# Patient Record
Sex: MALE | Race: BLACK OR AFRICAN AMERICAN | NOT HISPANIC OR LATINO | Employment: UNEMPLOYED | URBAN - METROPOLITAN AREA
[De-identification: names, ages, dates, MRNs, and addresses within clinical notes are randomized per-mention and may not be internally consistent; named-entity substitution may affect disease eponyms.]

---

## 2016-12-29 LAB — CRC RECOMMENDATION EXT: NORMAL

## 2017-03-29 ENCOUNTER — HISTORICAL (OUTPATIENT)
Dept: ENDOSCOPY | Facility: HOSPITAL | Age: 52
End: 2017-03-29

## 2020-08-31 ENCOUNTER — HISTORICAL (OUTPATIENT)
Dept: ADMINISTRATIVE | Facility: HOSPITAL | Age: 55
End: 2020-08-31

## 2020-08-31 LAB
BUN SERPL-MCNC: 14 MG/DL (ref 7–18)
CALCIUM SERPL-MCNC: 9.5 MG/DL (ref 8.5–10.1)
CHLORIDE SERPL-SCNC: 108 MMOL/L (ref 98–107)
CO2 SERPL-SCNC: 26 MMOL/L (ref 21–32)
CREAT SERPL-MCNC: 1.2 MG/DL (ref 0.6–1.3)
CREAT/UREA NIT SERPL: 12 MG/DL (ref 12–14)
GLUCOSE SERPL-MCNC: 90 MG/DL (ref 74–106)
POTASSIUM SERPL-SCNC: 4.7 MMOL/L (ref 3.5–5.1)
PSA SERPL-MCNC: 117 NG/ML
SODIUM SERPL-SCNC: 140 MMOL/L (ref 136–145)

## 2020-09-10 ENCOUNTER — HISTORICAL (OUTPATIENT)
Dept: RADIOLOGY | Facility: HOSPITAL | Age: 55
End: 2020-09-10

## 2020-09-14 ENCOUNTER — HISTORICAL (OUTPATIENT)
Dept: RADIATION THERAPY | Facility: HOSPITAL | Age: 55
End: 2020-09-14

## 2020-09-29 ENCOUNTER — HISTORICAL (OUTPATIENT)
Dept: ENDOSCOPY | Facility: HOSPITAL | Age: 55
End: 2020-09-29

## 2020-09-30 ENCOUNTER — HISTORICAL (OUTPATIENT)
Dept: RADIATION THERAPY | Facility: HOSPITAL | Age: 55
End: 2020-09-30

## 2020-10-14 ENCOUNTER — HISTORICAL (OUTPATIENT)
Dept: RADIATION THERAPY | Facility: HOSPITAL | Age: 55
End: 2020-10-14

## 2020-10-15 ENCOUNTER — HISTORICAL (OUTPATIENT)
Dept: RADIATION THERAPY | Facility: HOSPITAL | Age: 55
End: 2020-10-15

## 2020-10-19 ENCOUNTER — HISTORICAL (OUTPATIENT)
Dept: RADIATION THERAPY | Facility: HOSPITAL | Age: 55
End: 2020-10-19

## 2020-10-20 ENCOUNTER — HISTORICAL (OUTPATIENT)
Dept: RADIATION THERAPY | Facility: HOSPITAL | Age: 55
End: 2020-10-20

## 2020-10-21 ENCOUNTER — HISTORICAL (OUTPATIENT)
Dept: RADIATION THERAPY | Facility: HOSPITAL | Age: 55
End: 2020-10-21

## 2020-10-22 ENCOUNTER — HISTORICAL (OUTPATIENT)
Dept: RADIATION THERAPY | Facility: HOSPITAL | Age: 55
End: 2020-10-22

## 2020-10-23 ENCOUNTER — HISTORICAL (OUTPATIENT)
Dept: RADIATION THERAPY | Facility: HOSPITAL | Age: 55
End: 2020-10-23

## 2020-10-26 ENCOUNTER — HISTORICAL (OUTPATIENT)
Dept: RADIATION THERAPY | Facility: HOSPITAL | Age: 55
End: 2020-10-26

## 2020-10-27 ENCOUNTER — HISTORICAL (OUTPATIENT)
Dept: RADIATION THERAPY | Facility: HOSPITAL | Age: 55
End: 2020-10-27

## 2020-10-28 ENCOUNTER — HISTORICAL (OUTPATIENT)
Dept: RADIATION THERAPY | Facility: HOSPITAL | Age: 55
End: 2020-10-28

## 2020-10-29 ENCOUNTER — HISTORICAL (OUTPATIENT)
Dept: RADIATION THERAPY | Facility: HOSPITAL | Age: 55
End: 2020-10-29

## 2020-10-30 ENCOUNTER — HISTORICAL (OUTPATIENT)
Dept: RADIATION THERAPY | Facility: HOSPITAL | Age: 55
End: 2020-10-30

## 2020-11-02 ENCOUNTER — HISTORICAL (OUTPATIENT)
Dept: RADIATION THERAPY | Facility: HOSPITAL | Age: 55
End: 2020-11-02

## 2020-11-03 ENCOUNTER — HISTORICAL (OUTPATIENT)
Dept: RADIATION THERAPY | Facility: HOSPITAL | Age: 55
End: 2020-11-03

## 2020-11-04 ENCOUNTER — HISTORICAL (OUTPATIENT)
Dept: RADIATION THERAPY | Facility: HOSPITAL | Age: 55
End: 2020-11-04

## 2020-11-05 ENCOUNTER — HISTORICAL (OUTPATIENT)
Dept: RADIATION THERAPY | Facility: HOSPITAL | Age: 55
End: 2020-11-05

## 2020-11-06 ENCOUNTER — HISTORICAL (OUTPATIENT)
Dept: RADIATION THERAPY | Facility: HOSPITAL | Age: 55
End: 2020-11-06

## 2020-11-09 ENCOUNTER — HISTORICAL (OUTPATIENT)
Dept: RADIATION THERAPY | Facility: HOSPITAL | Age: 55
End: 2020-11-09

## 2020-11-10 ENCOUNTER — HISTORICAL (OUTPATIENT)
Dept: RADIATION THERAPY | Facility: HOSPITAL | Age: 55
End: 2020-11-10

## 2020-11-11 ENCOUNTER — HISTORICAL (OUTPATIENT)
Dept: RADIATION THERAPY | Facility: HOSPITAL | Age: 55
End: 2020-11-11

## 2020-11-12 ENCOUNTER — HISTORICAL (OUTPATIENT)
Dept: RADIATION THERAPY | Facility: HOSPITAL | Age: 55
End: 2020-11-12

## 2020-11-13 ENCOUNTER — HISTORICAL (OUTPATIENT)
Dept: RADIATION THERAPY | Facility: HOSPITAL | Age: 55
End: 2020-11-13

## 2020-11-16 ENCOUNTER — HISTORICAL (OUTPATIENT)
Dept: RADIATION THERAPY | Facility: HOSPITAL | Age: 55
End: 2020-11-16

## 2020-11-17 ENCOUNTER — HISTORICAL (OUTPATIENT)
Dept: RADIATION THERAPY | Facility: HOSPITAL | Age: 55
End: 2020-11-17

## 2020-11-18 ENCOUNTER — HISTORICAL (OUTPATIENT)
Dept: RADIATION THERAPY | Facility: HOSPITAL | Age: 55
End: 2020-11-18

## 2020-11-19 ENCOUNTER — HISTORICAL (OUTPATIENT)
Dept: RADIATION THERAPY | Facility: HOSPITAL | Age: 55
End: 2020-11-19

## 2020-11-20 ENCOUNTER — HISTORICAL (OUTPATIENT)
Dept: RADIATION THERAPY | Facility: HOSPITAL | Age: 55
End: 2020-11-20

## 2020-11-23 ENCOUNTER — HISTORICAL (OUTPATIENT)
Dept: RADIATION THERAPY | Facility: HOSPITAL | Age: 55
End: 2020-11-23

## 2020-11-24 ENCOUNTER — HISTORICAL (OUTPATIENT)
Dept: RADIATION THERAPY | Facility: HOSPITAL | Age: 55
End: 2020-11-24

## 2020-11-25 ENCOUNTER — HISTORICAL (OUTPATIENT)
Dept: RADIATION THERAPY | Facility: HOSPITAL | Age: 55
End: 2020-11-25

## 2020-11-30 ENCOUNTER — HISTORICAL (OUTPATIENT)
Dept: RADIATION THERAPY | Facility: HOSPITAL | Age: 55
End: 2020-11-30

## 2020-12-01 ENCOUNTER — HISTORICAL (OUTPATIENT)
Dept: RADIATION THERAPY | Facility: HOSPITAL | Age: 55
End: 2020-12-01

## 2021-01-07 ENCOUNTER — HISTORICAL (OUTPATIENT)
Dept: UROLOGY | Facility: CLINIC | Age: 56
End: 2021-01-07

## 2021-01-07 LAB — PSA SERPL-MCNC: 5.09 NG/ML

## 2021-03-31 ENCOUNTER — HISTORICAL (OUTPATIENT)
Dept: RADIATION THERAPY | Facility: HOSPITAL | Age: 56
End: 2021-03-31

## 2021-04-12 ENCOUNTER — HISTORICAL (OUTPATIENT)
Dept: WOUND CARE | Facility: HOSPITAL | Age: 56
End: 2021-04-12

## 2021-04-12 LAB — PSA SERPL-MCNC: 4.03 NG/ML

## 2021-09-01 ENCOUNTER — HISTORICAL (OUTPATIENT)
Dept: RADIOLOGY | Facility: HOSPITAL | Age: 56
End: 2021-09-01

## 2021-09-02 ENCOUNTER — HISTORICAL (OUTPATIENT)
Dept: ADMINISTRATIVE | Facility: HOSPITAL | Age: 56
End: 2021-09-02

## 2021-09-02 LAB
ABS NEUT (OLG): 4.78 X10(3)/MCL (ref 2.1–9.2)
ALBUMIN SERPL-MCNC: 3.7 GM/DL (ref 3.5–5)
ALBUMIN/GLOB SERPL: 0.9 RATIO (ref 1.1–2)
ALP SERPL-CCNC: 106 UNIT/L (ref 40–150)
ALT SERPL-CCNC: 22 UNIT/L (ref 0–55)
AST SERPL-CCNC: 18 UNIT/L (ref 5–34)
BASOPHILS # BLD AUTO: 0 X10(3)/MCL (ref 0–0.2)
BASOPHILS NFR BLD AUTO: 0 %
BILIRUB SERPL-MCNC: 0.8 MG/DL
BILIRUBIN DIRECT+TOT PNL SERPL-MCNC: 0.3 MG/DL (ref 0–0.5)
BILIRUBIN DIRECT+TOT PNL SERPL-MCNC: 0.5 MG/DL (ref 0–0.8)
BUN SERPL-MCNC: 11.9 MG/DL (ref 8.4–25.7)
CALCIUM SERPL-MCNC: 9.6 MG/DL (ref 8.4–10.2)
CHLORIDE SERPL-SCNC: 104 MMOL/L (ref 98–107)
CO2 SERPL-SCNC: 27 MMOL/L (ref 22–29)
CREAT SERPL-MCNC: 1.05 MG/DL (ref 0.73–1.18)
EOSINOPHIL # BLD AUTO: 0.2 X10(3)/MCL (ref 0–0.9)
EOSINOPHIL NFR BLD AUTO: 2 %
ERYTHROCYTE [DISTWIDTH] IN BLOOD BY AUTOMATED COUNT: 12.8 % (ref 11.5–14.5)
GLOBULIN SER-MCNC: 4.1 GM/DL (ref 2.4–3.5)
GLUCOSE SERPL-MCNC: 87 MG/DL (ref 74–100)
HCT VFR BLD AUTO: 35.7 % (ref 40–51)
HGB BLD-MCNC: 12 GM/DL (ref 13.5–17.5)
IMM GRANULOCYTES # BLD AUTO: 0.03 10*3/UL
IMM GRANULOCYTES NFR BLD AUTO: 0 %
LYMPHOCYTES # BLD AUTO: 1.9 X10(3)/MCL (ref 0.6–4.6)
LYMPHOCYTES NFR BLD AUTO: 26 %
MCH RBC QN AUTO: 28.2 PG (ref 26–34)
MCHC RBC AUTO-ENTMCNC: 33.6 GM/DL (ref 31–37)
MCV RBC AUTO: 83.8 FL (ref 80–100)
MONOCYTES # BLD AUTO: 0.5 X10(3)/MCL (ref 0.1–1.3)
MONOCYTES NFR BLD AUTO: 7 %
NEUTROPHILS # BLD AUTO: 4.78 X10(3)/MCL (ref 2.1–9.2)
NEUTROPHILS NFR BLD AUTO: 64 %
NRBC BLD AUTO-RTO: 0 % (ref 0–0.2)
PLATELET # BLD AUTO: 149 X10(3)/MCL (ref 130–400)
PMV BLD AUTO: 11.2 FL (ref 7.4–10.4)
POTASSIUM SERPL-SCNC: 4.2 MMOL/L (ref 3.5–5.1)
PROT SERPL-MCNC: 7.8 GM/DL (ref 6.4–8.3)
PSA SERPL-MCNC: 2.55 NG/ML
RBC # BLD AUTO: 4.26 X10(6)/MCL (ref 4.5–5.9)
SODIUM SERPL-SCNC: 138 MMOL/L (ref 136–145)
WBC # SPEC AUTO: 7.4 X10(3)/MCL (ref 4.5–11)

## 2021-09-29 ENCOUNTER — HISTORICAL (OUTPATIENT)
Dept: RADIATION THERAPY | Facility: HOSPITAL | Age: 56
End: 2021-09-29

## 2021-10-14 ENCOUNTER — HISTORICAL (OUTPATIENT)
Dept: ADMINISTRATIVE | Facility: HOSPITAL | Age: 56
End: 2021-10-14

## 2021-10-14 LAB
ABS NEUT (OLG): 3.72 X10(3)/MCL (ref 2.1–9.2)
ALBUMIN SERPL-MCNC: 3.3 GM/DL (ref 3.5–5)
ALBUMIN/GLOB SERPL: 0.9 RATIO (ref 1.1–2)
ALP SERPL-CCNC: 121 UNIT/L (ref 40–150)
ALT SERPL-CCNC: 32 UNIT/L (ref 0–55)
AST SERPL-CCNC: 15 UNIT/L (ref 5–34)
BASOPHILS # BLD AUTO: 0.06 X10(3)/MCL (ref 0–0.2)
BASOPHILS NFR BLD AUTO: 1 % (ref 0–0.9)
BILIRUB SERPL-MCNC: 0.4 MG/DL (ref 0.2–1.2)
BILIRUBIN DIRECT+TOT PNL SERPL-MCNC: 0.2 MG/DL (ref 0–0.5)
BILIRUBIN DIRECT+TOT PNL SERPL-MCNC: 0.2 MG/DL (ref 0–0.8)
BUN SERPL-MCNC: 16.1 MG/DL (ref 8.4–25.7)
CALCIUM SERPL-MCNC: 10 MG/DL (ref 8.4–10.2)
CHLORIDE SERPL-SCNC: 102 MMOL/L (ref 98–107)
CO2 SERPL-SCNC: 25 MMOL/L (ref 22–29)
CREAT SERPL-MCNC: 1.12 MG/DL (ref 0.72–1.25)
CRP SERPL HS-MCNC: 13 MG/L (ref 0–5)
EOSINOPHIL # BLD AUTO: 0.23 X10(3)/MCL (ref 0–0.9)
EOSINOPHIL NFR BLD AUTO: 3.7 % (ref 0–6.5)
ERYTHROCYTE [DISTWIDTH] IN BLOOD BY AUTOMATED COUNT: 13.8 % (ref 11.5–17)
ERYTHROCYTE [SEDIMENTATION RATE] IN BLOOD: 96 MM/HR (ref 0–20)
GLOBULIN SER-MCNC: 3.8 GM/DL (ref 2.4–3.5)
GLUCOSE SERPL-MCNC: 150 MG/DL (ref 74–100)
HCT VFR BLD AUTO: 30.7 % (ref 42–52)
HGB BLD-MCNC: 10 GM/DL (ref 14–18)
IMM GRANULOCYTES # BLD AUTO: 0.04 10*3/UL (ref 0–0.02)
IMM GRANULOCYTES NFR BLD AUTO: 0.6 % (ref 0–0.43)
LYMPHOCYTES # BLD AUTO: 1.9 X10(3)/MCL (ref 0.6–4.6)
LYMPHOCYTES NFR BLD AUTO: 30.2 % (ref 16.2–38.3)
MCH RBC QN AUTO: 26.3 PG (ref 27–31)
MCHC RBC AUTO-ENTMCNC: 32.6 GM/DL (ref 33–36)
MCV RBC AUTO: 80.8 FL (ref 80–94)
MONOCYTES # BLD AUTO: 0.35 X10(3)/MCL (ref 0.1–1.3)
MONOCYTES NFR BLD AUTO: 5.6 % (ref 4.7–11.3)
NEUTROPHILS # BLD AUTO: 3.72 X10(3)/MCL (ref 2.1–9.2)
NEUTROPHILS NFR BLD AUTO: 58.9 % (ref 49.1–73.4)
NRBC BLD AUTO-RTO: 0 % (ref 0–0.2)
PLATELET # BLD AUTO: 256 X10(3)/MCL (ref 130–400)
PMV BLD AUTO: 9.5 FL (ref 7.4–10.4)
POTASSIUM SERPL-SCNC: 4.7 MMOL/L (ref 3.5–5.1)
PREALB SERPL-MCNC: 22.6 MG/DL (ref 18–45)
PROT SERPL-MCNC: 7.1 GM/DL (ref 6.4–8.3)
RBC # BLD AUTO: 3.8 X10(6)/MCL (ref 4.7–6.1)
SODIUM SERPL-SCNC: 138 MMOL/L (ref 136–145)
WBC # SPEC AUTO: 6.3 X10(3)/MCL (ref 4.5–11.5)

## 2021-10-15 LAB — GENTAMICIN SERPL-MCNC: 3.9 UG/ML

## 2021-10-17 LAB — GENTAMICIN SERPL-MCNC: 3.4 UG/ML

## 2021-10-19 LAB — GENTAMICIN SERPL-MCNC: 3.9 UG/ML

## 2021-10-21 LAB
ABS NEUT (OLG): 3.86 X10(3)/MCL (ref 2.1–9.2)
ALBUMIN SERPL-MCNC: 3.1 GM/DL (ref 3.5–5)
BUN SERPL-MCNC: 21.4 MG/DL (ref 8.4–25.7)
CALCIUM SERPL-MCNC: 10 MG/DL (ref 8.4–10.2)
CHLORIDE SERPL-SCNC: 105 MMOL/L (ref 98–107)
CO2 SERPL-SCNC: 24 MMOL/L (ref 22–29)
CREAT SERPL-MCNC: 1.26 MG/DL (ref 0.72–1.25)
ERYTHROCYTE [DISTWIDTH] IN BLOOD BY AUTOMATED COUNT: 14.4 % (ref 11.5–17)
EST. AVERAGE GLUCOSE BLD GHB EST-MCNC: 125.5 MG/DL
GENTAMICIN SERPL-MCNC: 2.5 UG/ML
GLUCOSE SERPL-MCNC: 157 MG/DL (ref 74–100)
HBA1C MFR BLD: 6 %
HCT VFR BLD AUTO: 26.7 % (ref 42–52)
HGB BLD-MCNC: 8.8 GM/DL (ref 14–18)
MAGNESIUM SERPL-MCNC: 2 MG/DL (ref 1.6–2.6)
MCH RBC QN AUTO: 26.2 PG (ref 27–31)
MCHC RBC AUTO-ENTMCNC: 33 GM/DL (ref 33–36)
MCV RBC AUTO: 79.5 FL (ref 80–94)
NRBC BLD AUTO-RTO: 0 % (ref 0–0.2)
PHOSPHATE SERPL-MCNC: 4.7 MG/DL (ref 2.3–4.7)
PLATELET # BLD AUTO: 198 X10(3)/MCL (ref 130–400)
PMV BLD AUTO: 10.1 FL (ref 7.4–10.4)
POTASSIUM SERPL-SCNC: 4.3 MMOL/L (ref 3.5–5.1)
RBC # BLD AUTO: 3.36 X10(6)/MCL (ref 4.7–6.1)
SODIUM SERPL-SCNC: 139 MMOL/L (ref 136–145)
WBC # SPEC AUTO: 6.8 X10(3)/MCL (ref 4.5–11.5)

## 2021-10-23 LAB
BUN SERPL-MCNC: 25.9 MG/DL (ref 8.4–25.7)
CALCIUM SERPL-MCNC: 9.9 MG/DL (ref 8.4–10.2)
CHLORIDE SERPL-SCNC: 104 MMOL/L (ref 98–107)
CO2 SERPL-SCNC: 24 MMOL/L (ref 22–29)
CREAT SERPL-MCNC: 1.02 MG/DL (ref 0.72–1.25)
CREAT/UREA NIT SERPL: 25
GENTAMICIN SERPL-MCNC: 2 UG/ML
GLUCOSE SERPL-MCNC: 154 MG/DL (ref 74–100)
POTASSIUM SERPL-SCNC: 4.8 MMOL/L (ref 3.5–5.1)
SODIUM SERPL-SCNC: 138 MMOL/L (ref 136–145)

## 2021-10-25 LAB — GENTAMICIN SERPL-MCNC: 2 UG/ML

## 2021-12-07 ENCOUNTER — HISTORICAL (OUTPATIENT)
Dept: CARDIOLOGY | Facility: HOSPITAL | Age: 56
End: 2021-12-07

## 2021-12-07 LAB
ABS NEUT (OLG): 2.39 X10(3)/MCL (ref 2.1–9.2)
APTT PPP: 31.7 SECOND(S) (ref 23.3–37)
BASOPHILS # BLD AUTO: 0 X10(3)/MCL (ref 0–0.2)
BASOPHILS NFR BLD AUTO: 1 %
BUN SERPL-MCNC: 8.9 MG/DL (ref 8.4–25.7)
CALCIUM SERPL-MCNC: 9.6 MG/DL (ref 8.7–10.5)
CHLORIDE SERPL-SCNC: 109 MMOL/L (ref 98–107)
CO2 SERPL-SCNC: 25 MMOL/L (ref 22–29)
CREAT SERPL-MCNC: 1.15 MG/DL (ref 0.73–1.18)
CREAT/UREA NIT SERPL: 8
EOSINOPHIL # BLD AUTO: 0.4 X10(3)/MCL (ref 0–0.9)
EOSINOPHIL NFR BLD AUTO: 10 %
ERYTHROCYTE [DISTWIDTH] IN BLOOD BY AUTOMATED COUNT: 14.9 % (ref 11.5–14.5)
GLUCOSE SERPL-MCNC: 120 MG/DL (ref 74–100)
HCT VFR BLD AUTO: 33.1 % (ref 40–51)
HGB BLD-MCNC: 10.4 GM/DL (ref 13.5–17.5)
IMM GRANULOCYTES # BLD AUTO: 0.01 10*3/UL
IMM GRANULOCYTES NFR BLD AUTO: 0 %
INR PPP: 1.01 (ref 0.9–1.2)
LYMPHOCYTES # BLD AUTO: 1.5 X10(3)/MCL (ref 0.6–4.6)
LYMPHOCYTES NFR BLD AUTO: 31 %
MCH RBC QN AUTO: 24.2 PG (ref 26–34)
MCHC RBC AUTO-ENTMCNC: 31.4 GM/DL (ref 31–37)
MCV RBC AUTO: 77 FL (ref 80–100)
MONOCYTES # BLD AUTO: 0.4 X10(3)/MCL (ref 0.1–1.3)
MONOCYTES NFR BLD AUTO: 8 %
NEUTROPHILS # BLD AUTO: 2.39 X10(3)/MCL (ref 2.1–9.2)
NEUTROPHILS NFR BLD AUTO: 50 %
NRBC BLD AUTO-RTO: 0 % (ref 0–0.2)
PLATELET # BLD AUTO: 190 X10(3)/MCL (ref 130–400)
PMV BLD AUTO: 10.2 FL (ref 7.4–10.4)
POTASSIUM SERPL-SCNC: 4.2 MMOL/L (ref 3.5–5.1)
PROTHROMBIN TIME: 13.1 SECOND(S) (ref 11.9–14.4)
RBC # BLD AUTO: 4.3 X10(6)/MCL (ref 4.5–5.9)
SODIUM SERPL-SCNC: 139 MMOL/L (ref 136–145)
WBC # SPEC AUTO: 4.8 X10(3)/MCL (ref 4.5–11)

## 2021-12-08 ENCOUNTER — HISTORICAL (OUTPATIENT)
Dept: CARDIOLOGY | Facility: HOSPITAL | Age: 56
End: 2021-12-08

## 2021-12-08 LAB — SARS-COV-2 AG RESP QL IA.RAPID: NEGATIVE

## 2021-12-23 ENCOUNTER — HISTORICAL (OUTPATIENT)
Dept: ADMINISTRATIVE | Facility: HOSPITAL | Age: 56
End: 2021-12-23

## 2021-12-23 LAB
ABS NEUT (OLG): 2.12 X10(3)/MCL (ref 2.1–9.2)
ALBUMIN SERPL-MCNC: 4 GM/DL (ref 3.5–5)
ALBUMIN/GLOB SERPL: 1.2 RATIO (ref 1.1–2)
ALP SERPL-CCNC: 132 UNIT/L (ref 40–150)
ALT SERPL-CCNC: 13 UNIT/L (ref 0–55)
AST SERPL-CCNC: 17 UNIT/L (ref 5–34)
BASOPHILS # BLD AUTO: 0 X10(3)/MCL (ref 0–0.2)
BASOPHILS NFR BLD AUTO: 0 %
BILIRUB SERPL-MCNC: 0.7 MG/DL
BILIRUBIN DIRECT+TOT PNL SERPL-MCNC: 0.3 MG/DL (ref 0–0.5)
BILIRUBIN DIRECT+TOT PNL SERPL-MCNC: 0.4 MG/DL (ref 0–0.8)
BUN SERPL-MCNC: 10.3 MG/DL (ref 8.4–25.7)
CALCIUM SERPL-MCNC: 9.7 MG/DL (ref 8.7–10.5)
CHLORIDE SERPL-SCNC: 111 MMOL/L (ref 98–107)
CO2 SERPL-SCNC: 26 MMOL/L (ref 22–29)
CREAT SERPL-MCNC: 1.1 MG/DL (ref 0.73–1.18)
EOSINOPHIL # BLD AUTO: 0.2 X10(3)/MCL (ref 0–0.9)
EOSINOPHIL NFR BLD AUTO: 5 %
ERYTHROCYTE [DISTWIDTH] IN BLOOD BY AUTOMATED COUNT: 15 % (ref 11.5–14.5)
GLOBULIN SER-MCNC: 3.2 GM/DL (ref 2.4–3.5)
GLUCOSE SERPL-MCNC: 104 MG/DL (ref 74–100)
HCT VFR BLD AUTO: 33.4 % (ref 40–51)
HGB BLD-MCNC: 10.5 GM/DL (ref 13.5–17.5)
LYMPHOCYTES # BLD AUTO: 2 X10(3)/MCL (ref 0.6–4.6)
LYMPHOCYTES NFR BLD AUTO: 42 %
MCH RBC QN AUTO: 23.5 PG (ref 26–34)
MCHC RBC AUTO-ENTMCNC: 31.4 GM/DL (ref 31–37)
MCV RBC AUTO: 74.9 FL (ref 80–100)
MONOCYTES # BLD AUTO: 0.4 X10(3)/MCL (ref 0.1–1.3)
MONOCYTES NFR BLD AUTO: 8 %
NEUTROPHILS # BLD AUTO: 2.12 X10(3)/MCL (ref 2.1–9.2)
NEUTROPHILS NFR BLD AUTO: 45 %
NRBC BLD AUTO-RTO: 0 % (ref 0–0.2)
PLATELET # BLD AUTO: 186 X10(3)/MCL (ref 130–400)
PMV BLD AUTO: 10 FL (ref 7.4–10.4)
POTASSIUM SERPL-SCNC: 4.5 MMOL/L (ref 3.5–5.1)
PROT SERPL-MCNC: 7.2 GM/DL (ref 6.4–8.3)
PSA SERPL-MCNC: 7.21 NG/ML
RBC # BLD AUTO: 4.46 X10(6)/MCL (ref 4.5–5.9)
SODIUM SERPL-SCNC: 142 MMOL/L (ref 136–145)
WBC # SPEC AUTO: 4.7 X10(3)/MCL (ref 4.5–11)

## 2022-01-04 ENCOUNTER — HISTORICAL (OUTPATIENT)
Dept: RADIOLOGY | Facility: HOSPITAL | Age: 57
End: 2022-01-04

## 2022-01-07 ENCOUNTER — HISTORICAL (OUTPATIENT)
Dept: ADMINISTRATIVE | Facility: HOSPITAL | Age: 57
End: 2022-01-07

## 2022-01-07 LAB
ABS NEUT (OLG): 1.98 X10(3)/MCL (ref 2.1–9.2)
ALBUMIN SERPL-MCNC: 3.8 GM/DL (ref 3.5–5)
ALBUMIN/GLOB SERPL: 1.1 RATIO (ref 1.1–2)
ALP SERPL-CCNC: 118 UNIT/L (ref 40–150)
ALT SERPL-CCNC: 13 UNIT/L (ref 0–55)
AST SERPL-CCNC: 17 UNIT/L (ref 5–34)
BASOPHILS # BLD AUTO: 0 X10(3)/MCL (ref 0–0.2)
BASOPHILS NFR BLD AUTO: 1 %
BILIRUB SERPL-MCNC: 0.5 MG/DL
BILIRUBIN DIRECT+TOT PNL SERPL-MCNC: 0.2 MG/DL (ref 0–0.5)
BILIRUBIN DIRECT+TOT PNL SERPL-MCNC: 0.3 MG/DL (ref 0–0.8)
BUN SERPL-MCNC: 11.7 MG/DL (ref 8.4–25.7)
CALCIUM SERPL-MCNC: 9.6 MG/DL (ref 8.7–10.5)
CHLORIDE SERPL-SCNC: 112 MMOL/L (ref 98–107)
CO2 SERPL-SCNC: 24 MMOL/L (ref 22–29)
CREAT SERPL-MCNC: 1.51 MG/DL (ref 0.73–1.18)
EOSINOPHIL # BLD AUTO: 0.2 X10(3)/MCL (ref 0–0.9)
EOSINOPHIL NFR BLD AUTO: 4 %
ERYTHROCYTE [DISTWIDTH] IN BLOOD BY AUTOMATED COUNT: 15.1 % (ref 11.5–14.5)
GLOBULIN SER-MCNC: 3.4 GM/DL (ref 2.4–3.5)
GLUCOSE SERPL-MCNC: 101 MG/DL (ref 74–100)
HCT VFR BLD AUTO: 34.1 % (ref 40–51)
HGB BLD-MCNC: 10.4 GM/DL (ref 13.5–17.5)
IMM GRANULOCYTES # BLD AUTO: 0.01 10*3/UL
IMM GRANULOCYTES NFR BLD AUTO: 0 %
LYMPHOCYTES # BLD AUTO: 1.8 X10(3)/MCL (ref 0.6–4.6)
LYMPHOCYTES NFR BLD AUTO: 42 %
MCH RBC QN AUTO: 22.5 PG (ref 26–34)
MCHC RBC AUTO-ENTMCNC: 30.5 GM/DL (ref 31–37)
MCV RBC AUTO: 73.8 FL (ref 80–100)
MONOCYTES # BLD AUTO: 0.3 X10(3)/MCL (ref 0.1–1.3)
MONOCYTES NFR BLD AUTO: 8 %
NEUTROPHILS # BLD AUTO: 1.98 X10(3)/MCL (ref 2.1–9.2)
NEUTROPHILS NFR BLD AUTO: 45 %
NRBC BLD AUTO-RTO: 0 % (ref 0–0.2)
PLATELET # BLD AUTO: 205 X10(3)/MCL (ref 130–400)
PMV BLD AUTO: 10.5 FL (ref 7.4–10.4)
POTASSIUM SERPL-SCNC: 4.1 MMOL/L (ref 3.5–5.1)
PROT SERPL-MCNC: 7.2 GM/DL (ref 6.4–8.3)
PSA SERPL-MCNC: 5.97 NG/ML
RBC # BLD AUTO: 4.62 X10(6)/MCL (ref 4.5–5.9)
SODIUM SERPL-SCNC: 142 MMOL/L (ref 136–145)
TESTOST SERPL-MCNC: 200.66 NG/DL (ref 220.91–715.81)
WBC # SPEC AUTO: 4.4 X10(3)/MCL (ref 4.5–11)

## 2022-01-12 ENCOUNTER — HISTORICAL (OUTPATIENT)
Dept: RADIOLOGY | Facility: HOSPITAL | Age: 57
End: 2022-01-12

## 2022-01-31 ENCOUNTER — HISTORICAL (OUTPATIENT)
Dept: ADMINISTRATIVE | Facility: HOSPITAL | Age: 57
End: 2022-01-31

## 2022-01-31 LAB
ABS NEUT (OLG): 1.7 (ref 2.1–9.2)
ALBUMIN SERPL-MCNC: 3.9 G/DL (ref 3.5–5)
ALBUMIN/GLOB SERPL: 1 {RATIO} (ref 1.1–2)
ALP SERPL-CCNC: 126 U/L (ref 40–150)
ALT SERPL-CCNC: 14 U/L (ref 0–55)
AST SERPL-CCNC: 17 U/L (ref 5–34)
BASOPHILS # BLD AUTO: 0 10*3/UL (ref 0–0.2)
BASOPHILS NFR BLD AUTO: 1 %
BILIRUB SERPL-MCNC: 0.5 MG/DL
BILIRUBIN DIRECT+TOT PNL SERPL-MCNC: 0.2 (ref 0–0.5)
BILIRUBIN DIRECT+TOT PNL SERPL-MCNC: 0.3 (ref 0–0.8)
BUN SERPL-MCNC: 10.9 MG/DL (ref 8.4–25.7)
CALCIUM SERPL-MCNC: 9.7 MG/DL (ref 8.7–10.5)
CHLORIDE SERPL-SCNC: 110 MMOL/L (ref 98–107)
CO2 SERPL-SCNC: 24 MMOL/L (ref 22–29)
CREAT SERPL-MCNC: 1.08 MG/DL (ref 0.73–1.18)
EOSINOPHIL # BLD AUTO: 0.2 10*3/UL (ref 0–0.9)
EOSINOPHIL NFR BLD AUTO: 5 %
ERYTHROCYTE [DISTWIDTH] IN BLOOD BY AUTOMATED COUNT: 15.4 % (ref 11.5–14.5)
FLAG2 (OHS): 50
FLAG3 (OHS): 80
FLAGS (OHS): 90
GLOBULIN SER-MCNC: 3.8 G/DL (ref 2.4–3.5)
GLUCOSE SERPL-MCNC: 123 MG/DL (ref 74–100)
HCT VFR BLD AUTO: 35.2 % (ref 40–51)
HEMOLYSIS INTERF INDEX SERPL-ACNC: 1
HGB BLD-MCNC: 10.7 G/DL (ref 13.5–17.5)
ICTERIC INTERF INDEX SERPL-ACNC: 0
IMM GRANULOCYTES # BLD AUTO: 0.01 10*3/UL
IMM GRANULOCYTES NFR BLD AUTO: 0 %
LIPEMIC INTERF INDEX SERPL-ACNC: 5
LOW EVENT # SUSPECT FLAG (OHS): 100
LYMPHOCYTES # BLD AUTO: 1.8 10*3/UL (ref 0.6–4.6)
LYMPHOCYTES NFR BLD AUTO: 45 %
MANUAL DIFF? (OHS): NO
MCH RBC QN AUTO: 21.7 PG (ref 26–34)
MCHC RBC AUTO-ENTMCNC: 30.4 G/DL (ref 31–37)
MCV RBC AUTO: 71.5 FL (ref 80–100)
MO BLASTS SUSPECT FLAG (OHS): 60
MONOCYTES # BLD AUTO: 0.3 10*3/UL (ref 0.1–1.3)
MONOCYTES NFR BLD AUTO: 7 %
NEUTROPHILS # BLD AUTO: 1.7 10*3/UL (ref 2.1–9.2)
NEUTROPHILS NFR BLD AUTO: 42 %
NRBC BLD AUTO-RTO: 0 % (ref 0–0.2)
PLATELET # BLD AUTO: 181 10*3/UL (ref 130–400)
PMV BLD AUTO: 10.2 FL (ref 7.4–10.4)
POTASSIUM SERPL-SCNC: 4.2 MMOL/L (ref 3.5–5.1)
PROT SERPL-MCNC: 7.7 G/DL (ref 6.4–8.3)
RBC # BLD AUTO: 4.92 10*6/UL (ref 4.5–5.9)
SODIUM SERPL-SCNC: 143 MMOL/L (ref 136–145)
WBC # SPEC AUTO: 4.1 10*3/UL (ref 4.5–11)
ZZGIANT PLATELETS (OHS): 20

## 2022-03-15 ENCOUNTER — HISTORICAL (OUTPATIENT)
Dept: ADMINISTRATIVE | Facility: HOSPITAL | Age: 57
End: 2022-03-15

## 2022-03-29 ENCOUNTER — HISTORICAL (OUTPATIENT)
Dept: RADIATION THERAPY | Facility: HOSPITAL | Age: 57
End: 2022-03-29

## 2022-04-11 ENCOUNTER — HISTORICAL (OUTPATIENT)
Dept: ADMINISTRATIVE | Facility: HOSPITAL | Age: 57
End: 2022-04-11
Payer: MEDICAID

## 2022-04-29 VITALS
SYSTOLIC BLOOD PRESSURE: 94 MMHG | BODY MASS INDEX: 29.3 KG/M2 | WEIGHT: 193.31 LBS | HEIGHT: 68 IN | DIASTOLIC BLOOD PRESSURE: 56 MMHG | OXYGEN SATURATION: 100 %

## 2022-04-30 NOTE — OP NOTE
DATE OF SURGERY:    09/29/2020    SURGEON:  Tico Ramirez MD    attending physician:  Tico Ramirez MD    PREOPERATIVE DIAGNOSIS:  Elevated prostate-specific antigen.    POSTOPERATIVE DIAGNOSIS:  Elevated prostate-specific antigen.    PROCEDURE:    1. Prostate ultrasound.  2. Ultrasound-guided biopsy.  3. Prostate biopsy.    ANESTHESIA:  General.    COMPLICATIONS:  None.    BLOOD LOSS:  Minimal.    FINDINGS:  Unremarkable prostatic architecture.  Tolerated procedure well.    INDICATIONS:  A 54-year-old male with PSA of 117, comes in for prostate biopsy.  Risks and benefits discussed, including bleeding, infection, failure to diagnose, among others.    DESCRIPTION OF PROCEDURE:  He was taken to the operative suite, underwent general anesthesia, placed with the right flank up.  8-megahertz ultrasound probe was inserted into his rectal vault.  Prostate was unremarkable.  Sagittal section was carried out.  He underwent biopsies in sextant fashion, two in each     base, mid, and apex.  He tolerated procedure well.  He was given a postbiopsy instruction sheet for items to call for, along with a prescription for fluoroquinolones.      ______________________________  Tico Ramirez MD    TAB/MODL  DD:  09/29/2020  Time:  10:36AM  DT:  09/29/2020  Time:  10:42AM  Job #:  [id number]

## 2022-05-16 ENCOUNTER — OFFICE VISIT (OUTPATIENT)
Dept: HEMATOLOGY/ONCOLOGY | Facility: CLINIC | Age: 57
End: 2022-05-16
Payer: MEDICAID

## 2022-05-16 VITALS
SYSTOLIC BLOOD PRESSURE: 144 MMHG | HEIGHT: 68 IN | TEMPERATURE: 99 F | DIASTOLIC BLOOD PRESSURE: 88 MMHG | RESPIRATION RATE: 20 BRPM | BODY MASS INDEX: 31.22 KG/M2 | WEIGHT: 206 LBS | HEART RATE: 97 BPM | OXYGEN SATURATION: 100 %

## 2022-05-16 DIAGNOSIS — Z72.0 TOBACCO ABUSE: Primary | ICD-10-CM

## 2022-05-16 DIAGNOSIS — C61 PROSTATE CANCER: ICD-10-CM

## 2022-05-16 PROCEDURE — 3077F PR MOST RECENT SYSTOLIC BLOOD PRESSURE >= 140 MM HG: ICD-10-PCS | Mod: CPTII,,, | Performed by: INTERNAL MEDICINE

## 2022-05-16 PROCEDURE — 3008F PR BODY MASS INDEX (BMI) DOCUMENTED: ICD-10-PCS | Mod: CPTII,,, | Performed by: INTERNAL MEDICINE

## 2022-05-16 PROCEDURE — 3079F DIAST BP 80-89 MM HG: CPT | Mod: CPTII,,, | Performed by: INTERNAL MEDICINE

## 2022-05-16 PROCEDURE — 84403 ASSAY OF TOTAL TESTOSTERONE: CPT | Performed by: INTERNAL MEDICINE

## 2022-05-16 PROCEDURE — 1160F RVW MEDS BY RX/DR IN RCRD: CPT | Mod: CPTII,,, | Performed by: INTERNAL MEDICINE

## 2022-05-16 PROCEDURE — 84153 ASSAY OF PSA TOTAL: CPT | Performed by: INTERNAL MEDICINE

## 2022-05-16 PROCEDURE — 3008F BODY MASS INDEX DOCD: CPT | Mod: CPTII,,, | Performed by: INTERNAL MEDICINE

## 2022-05-16 PROCEDURE — 1160F PR REVIEW ALL MEDS BY PRESCRIBER/CLIN PHARMACIST DOCUMENTED: ICD-10-PCS | Mod: CPTII,,, | Performed by: INTERNAL MEDICINE

## 2022-05-16 PROCEDURE — 1159F MED LIST DOCD IN RCRD: CPT | Mod: CPTII,,, | Performed by: INTERNAL MEDICINE

## 2022-05-16 PROCEDURE — 80053 COMPREHEN METABOLIC PANEL: CPT | Performed by: INTERNAL MEDICINE

## 2022-05-16 PROCEDURE — 84154 ASSAY OF PSA FREE: CPT | Performed by: INTERNAL MEDICINE

## 2022-05-16 PROCEDURE — 99214 OFFICE O/P EST MOD 30 MIN: CPT | Mod: S$PBB,,, | Performed by: INTERNAL MEDICINE

## 2022-05-16 PROCEDURE — 99214 OFFICE O/P EST MOD 30 MIN: CPT | Mod: PBBFAC | Performed by: INTERNAL MEDICINE

## 2022-05-16 PROCEDURE — 85025 COMPLETE CBC W/AUTO DIFF WBC: CPT | Performed by: INTERNAL MEDICINE

## 2022-05-16 PROCEDURE — 3077F SYST BP >= 140 MM HG: CPT | Mod: CPTII,,, | Performed by: INTERNAL MEDICINE

## 2022-05-16 PROCEDURE — 1159F PR MEDICATION LIST DOCUMENTED IN MEDICAL RECORD: ICD-10-PCS | Mod: CPTII,,, | Performed by: INTERNAL MEDICINE

## 2022-05-16 PROCEDURE — 3079F PR MOST RECENT DIASTOLIC BLOOD PRESSURE 80-89 MM HG: ICD-10-PCS | Mod: CPTII,,, | Performed by: INTERNAL MEDICINE

## 2022-05-16 PROCEDURE — 99214 PR OFFICE/OUTPT VISIT, EST, LEVL IV, 30-39 MIN: ICD-10-PCS | Mod: S$PBB,,, | Performed by: INTERNAL MEDICINE

## 2022-05-16 RX ORDER — TAMSULOSIN HYDROCHLORIDE 0.4 MG/1
CAPSULE ORAL
COMMUNITY
Start: 2022-01-26 | End: 2023-03-06

## 2022-05-16 RX ORDER — ATORVASTATIN CALCIUM 40 MG/1
40 TABLET, FILM COATED ORAL
COMMUNITY
Start: 2022-01-27 | End: 2022-06-07 | Stop reason: SDUPTHER

## 2022-05-16 RX ORDER — ERGOCALCIFEROL 1.25 MG/1
50000 CAPSULE ORAL
COMMUNITY
Start: 2021-10-25

## 2022-05-16 RX ORDER — NAPROXEN SODIUM 220 MG/1
81 TABLET, FILM COATED ORAL
COMMUNITY
Start: 2022-01-27 | End: 2022-06-07 | Stop reason: SDUPTHER

## 2022-05-16 RX ORDER — CLOPIDOGREL BISULFATE 75 MG/1
TABLET ORAL
COMMUNITY
Start: 2022-05-03 | End: 2022-06-07 | Stop reason: SDUPTHER

## 2022-05-16 RX ORDER — LEVOFLOXACIN 500 MG/1
500 TABLET, FILM COATED ORAL DAILY
COMMUNITY
Start: 2022-04-25 | End: 2022-10-09

## 2022-05-16 RX ORDER — METOPROLOL SUCCINATE 25 MG/1
25 TABLET, EXTENDED RELEASE ORAL
COMMUNITY
Start: 2022-01-27 | End: 2022-06-07 | Stop reason: SDUPTHER

## 2022-05-16 NOTE — PROGRESS NOTES
Past medical history: Adenocarcinoma prostate.  Hypertension.  Obesity.  Procedures/surgical history: EGD and colonoscopy (12/29/2016).  Ultrasound-guided prostate biopsy (09/29/2020).  Social history: Single.  Lives in [place], [place].  Does not work.  Has been smoking a pack of cigarettes daily for 28 years; for last 2 weeks, down to 4 cigarettes daily.  Has been drinking 12 packs of beer daily for 28 years; discontinued 1 month ago.  Used crack cocaine for 25 years; quit 12 years ago.  Family history: Father and paternal uncle experienced cancers (he does not know the details).  Health maintenance:   -EGD and colonoscopy (12/29/2016) (epigastric pain, hematochezia): Distal esophagitis, gastritis, hiatal hernia, very poor prep, and anal fissure (pathology: Esophageal biopsy: Active esophagitis with reactive epithelial atypia, no dysplasia; body of the stomach, biopsy: Mild chronic gastritis, no dysplasia)   -03/29/2017: EGD (history of esophagitis and gastritis): Esophagitis and gastritis improved      Reason for follow-up:   -Adenocarcinoma of prostate, AJCC prognostic stage IIIB, risk group very high      History of present illness:   55-year-old gentleman referred by Dr. Eriberto Sanon, with adenocarcinoma of prostate     He started with symptoms of abdominal bloating.  Sought care with his PCP.  PSA was drawn, 117.  Subsequently, referred for urology work-up.  Imaging studies as below.  He denied weight loss, bone pain, dysuria, or hematuria.  Reported nocturia x4.   -MRI prostate: Volume 78 cc; concerning lesion measuring involving the medial and lateral segments of both peripheral zones from base to the apex; 1-2 mm extracapsular extension was suspected at the level of the right peripheral zone mid gland and invasion of the seminal vesicle was suspected with right worse than left; lymph nodes were prominent but not large enough to satisfy MRI criteria for metastasis   -Bone scan: No metastatic  disease     09/10/2020: MRI pelvis with and without contrast:   -Prostate volume 78 cc   -Lesion 1: 36 mm x 26 mm x 12 mm coalescent lesion involving the medial and lateral segments of both peripheral zones from the base to the apex, highly concerning for malignancy; 1-2 mm extracapsular extension suspected at the level of the right peripheral zone and mid gland; invasion of the seminal vesicle strongly suspected, right worse than left   -Prominent lymph nodes are present in the pelvis but do not satisfy MR criteria for lymphadenopathy     09/10/2020: Whole-body nuclear medicine bone scan:   -No bone metastasis     09/29/2020: Ultrasound-guided prostate biopsy:   -12/12 cores positive for adenocarcinoma of prostate; Lupe grade 4+ 3 = score 7; perineural invasion identified; 70% - 80% needle core tissue involved     EBRT: 10/19/2020-12/01/2020 07/19/2021: Dr. Luiz Sanon:   -Presenting    -Had hematuria which resolved   -Not due for Eligard 20 October; continue Casodex   -PSA level dropping with ADT, 5.09   -Continue Casodex; Lupron started   -MRI prostate, bone scan: Likely T3 disease at a minimum; suspicious lymph nodes although do not meet criteria   -Completed EBRT   -Follow-up in 3 months with PSA     09/01/2021: CT A/P with and without contrast:   -No metastasis     09/01/2021: Whole-body nuclear medicine bone scan (comparison: Bone scan 09/10/2020; CT A/P 09/01/2021):   -No bone metastasis     PSA level:   -117 (08/31/2020); 5.09 (01/07/2021); 4.03 (04/12/2021)     09/14/2020: Lupron 22.5 mg IM   10/12/2020: Triptorelin 22.5 mg IM   04/12/2021: Triptorelin 22.5 mg IM    09/02/2021:   Presents for initial medical oncology consultation.  Pleasant gentleman.  In no acute discomfort.   -For symptoms of prostatism, especially nocturia, has been taking Flomax for last 1 year, with good relief of symptoms.  Currently, no frequency of micturition, no hesitancy, no dribbling, no dysuria or hematuria,  and no nocturia.  No pelvic pain.  No weakness, fatigue, malaise, anorexia, unintentional weight loss, any lumps or lymphadenopathy, chest pain, cough, dyspnea, abdominal pain, nausea, vomiting, etc.   -Reports some weakness with ongoing androgen deprivation therapy.  Some night sweats.  Some heartburn.  No GI bleeding.  Compliant with Casodex.      Interval history:     03/16/2022:   -Follows up with cardiology: CAD, nonobstructive per coronary angiogram; NSTEMI 10/2021; LVEF 50-55% on TTE (10/01/2021), etc.   -02/14/2022: Urology follow-up: Patient not getting regular Lupron shots secondary to being late; Lupron 45 mg IM (every 6 month Trelstar was due in October 2021 but apparently, patient did not present for the shot); referred to [facility] for salvage prostatectomy (TRUS for tissue diagnosis deferred to [facility])   -02/14/2022: Lupron 45 mg IM  No showed 2/28/2022   Presents for follow visit.  He was referred by referred by [facility] urology to [facility] for prostatectomy.  He tells me that nobody from [facility] has contacted him.  Denies any symptoms.  Mild dyspnea with physical activity.  No LUTS.  No hematuria.  No weakness or fatigue.    05/16/2022:  Presents for follow-up visit.  Doing well.  Says that he went to [facility] last week; apparently, they are planning prostate biopsy sometime in the next 2 weeks.  He is doing well.  Denies any symptoms.  No dysuria, frequency of micturition, hematuria, hesitancy, urgency, etc..  No weakness, fatigue, malaise, unusual headaches, bone pains, chest pain, cough, dyspnea, weakness, fatigue, etc..      Review of systems:   All systems reviewed, and found to be negative except for the symptoms detailed above.      Physical examination:   VITAL SIGNS:  Reviewed.      GENERAL:  In no apparent distress.    HEAD:  No signs of head trauma.   EYES:  Pupils are equal.  Extraocular motions intact.    EARS:  Hearing grossly intact.   MOUTH:  Oropharynx is normal.   NECK:  No adenopathy,  no JVD.      CHEST:  Chest with clear breath sounds bilaterally.  No wheezes, rales, or rhonchi.    CARDIAC:  Regular rate and rhythm.  S1 and S2, without murmurs, gallops, or rubs.   VASCULAR:  No Edema.  Peripheral pulses normal and equal in all extremities.   ABDOMEN:  Soft, without detectable tenderness.  No sign of distention.  No   rebound or guarding, and no masses palpated.   Bowel Sounds normal.   MUSCULOSKELETAL:  Good range of motion of all major joints. Extremities without clubbing, cyanosis or edema.    NEUROLOGIC EXAM:  Alert and oriented x 3.  No focal sensory or strength deficits.   Speech normal.  Follows commands.   PSYCHIATRIC:  Mood normal.   SKIN:  No rash or lesions.      Assessment:   #Adenocarcinoma of prostate:   - (08/31/2020)   -MRI pelvis (09/10/2020): 36 mm lesion; 1-2 mm extracapsular extension; invasion of seminal vesicle; prominent lymph nodes but not satisfying MRI criteria for lymphadenopathy   -Bone scan (09/10/2020): No bone metastasis   -Ultrasound-guided prostate biopsy (09/29/2020): 12/12 cores positive for adenocarcinoma prostate; Ada grade 4+ 3 = score 7; perineural invasion identified; 70% - 80% needle core tissue involved   >>   -On MRI, tumor invades seminal vesicle, therefore, cT3b   -Lymph nodes negative   -No distant metastasis   -Presenting    -Ada pattern 4+3 = score 7; therefore, grade group 3   >>   Therefore, AJCC prognostic stage IIIB   cT3b disease; therefore, risk group very high   >>  Plan: EBRT + ADT (x1.5-3 years; our plan, 3 years)   -S/p EBRT (10/19/2020-12/10/2020)   -ADT (to continue for 3 years, i.e., 10/2020-10/2023):   09/14/2020: Lupron 22.5 mg IM   10/12/2020: Triptorelin 22.5 mg IM   04/12/2021: Triptorelin 22.5 mg IM   -No metastasis on bone scan and CT A/P (09/01/2021)   >>>  Post RT PSA recurrence/recurrence in prostate/no distant metastasis:   -12/23/2021: PSA level 7.21 (was 2.55 on 09/02/2021   (12/23/2021: PSA level  rising; his Trelstar shot was due in October 2021 in his urologist's office which he never got)   -No metastasis on bone scan and CT C/A/P (01/04/2022)   -Follows up with cardiology: CAD, nonobstructive per coronary angiogram; NSTEMI 10/2021; LVEF 50-55% on TTE (10/01/2021), etc.   -01/11/2022: PSMA PET/CT: 2 foci of abnormal uptake in the prostate concerning for residual/recurrent tumor (periphery of the right hemisphere of the prostate at the level of midline maximum SUV 7.9, 1.1 x 0.7 cm; also, left hemisphere of prostate at the level of mid gland maximum SUV 8.3, 1.2 x 1.0 cm); no convincing PET evidence of metastatic disease   -01/12/2022: DEXA scan: Normal BMD   -01/07/2022: PSA 5.97 (was 7.21 on 12/23/2021) (testosterone level 200 on 01/07/2022, not castrate, because he missed Trelstar shot which was due in October 2021)   -02/14/2022: Urology follow-up:   Patient did not get ADT shot which was due in October 2021 due to being late (this explains testosterone level of 200 on 01/07/2022);   Lupron 45 mg IM, administered;   Rerred to [facility] for salvage prostatectomy (TRUS for tissue diagnosis deferred to [facility])   -02/14/2022: Lupron 45 mg IM administered       #ADT:   (Continue for 3 years, i.e., 10/2020-10/2023)   09/14/2020: Lupron 22.5 mg IM   10/12/2020: Triptorelin 22.5 mg IM   04/12/2021: Triptorelin 22.5 mg IM   -He missed his Trelstar shot which was due in October' 21   -01/07/2022: PSA 5.97 (was 7.21 on 12/23/2021); testosterone level 200.66 (not castrate)       #PSA level:   117 (08/31/2020); 5.09 (01/07/2021); 4.03 (04/12/2021); 2.55 (09/02/2021); 7.21 (12/23/2021) (rising because he missed Trelstar shot which was due October' 21); 5.97 (01/07/2022; testosterone level 200.66, not castrate because he missed Trelstar shot which was due 10/2021)      Plan:    -We referred him back to urology for TRUS biopsy of prostate   -Options at this time:   1. If TRUS biopsy positive, and no distant metastasis,  then: Observation; or RP + PLND; or brachytherapy; or cryotherapy; or high intensity focused ultrasound (HIFU), etc.;   2.  If TRUS biopsy negative, and no distant metastasis, then, observation; or ADT   >>  -02/24/2022: Lupron 45 mg IM   -Urology (01/24/2022): Referred the patient to [facility] for salvage prostatectomy  Plan: After prostatectomy, monitor   -If progression after prostatectomy, then, systemic therapy for castration naïve disease versus systemic therapy for M0 CRPC versus systemic therapy for M1 CRPC  >>>  -05/16/2022:  Tells me that [facility] urology are planning prostate biopsy     Post RT monitoring:   -Monitor PSA level every 3 months for 5 years (12/2020-12/2025); subsequently, every year   >>>  -Next PSA in 3 months (April)  -check PSA level and testosterone level today (05/16/2022)     -To prevent/minimize bone demineralization with ADT, recommend calcium and vitamin D supplements   -Calcium (6382-0845 mg daily from food and supplements) and vitamin D3 (400-1000 units daily)     -Normal BMD on baseline DEXA scan (01/12/2022)  -Repeat DEXA scan in 2 years (01/2024)     -Continue Flomax daily; currently, no irritative or obstructive LUTS     -Germline testing is recommended; ordered   -Molecular/biomarkers analysis of tumor is not routinely recommended     Follow-up in 2 months, after prostatectomy at [facility].     Above discussed with him.  All questions answered.   He understands and agrees this plan.

## 2022-05-16 NOTE — Clinical Note
Orders for today:  Check CBC, CMP, PSA level, testosterone level DEXA scan in January 2024 Genetic testing for homologous recombination gene mutations.  Follow-up in 2 months, after prostatectomy at [facility].

## 2022-05-20 NOTE — HISTORICAL [facility] CERNER
This is a historical note converted from [employer]. Formatting and pictures may have been removed.  Please reference [employer] for original formatting and attached multimedia. History of Present Illness  Past medical history: Adenocarcinoma prostate.? Hypertension.? Obesity.  Procedures/surgical history: EGD and colonoscopy (12/29/2016).? Ultrasound-guided prostate biopsy (09/29/2020).  Social history:?Single. ?Lives in [place], [place]. ?Does not work.? Has been smoking a pack of cigarettes daily for 28 years; for last 2 weeks, down to 4 cigarettes daily.? Has been drinking 12 packs of beer daily?for 28 years;?discontinued 1 month ago.? Used crack cocaine for 25 years; quit 12 years ago.  Family history: Father?and paternal uncle experienced cancers?(he?does not know the details).  Health maintenance:  -EGD and colonoscopy (12/29/2016) (epigastric pain, hematochezia): Distal esophagitis, gastritis, hiatal hernia, very poor prep, and anal fissure (pathology: Esophageal biopsy: Active esophagitis with reactive epithelial atypia, no dysplasia; body of the stomach, biopsy: Mild chronic gastritis, no dysplasia)  -03/29/2017: EGD (history of esophagitis and gastritis): Esophagitis and gastritis improved  ?  ?   Reason for follow-up:  -Adenocarcinoma of prostate, AJCC prognostic stage IIIB, risk group very high  ?  ?   History of present illness:  55-year-old gentleman referred by Dr. Eriberto Sanon, with adenocarcinoma of prostate  ?   He started with symptoms of abdominal bloating.? Sought care with his PCP.? PSA was drawn, 117.? Subsequently, referred for urology work-up.? Imaging studies as below.? He denied weight loss, bone pain, dysuria, or hematuria.? Reported nocturia x4.  -MRI prostate: Volume 78 cc; concerning lesion measuring involving the medial and lateral segments of both peripheral zones from base to the apex; 1-2 mm extracapsular extension was suspected at the level of the right peripheral zone mid gland  and invasion of the seminal vesicle was suspected with right worse than left; lymph nodes were prominent but not large enough to satisfy MRI criteria for metastasis  -Bone scan: No metastatic disease  ?   09/10/2020: MRI pelvis with and without contrast:  -Prostate volume 78 cc  -Lesion 1: 36 mm x 26 mm x 12 mm coalescent lesion involving the medial and lateral segments of both peripheral zones from the base to the apex, highly concerning for malignancy; 1-2 mm extracapsular extension suspected at the level of the right peripheral zone and mid gland; invasion of the seminal vesicle strongly suspected, right worse than left  -Prominent lymph nodes are present in the pelvis but do not satisfy MR criteria for lymphadenopathy  ?   09/10/2020: Whole-body nuclear medicine bone scan:  -No bone metastasis  ?  09/29/2020: Ultrasound-guided prostate biopsy:  -12/12 cores positive for adenocarcinoma of prostate; Lupe grade 4+ 3 = score 7; perineural invasion identified; 70% - 80% needle core tissue involved  ?  EBRT: 10/19/2020-12/01/2020  ?  07/19/2021: Dr. Luiz Sanon:  -Presenting   -Had hematuria which resolved  -Not due for Eligard 20 October; continue Casodex  -PSA level dropping with ADT, 5.09  -Continue Casodex; Lupron started  -MRI prostate, bone scan: Likely T3 disease at a minimum; suspicious lymph nodes although do not meet criteria  -Completed EBRT  -Follow-up in 3 months with PSA  ?  09/01/2021: CT A/P with and without contrast:  -No metastasis  ?  09/01/2021: Whole-body nuclear medicine bone scan (comparison: Bone scan 09/10/2020; CT A/P 09/01/2021):  -No bone metastasis  ?  PSA level:  -117 (08/31/2020); 5.09 (01/07/2021); 4.03 (04/12/2021)  ?  09/14/2020: Lupron 22.5 mg IM  10/12/2020: Triptorelin 22.5 mg IM  04/12/2021: Triptorelin 22.5 mg IM  ?  09/02/2021:  Presents?for initial?medical oncology consultation. ?Pleasant gentleman.? In no acute discomfort.  -For symptoms of prostatism, especially  nocturia,?has been taking Flomax for last 1 year,?with good relief of symptoms. ?Currently,?no frequency of micturition,?no hesitancy, no dribbling,?no dysuria?or hematuria, and no nocturia.? No pelvic pain.? No weakness, fatigue, malaise, anorexia, unintentional weight loss,?any lumps or lymphadenopathy,?chest pain, cough, dyspnea,?abdominal pain, nausea, vomiting, etc.  -Reports some weakness with ongoing androgen deprivation therapy.? Some night sweats.? Some heartburn. ?No GI bleeding.? Compliant with Casodex.  ?  ?  Interval history:  ?  01/07/2022:  -Follows up with cardiology: CAD, nonobstructive per coronary angiogram; NSTEMI 10/2021; LVEF 50-55% on TTE (10/01/2021), etc.  -01/11/2022: [facility] a PET/CT: 2 foci of abnormal uptake in the prostate concerning for residual/recurrent tumor (periphery of the right hemisphere of the prostate at the level of midline maximum SUV 7.9, 1.1 x 0.7 cm; also, left hemisphere of prostate at the level of mid gland maximum SUV 8.3, 1.2 x 1.0 cm); no convincing PET evidence of metastatic disease  -01/12/2022: DEXA scan: Normal BMD  -01/07/2022: PSA 5.97 (was 7.21 on 12/23/2021); testosterone level 200.66 (not castrate)  Presents for a follow-up visit.? No complaints.? Urinating?normally. ?No dysuria, frequency of micturition,?hesitancy, urgency, dribbling, or hematuria.? Apparently, he is having trouble?scheduling appointment with urology; will call their office.  ?  ?  Review of systems:  All systems reviewed, and found to be negative except for the symptoms detailed above.  ?  ?  Physical examination:  VITAL SIGNS:? Reviewed.? ?  GENERAL:? In no apparent distress.?  HEAD:? No signs of head trauma.  EYES:? Pupils are equal.? Extraocular motions intact.?  EARS:? Hearing grossly intact.  MOUTH:? Oropharynx is normal.  NECK:? No adenopathy, no JVD.? ?  CHEST:? Chest with clear breath sounds bilaterally.? No wheezes, rales, or rhonchi.?  CARDIAC:? Regular rate and rhythm.? S1 and S2,  without murmurs, gallops, or rubs.  VASCULAR:? No Edema.? Peripheral pulses normal and equal in all extremities.  ABDOMEN:? Soft, without detectable tenderness.? No sign of distention.? No? ?rebound or guarding, and no masses palpated.? ?Bowel Sounds normal.  MUSCULOSKELETAL:? Good range of motion of all major joints. Extremities without clubbing, cyanosis or edema.?  NEUROLOGIC EXAM:? Alert and oriented x 3.? No focal sensory or strength deficits.? ?Speech normal.? Follows commands.  PSYCHIATRIC:? Mood normal.  SKIN:? No rash or lesions.  ?  ?  Assessment:  #Adenocarcinoma of prostate:  - (08/31/2020)  -MRI pelvis (09/10/2020): 36 mm lesion; 1-2 mm extracapsular extension; invasion of seminal vesicle; prominent lymph nodes but not satisfying MRI criteria for lymphadenopathy  -Bone scan (09/10/2020): No bone metastasis  -Ultrasound-guided prostate biopsy (09/29/2020): 12/12 cores positive for adenocarcinoma prostate; Russell grade 4+ 3 = score 7; perineural invasion identified; 70% - 80% needle core tissue involved  >>  -On MRI, tumor invades seminal vesicle, therefore, cT3b  -Lymph nodes negative  -No distant metastasis  -Presenting   -Lupe pattern 4+3 = score 7; therefore, grade group 3  >>  Therefore, AJCC prognostic stage IIIB  cT3b disease; therefore, risk group very high  >>  Plan: EBRT + ADT (x1.5-3 years; our plan, 3 years)  -S/p EBRT (10/19/2020-12/10/2020)  -ADT?(to continue for 3 years, i.e.,?10/2020-10/2023):  09/14/2020: Lupron 22.5 mg IM  10/12/2020: Triptorelin 22.5 mg IM  04/12/2021: Triptorelin 22.5 mg IM  -No metastasis on bone scan and CT A/P (09/01/2021)  >>>  Post RT PSA recurrence/recurrence of prostate/no distant metastasis:  -12/23/2021: PSA level 7.21 (was 2.55 on 09/02/2021  (12/23/2021: PSA level rising;?his Trelstar shot was due in October 2021?in his urologists office?which he never got)  -No metastasis?on bone scan and CT C/A/P (01/04/2022)  -Follows up with cardiology:  CAD, nonobstructive per coronary angiogram; NSTEMI 10/2021; LVEF 50-55% on TTE (10/01/2021), etc.  -01/11/2022: [facility] PET/CT: 2 foci of abnormal uptake in the prostate concerning for residual/recurrent tumor (periphery of the right hemisphere of the prostate at the level of midline maximum SUV 7.9, 1.1 x 0.7 cm; also, left hemisphere of prostate at the level of mid gland maximum SUV 8.3, 1.2 x 1.0 cm); no convincing PET evidence of metastatic disease  -01/12/2022: DEXA scan: Normal BMD  -01/07/2022: PSA 5.97 (was 7.21 on 12/23/2021); testosterone level 200.66 (not castrate)?(he missed?Trelstar shot which was due?in 2021)  ?  ?  #ADT:  (Continue for 3 years, i.e.,?10/2020-10/2023)  09/14/2020: Lupron 22.5 mg IM  10/12/2020: Triptorelin 22.5 mg IM  04/12/2021: Triptorelin 22.5 mg IM  -He missed his Trelstar shot?which was due?in October 21  -01/07/2022: PSA 5.97 (was 7.21 on 12/23/2021); testosterone level 200.66 (not castrate)  ?  ?  #PSA level:  117 (08/31/2020); 5.09 (01/07/2021); 4.03 (04/12/2021); 2.55 (09/02/2021);?7.21?(12/23/2021)?(rising?because dismissed?Trelstar shot which was due October 21);?5.97 (01/07/2022;?testosterone level 200.66, not castrate?because he missed?Trelstar shot which was due 10/2021)  ?  ?  Plan:  -AJCC prognostic stage IIIB  -Risk group: Very high  >>>  Treatment plan:  EBRT + ADT (1.5-3 years)  -S/p EBRT (10/19/2020-12/10/2020)  -ADT started 09/14/2020  -ADT to continue for 3 years (until 09/2023)  -Triptorelin 22.5 mg IM on 04/12/2021; next shot was due in 6 months?(October 21) (deferred to urology, Dr. Eriberto Sanon)?  >>>  -PSA: 5.97 (01/07/2022); 7.21 (12/23/2021); 2.55 (09/02/2021), etc.  -Patient has experienced?post RT PSA recurrence  -No metastasis on bone scan and CT C/A/P (01/04/2022)  -PSMA PET CT (11/11/2022): 2 foci of residual/recurrent tumor in the prostate; no distant metastasis  >>>  -Has been referred back to urology for TRUS biopsy of prostate  -Options at this  time:  1. If TRUS biopsy positive, and no distant metastasis, then: Observation; or RP + PLND; or brachytherapy; or cryotherapy; or high intensity focused ultrasound (HIFU), etc.;  2.? If TRUS biopsy negative, and no distant metastasis, then, observation; or ADT  ?  Post RT monitoring:  -Monitor PSA level every 3 months for 5 years (2020-2025);?subsequently, every year  >>>  -Next PSA?in 3 months (April)  ?  -To prevent/minimize bone demineralization with ADT, recommend calcium and vitamin D supplements  -Calcium (0731-3214 mg daily from food and supplements) and vitamin D3 (400-1000 units daily)  ?  -Normal BMD on baseline DEXA scan (2022)  -Repeat DEXA scan in 2 years (2024)  ?  -Continue?Casodex 50 mg p.o. daily?(concurrent with ADT)  -Continue Flomax daily; currently,?no irritative or obstructive?LUTS  ?  -Germline testing is recommended; ordered  -Molecular/biomarkers analysis of tumor is not routinely recommended  ?  Follow-up in 6 weeks, after urology evaluation/possible biopsy, etc.  ?  Above discussed with him.? All questions answered.  Discussed labs?and scans and gave him copies of relevant reports.  Compliance with treatment plan emphasized.  ?  He understands and agrees this plan.  ---------------------  ?  ?  Discussion:  -cT3b disease; therefore, risk group very high  ?  -Germline testing recommended  -Molecular/biomarkers analysis of tumor not really recommended  ?  Recommended therapy:  1.? EBRT + ADT (1.5-3 years; category 1) +/- docetaxel) for very high risk only)  2.? EBRT + brachytherapy + ADT (1-3 years; category 1 for ADT)  3.? RP + PLND  >>>  If PSA shelia after RT (the lowest value reached after EBRT or brachytherapy), then, monitorin.? PSA every 6-12 months for 5 years (PSA as frequently as every 3 months may be necessary to clarify disease status, especially in high risk men), then every year  2.? MADHU every year, but may be omitted if PSA undetectable  ?  ADT for  clinically localized (N0, M0) disease:  -Giving ADT therapy before, during, and/or after radiation (neoadjuvant, concurrent, and/or adjuvant ADT) prolongs survival in selected radiation managed patients  -Options:  1.? LHRH agonist alone  2.? LHRH agonist + first generation antiandrogen (nilutamide, flutamide, or bicalutamide)  3.? LHRH antagonist (degarelix,.relugolix)  -Studies of short-term (4-6 months) and long-term (2-3 years) neoadjuvant, concurrent, and/or adjuvant ADT all have used combined androgen blockade; whether the addition of an antiandrogen is necessary, requires further study  ?  ?  -Studies of short-term (4-6 months) and long-term (2-3 years) neoadjuvant, concurrent, and/or adjuvant ADT?all have used?combined androgen blockade  -Whether the addition of an antiandrogen is necessary, requires?further study  >>>  -In combination with ADT, first generation antiandrogen is recommended  -He is on Casodex; continue?(however, he has no idea whether he is taking this medication or not)  -Although, along with EBRT, abiraterone can be used with ADT for 2 years for very high risk disease only, however, per NCCN?guidelines,?along with EBRT, ADT (for 1.5-3 years) is category 1 recommendation  -Therefore,?there was?no strong?indication of adding abiraterone?  ?  Monitoring/surveillance of ADT:  -Side effects: Hot flashes, loss of libido, erectile dysfunction function, shrinkage of penis and testicles, loss of muscle mass and strength, fatigue, anemia, breast enlargement and tenderness/soreness, depression, mood swings, hair loss, osteoporotic porosis, greater incidence of clinical fractures, chills, obesity, insulin resistance, alterations in lipids, and a greater risk for diabetes and cardiovascular disease  -Physical activity should be recommended  -Use of statins should be should be considered  -Screening and treatment of osteoporosis  -Calcium (9599-5307 mg daily from food and supplements) and vitamin D3  (400-1000 units daily)  -Treatment of osteopenia and osteoporosis  -Baseline DEXA scan; follow-up DEXA scan after 1 year of therapy  -Denosumab, zoledronic acid, or alendronate should be considered when the absolute fracture risk warrants drug therapy  Physical Exam  Vitals & Measurements  T:?36.8? ?C (Oral)? HR:?94(Apical)? RR:?19? BP:?154/100? SpO2:?100%?  HT:?172?cm? WT:?94.5?kg?   Problem List/Past Medical History  Ongoing  Elevated PSA  History of non-ST elevation myocardial infarction (NSTEMI)  Hypertension  Obesity  Prostate cancer  Historical  No qualifying data  Procedure/Surgical History  Catheter placement in coronary artery(s) for coronary angiography, including intraprocedural injection(s) for coronary angiography, imaging supervision and interpretation; with left heart catheterization including intraprocedural injection(s) for left patrick (12/08/2021)  Fluoroscopy of Left Heart using Low Osmolar Contrast (12/08/2021)  Fluoroscopy of Multiple Coronary Arteries using Low Osmolar Contrast (12/08/2021)  Measurement of Cardiac Sampling and Pressure, Left Heart, Percutaneous Approach (12/08/2021)  Insertion of Infusion Device into Superior Vena Cava, Percutaneous Approach (10/12/2021)  Transesophageal Echo (for [facility]) (None) (10/08/2021)  Ultrasonography of Heart with Aorta, Transesophageal (10/08/2021)  Biopsy Prostate (None) (09/29/2020)  Biopsy, prostate; needle or punch, single or multiple, any approach (09/29/2020)  Excision of Prostate, Percutaneous Approach, Diagnostic (09/29/2020)  Esophagogastroduodenoscopy (03/29/2017)  Esophagogastroduodenoscopy, flexible, transoral; diagnostic, including collection of specimen(s) by brushing or washing, when performed (separate procedure) (03/29/2017)  Inspection of Upper Intestinal Tract, Via Natural or Artificial Opening Endoscopic (03/29/2017)  Biopsy Gastrointestional (12/29/2016)  Colonoscopy (12/29/2016)  Colonoscopy, flexible; diagnostic, including  collection of specimen(s) by brushing or washing, when performed (separate procedure) (12/29/2016)  Esophagogastroduodenoscopy (12/29/2016)  Esophagogastroduodenoscopy, flexible, transoral; with biopsy, single or multiple (12/29/2016)  Excision of Lower Esophagus, Via Natural or Artificial Opening Endoscopic, Diagnostic (12/29/2016)  Excision of Stomach, Via Natural or Artificial Opening Endoscopic, Diagnostic (12/29/2016)  Inspection of Lower Intestinal Tract, Via Natural or Artificial Opening Endoscopic (12/29/2016)   Medications  aspirin 81 mg oral tablet, CHEWABLE, 81 mg= 1 tab(s), Oral, Daily, 6 refills  atorvastatin 40 mg oral tablet, 40 mg= 1 tab(s), Oral, At Bedtime, 6 refills  ergocalciferol 50,000 intl units (1.25 mg) oral capsule, 12383 IntUnit= 1 cap(s), Oral, qWeek  metoprolol succinate 25 mg oral tablet, extended release, 25 mg= 1 tab(s), Oral, Daily, 6 refills  Nitrostat 0.4 mg sublingual tab, 0.4 mg= 1 tab(s), SL, q5min, PRN, 1 refills  Plavix 75 mg oral tablet, 75 mg= 1 tab(s), Oral, Daily, 6 refills  tamsulosin 0.4 mg oral capsule, See Instructions  Allergies  No Known Allergies  Social History  Abuse/Neglect  No, No, Yes, 01/07/2022  Alcohol  Current, 1-2 times per month, 01/07/2022  Employment/School  Unemployed, 07/19/2021  Employed, Activity level: Heavy physical work., 05/13/2015  Exercise  Exercise frequency: 1-2 times/week. Self assessment: Fair condition. Exercise type: push-ups., 05/13/2015  Financial/Legal Situation  None, 07/19/2021  Home/Environment  Lives with Significant other. Living situation: Home/Independent. Alcohol abuse in household: No. Substance abuse in household: No. Smoker in household: No. Feels unsafe at home: No. Safe place to go: Yes. Family/Friends available for support: Yes. Major illness in household: No. Risks in environment: Smoke/CO detectors absent., 05/13/2015    Never in , 07/19/2021  Nutrition/Health  Regular, Caffeine intake amount: coffee 1  cup every morning. Wants to lose weight: No. Sleeping concerns: No. Feels highly stressed: No., 05/13/2015  Sexual  Sexually active: Yes., 07/19/2021  Spiritual/Cultural  Yarsanism, 07/19/2021  Substance Use - Denies Substance Abuse, 05/13/2015  Past, Cocaine, 01/07/2022  Tobacco  10 or more cigarettes (1/2 pack or more)/day in last 30 days, Cigarettes, N/A, 01/27/2022  4 or less cigarettes(less than 1/4 pack)/day in last 30 days, No, 01/07/2022  Family History  Cancer - unknown origin: Father.  Kidney failure: Mother.  Immunizations  Vaccine Date Status Comments   COVID-19 MRNA, LNP-S, PF- Pfizer 07/19/2021 Recorded    COVID-19 MRNA, LNP-S, PF- Pfizer 06/28/2021 Recorded    influenza virus vaccine, inactivated 10/05/2020 Given    influenza virus vaccine, inactivated - Not Given Patient Refuses

## 2022-05-25 DIAGNOSIS — C61 PROSTATE CANCER: Primary | ICD-10-CM

## 2022-06-06 DIAGNOSIS — E78.5 HYPERLIPIDEMIA, UNSPECIFIED HYPERLIPIDEMIA TYPE: Primary | ICD-10-CM

## 2022-06-07 ENCOUNTER — OFFICE VISIT (OUTPATIENT)
Dept: CARDIOLOGY | Facility: CLINIC | Age: 57
End: 2022-06-07
Payer: MEDICAID

## 2022-06-07 VITALS
RESPIRATION RATE: 17 BRPM | SYSTOLIC BLOOD PRESSURE: 148 MMHG | HEIGHT: 68 IN | OXYGEN SATURATION: 100 % | DIASTOLIC BLOOD PRESSURE: 100 MMHG | BODY MASS INDEX: 31.22 KG/M2 | HEART RATE: 90 BPM | WEIGHT: 206 LBS

## 2022-06-07 DIAGNOSIS — I25.10 CORONARY ARTERY DISEASE INVOLVING NATIVE CORONARY ARTERY OF NATIVE HEART WITHOUT ANGINA PECTORIS: ICD-10-CM

## 2022-06-07 DIAGNOSIS — I10 PRIMARY HYPERTENSION: ICD-10-CM

## 2022-06-07 DIAGNOSIS — R06.09 DOE (DYSPNEA ON EXERTION): Primary | ICD-10-CM

## 2022-06-07 DIAGNOSIS — Z72.0 TOBACCO USE: ICD-10-CM

## 2022-06-07 PROCEDURE — 3008F PR BODY MASS INDEX (BMI) DOCUMENTED: ICD-10-PCS | Mod: CPTII,,, | Performed by: NURSE PRACTITIONER

## 2022-06-07 PROCEDURE — 3008F BODY MASS INDEX DOCD: CPT | Mod: CPTII,,, | Performed by: NURSE PRACTITIONER

## 2022-06-07 PROCEDURE — 1160F PR REVIEW ALL MEDS BY PRESCRIBER/CLIN PHARMACIST DOCUMENTED: ICD-10-PCS | Mod: CPTII,,, | Performed by: NURSE PRACTITIONER

## 2022-06-07 PROCEDURE — 3077F SYST BP >= 140 MM HG: CPT | Mod: CPTII,,, | Performed by: NURSE PRACTITIONER

## 2022-06-07 PROCEDURE — 3080F DIAST BP >= 90 MM HG: CPT | Mod: CPTII,,, | Performed by: NURSE PRACTITIONER

## 2022-06-07 PROCEDURE — 1160F RVW MEDS BY RX/DR IN RCRD: CPT | Mod: CPTII,,, | Performed by: NURSE PRACTITIONER

## 2022-06-07 PROCEDURE — 99214 OFFICE O/P EST MOD 30 MIN: CPT | Mod: PBBFAC | Performed by: NURSE PRACTITIONER

## 2022-06-07 PROCEDURE — 3077F PR MOST RECENT SYSTOLIC BLOOD PRESSURE >= 140 MM HG: ICD-10-PCS | Mod: CPTII,,, | Performed by: NURSE PRACTITIONER

## 2022-06-07 PROCEDURE — 1159F MED LIST DOCD IN RCRD: CPT | Mod: CPTII,,, | Performed by: NURSE PRACTITIONER

## 2022-06-07 PROCEDURE — 99214 OFFICE O/P EST MOD 30 MIN: CPT | Mod: S$PBB,,, | Performed by: NURSE PRACTITIONER

## 2022-06-07 PROCEDURE — 99214 PR OFFICE/OUTPT VISIT, EST, LEVL IV, 30-39 MIN: ICD-10-PCS | Mod: S$PBB,,, | Performed by: NURSE PRACTITIONER

## 2022-06-07 PROCEDURE — 1159F PR MEDICATION LIST DOCUMENTED IN MEDICAL RECORD: ICD-10-PCS | Mod: CPTII,,, | Performed by: NURSE PRACTITIONER

## 2022-06-07 PROCEDURE — 3080F PR MOST RECENT DIASTOLIC BLOOD PRESSURE >= 90 MM HG: ICD-10-PCS | Mod: CPTII,,, | Performed by: NURSE PRACTITIONER

## 2022-06-07 RX ORDER — ATORVASTATIN CALCIUM 40 MG/1
40 TABLET, FILM COATED ORAL DAILY
Qty: 90 TABLET | Refills: 2 | Status: SHIPPED | OUTPATIENT
Start: 2022-06-07 | End: 2022-12-06 | Stop reason: SDUPTHER

## 2022-06-07 RX ORDER — NAPROXEN SODIUM 220 MG/1
81 TABLET, FILM COATED ORAL DAILY
Qty: 90 TABLET | Refills: 2 | Status: SHIPPED | OUTPATIENT
Start: 2022-06-07 | End: 2022-09-06 | Stop reason: SDUPTHER

## 2022-06-07 RX ORDER — CLOPIDOGREL BISULFATE 75 MG/1
75 TABLET ORAL DAILY
Qty: 90 TABLET | Refills: 2 | Status: SHIPPED | OUTPATIENT
Start: 2022-06-07 | End: 2022-12-06 | Stop reason: ALTCHOICE

## 2022-06-07 RX ORDER — METOPROLOL SUCCINATE 25 MG/1
50 TABLET, EXTENDED RELEASE ORAL DAILY
Qty: 180 TABLET | Refills: 2 | Status: SHIPPED | OUTPATIENT
Start: 2022-06-07 | End: 2022-09-06 | Stop reason: SDUPTHER

## 2022-06-07 NOTE — PROGRESS NOTES
CHIEF COMPLAINT:   Chief Complaint   Patient presents with    CLEARANCE     Pt needs clearance for colonoscopy for dx prostate CA;  Complains of SOB with minimal exertion and ankle swelling    Dizziness     Last week; lasted about 20 min                   Review of patient's allergies indicates:  No Known Allergies                                       HPI:  Burton Vasquez 56 y.o. male with a past medical history of hypertension, gastritis, esophagitis, prostate cancer status post radiation, chemotherapy, and Tobacco Abuse presents for follow-up and ongoing care.  Patient underwent coronary angiogram procedure on December 8, 2021 which revealed nonobstructive coronary artery disease. See report below. Patient had a hospital admission from October 2021 with NSTEMI, troponin peak 0.12. He had a subsequent Lexiscan stress test on 10.4.21 that revealed a small area of mild inferior ischemia. He completed an Echocardiogram 10.4.21 that revealed a left ventricular ejection fraction measured approximately 50 to 55%. (see report below).  He was also noted to have positive blood cultures for Streptococcus mitis bacteremia and had a subsequent TAQUERIA on 10.8.21 in which a 12 x 7 mm echodensity noted on MV (consistent with being a vegetation). The patient has had repeat negative cultures. The patient received oral antibiotics and received 2-week course of outpatient IV antibiotics.     Patient reports shortness of breath with minimal exertion as well as occasional bilateral ankle edema.  Patient states the shortness of breath has worsened since his previous appointment .  He denies chest pain , palpitations, orthopnea , or syncope .  He does report an episode of dizziness last week that he states lasted approximately 20 minutes.  Patient states he was sitting on the sofa when the dizzines occurred.  He denies near syncope.  Patient states he is currently smoking approximately 10 cigarettes/day, but continues to try to quit on  his own. He states he is not interested in a referral to the smoking cessation program at this time.  He is requiring cardiac risk stratification for colonoscopy procedure.      Coronary Angiogram December 8, 2021:  Left main: Large vessel that tapers distally. No stenosis.  LAD: Large vessel. 20 to 30% proximal/mid segment stenosis.  Diagonal 1: Small vessel. No stenosis.  Diagonal 2: Tiny size vessel. No stenosis.  Circumflex: Large vessel. No stenosis.  Obtuse marginal 1: Small vessel. 50% ostial stenosis.  Left PDA: Small size vessel. No stenosis.  RCA: Medium sized vessel. 2 sequential lesions, both with 50% stenosis mid vessel.  PLB: Tiny to small vessel. No stenosis.  PDA: Tiny to small vessel. No stenosis.  Angiogram summary:  Coronaries: Nonobstructive coronary artery disease  LVEDP: Normal end-diastolic pressure.      TTE 10.1.21  Left ventricular ejection fraction is measured approximately 50 to 55%  Structurally normal mitral valve  Mild mitral regurgitation  Structurally normal trileaflet aortic valve  Tricuspid valve is structurally normal  There is mild tricuspid regurgitation with RVSP estimated at 28 mmHg  The pulmonic valve was not well visualized  Normal size left atrium  Normal right atrial size  Normal right ventricular size with preserved RV function                                                                                                                                                                                                                                                                                                                                                                                                                                                                                         Patient Active Problem List   Diagnosis    Coronary artery disease involving native coronary artery of native heart without angina pectoris    Primary hypertension    Tobacco use  "    History reviewed. No pertinent surgical history.  Social History     Socioeconomic History    Marital status: Single   Tobacco Use    Smoking status: Current Every Day Smoker     Packs/day: 1.00     Types: Cigarettes    Smokeless tobacco: Never Used   Substance and Sexual Activity    Alcohol use: Not Currently    Drug use: Never        History reviewed. No pertinent family history.      Current Outpatient Medications:     clopidogreL (PLAVIX) 75 mg tablet, TAKE ONE TABLET once a day, Disp: , Rfl:     metoprolol succinate (TOPROL-XL) 25 MG 24 hr tablet, Take 25 mg by mouth., Disp: , Rfl:     tamsulosin (FLOMAX) 0.4 mg Cap,  See Instructions, TAKE ONE CAPSULE BY MOUTH once a day, # 30 cap(s), 1 Refill(s), Pharmacy: [facility], 172, cm, Height/Length Dosing, 01/07/22 12:44:00 CST, 93.8, kg, Weight Dosing, 01/07/22 12:44:00 CST, Disp: , Rfl:     aspirin 81 MG Chew, Take 81 mg by mouth., Disp: , Rfl:     atorvastatin (LIPITOR) 40 MG tablet, Take 40 mg by mouth., Disp: , Rfl:     ergocalciferol (ERGOCALCIFEROL) 50,000 unit Cap, Take 50,000 Int'l Units by mouth., Disp: , Rfl:     levoFLOXacin (LEVAQUIN) 500 MG tablet, Take 500 mg by mouth once daily., Disp: , Rfl:      ROS:                                                                                                                                                                             Review of Systems   Constitutional: Negative.    Respiratory: Positive for shortness of breath.    Cardiovascular: Positive for leg swelling.   Gastrointestinal: Negative.    Musculoskeletal: Negative.    Skin: Negative.    Neurological: Positive for dizziness.   Psychiatric/Behavioral: Negative.         Blood pressure (!) 148/100, pulse 90, resp. rate 17, height 5' 8" (1.727 m), weight 93.4 kg (206 lb), SpO2 100 %.   PE:  Physical Exam  Constitutional:       Appearance: Normal appearance.   HENT:      Head: Normocephalic.   Eyes:      Extraocular Movements: " Extraocular movements intact.   Cardiovascular:      Rate and Rhythm: Normal rate and regular rhythm.   Pulmonary:      Effort: Pulmonary effort is normal.   Abdominal:      Palpations: Abdomen is soft.   Musculoskeletal:         General: Normal range of motion.      Cervical back: Neck supple.   Skin:     General: Skin is warm and dry.   Neurological:      General: No focal deficit present.      Mental Status: He is alert and oriented to person, place, and time.   Psychiatric:         Mood and Affect: Mood normal.        ASSESSMENT/PLAN:  Coronary Artery Disease - nonobstructive per Coronary Angiogram 12.8.21  NSTEMI in Oct. 2021  - Lexiscan stress test- small area of mild inferior ischemia with reversibility (10.4.21)  - LVEF -50-55% per TTE 10.1.21  - Denies chest pain  - Reports SOB with minimal exertion  - Continue ASA, Plavix, Atorvastatin, metoprolol succinate (increasing dose to 50mg daily), and SL nitro prn  - Counseled on heart healthy diet, exercise, and smoking cessation.    RICE   -Worsened   - Will repeat Echocardiogram     HTN   - BP not at goal - 148/100  - Increase Metoprolol Succinate to 50mg daily    - Nurse Visit in 2 weeks for BP check  - Counseled on low sodium diet and exercise    Tobacco Use  - States he currently smokes 10 cigarettes per day - states he will try to quit on his own   - Counseled on the importance of smoking cessation    Prostate Cancer  - Follow up with Oncology as directed    Cardiac Risk Stratification  - According to the revised cardiac risk index (Graeme Criteria), patient is considered low risk for cardiac events for low risk colonoscopy procedure.  Okay to hold Aspirin and Plavix for 5 days prior to procedure, but should resume both the day after if no obvious bleeding issues are noted.  Of note, patient does not need to delay colonoscopy procedure due to pending Echocardiogram.      Echocardiogram  Nurse Visit in 2 weeks for BP check  Follow up in Cardiology Clinic in 3  months

## 2022-06-07 NOTE — PATIENT INSTRUCTIONS
Hold aspirin and plavix 5 days before colonscopy    Complete Echocardiogram - CALL [phone] TO SCHEDULE THIS TEST    Get Fasting labs completed before next appt.

## 2022-06-17 ENCOUNTER — HOSPITAL ENCOUNTER (OUTPATIENT)
Dept: CARDIOLOGY | Facility: HOSPITAL | Age: 57
Discharge: HOME | End: 2022-06-17
Attending: NURSE PRACTITIONER
Payer: MEDICAID

## 2022-06-17 VITALS — HEIGHT: 68 IN | WEIGHT: 206 LBS | BODY MASS INDEX: 31.22 KG/M2

## 2022-06-17 DIAGNOSIS — R06.09 DOE (DYSPNEA ON EXERTION): ICD-10-CM

## 2022-06-17 LAB
AV INDEX (PROSTH): 1.02
AV MEAN GRADIENT: 3 MMHG
AV PEAK GRADIENT: 5 MMHG
AV VALVE AREA: 3.2 CM2
AV VELOCITY RATIO: 0.96
BSA FOR ECHO PROCEDURE: 2.12 M2
CV ECHO LV RWT: 0.41 CM
DOP CALC AO PEAK VEL: 1.08 M/S
DOP CALC AO VTI: 19.4 CM
DOP CALC LVOT AREA: 3.1 CM2
DOP CALC LVOT DIAMETER: 2 CM
DOP CALC LVOT PEAK VEL: 1.04 M/S
DOP CALC LVOT STROKE VOLUME: 62.17 CM3
DOP CALC MV VTI: 33.6 CM
DOP CALCLVOT PEAK VEL VTI: 19.8 CM
E WAVE DECELERATION TIME: 202.9 MSEC
E/A RATIO: 1.94
ECHO LV POSTERIOR WALL: 1.16 CM (ref 0.6–1.1)
EJECTION FRACTION: 60 %
FRACTIONAL SHORTENING: 38 % (ref 28–44)
HR MV ECHO: 85 BPM
INTERVENTRICULAR SEPTUM: 1.09 CM (ref 0.6–1.1)
LEFT ATRIUM SIZE: 4.2 CM
LEFT ATRIUM VOLUME INDEX MOD: 23.6 ML/M2
LEFT ATRIUM VOLUME MOD: 48.82 CM3
LEFT INTERNAL DIMENSION IN SYSTOLE: 3.49 CM (ref 2.1–4)
LEFT VENTRICLE DIASTOLIC VOLUME INDEX: 75.17 ML/M2
LEFT VENTRICLE DIASTOLIC VOLUME: 155.6 ML
LEFT VENTRICLE MASS INDEX: 125 G/M2
LEFT VENTRICLE SYSTOLIC VOLUME INDEX: 24.4 ML/M2
LEFT VENTRICLE SYSTOLIC VOLUME: 50.54 ML
LEFT VENTRICULAR INTERNAL DIMENSION IN DIASTOLE: 5.63 CM (ref 3.5–6)
LEFT VENTRICULAR MASS: 259.23 G
LV LATERAL E/E' RATIO: 12.75 M/S
LVOT MG: 1.96 MMHG
LVOT MV: 0.64 CM/S
MV MEAN GRADIENT: 4 MMHG
MV PEAK A VEL: 0.79 M/S
MV PEAK E VEL: 1.53 M/S
MV PEAK GRADIENT: 9 MMHG
MV VALVE AREA BY CONTINUITY EQUATION: 1.85 CM2
PISA MRMAX VEL: 6.77 M/S
PISA TR MAX VEL: 2.44 M/S
RA WIDTH: 4.7 CM
TDI LATERAL: 0.12 M/S
TR MAX PG: 24 MMHG
TRICUSPID ANNULAR PLANE SYSTOLIC EXCURSION: 2.72 CM

## 2022-06-17 PROCEDURE — 93306 TTE W/DOPPLER COMPLETE: CPT

## 2022-06-23 ENCOUNTER — DOCUMENTATION ONLY (OUTPATIENT)
Dept: CARDIOLOGY | Facility: HOSPITAL | Age: 57
End: 2022-06-23
Payer: MEDICAID

## 2022-06-23 NOTE — PROGRESS NOTES
Preoperative cardiovascular risk assessment    Pt needs to undergo prostate biopsy on July 14th, 2022.  He is s/p coronary angiogram on 12/8/2021 that revealed nonobstructive CAD.  Pt is low cardiovascular risk for a low to moderate risk procedure.  He can hold plavix for 5 days prior to the procedure. If necessary, can also hold aspirin for 5-7 days prior to the procedure.     Bonny Coelho MD  Cardiology staff

## 2022-07-11 ENCOUNTER — TELEPHONE (OUTPATIENT)
Dept: CARDIOLOGY | Facility: CLINIC | Age: 57
End: 2022-07-11
Payer: MEDICAID

## 2022-07-11 ENCOUNTER — HOSPITAL ENCOUNTER (OUTPATIENT)
Dept: RADIOLOGY | Facility: HOSPITAL | Age: 57
Discharge: HOME | End: 2022-07-11
Attending: INTERNAL MEDICINE
Payer: MEDICAID

## 2022-07-11 DIAGNOSIS — C61 PROSTATE CANCER: ICD-10-CM

## 2022-07-11 DIAGNOSIS — Z72.0 TOBACCO ABUSE: ICD-10-CM

## 2022-07-11 PROCEDURE — 77080 DXA BONE DENSITY AXIAL: CPT | Mod: TC

## 2022-07-11 NOTE — TELEPHONE ENCOUNTER
Spoke with patient via telephone conversation. Instructed patient to continue current therapy and call PCP for guidance on reflux.     ----- Message from UMANG Corona sent at 7/8/2022  2:12 PM CDT -----  BP log reviewed.  Will continue current therapy for now.  Please instruct patient to call PCP regarding acid reflux medications.    Thanks.      ----- Message -----  From: Marcellus Morales MA  Sent: 7/8/2022  10:30 AM CDT  To: UMANG Corona, #    Pt's friend called with bp readings    7/4/22 - AM-160/89  Pm 140/82    7/5/22 -139/79  Pm 148/83    7/6/22 - 137/76  Pm - 139/84    7/7/22 - 142/80  Pm 136/78    Pt would like to know what medication would be good to take with his heart meds. He is having a lot of acid reflux. Please call pt.    Veronica (Family Friend, caretaker) [phone]

## 2022-07-13 ENCOUNTER — PATIENT OUTREACH (OUTPATIENT)
Dept: ADMINISTRATIVE | Facility: HOSPITAL | Age: 57
End: 2022-07-13
Payer: MEDICAID

## 2022-07-13 NOTE — PROGRESS NOTES
Population Health. Out Reach.  The following record(s)  below were uploaded for Health Maintenance .    12/29/16 COLONOSCOPY                                                           
.

## 2022-07-14 ENCOUNTER — TELEPHONE (OUTPATIENT)
Dept: HEMATOLOGY/ONCOLOGY | Facility: CLINIC | Age: 57
End: 2022-07-14
Payer: MEDICAID

## 2022-07-14 PROBLEM — C61: Status: ACTIVE | Noted: 2022-07-14

## 2022-07-15 ENCOUNTER — OFFICE VISIT (OUTPATIENT)
Dept: HEMATOLOGY/ONCOLOGY | Facility: CLINIC | Age: 57
End: 2022-07-15
Payer: MEDICAID

## 2022-07-15 ENCOUNTER — TELEPHONE (OUTPATIENT)
Dept: HEMATOLOGY/ONCOLOGY | Facility: CLINIC | Age: 57
End: 2022-07-15

## 2022-07-15 ENCOUNTER — TELEPHONE (OUTPATIENT)
Dept: HEMATOLOGY/ONCOLOGY | Facility: CLINIC | Age: 57
End: 2022-07-15
Payer: MEDICAID

## 2022-07-15 VITALS
HEART RATE: 92 BPM | OXYGEN SATURATION: 100 % | TEMPERATURE: 98 F | RESPIRATION RATE: 20 BRPM | SYSTOLIC BLOOD PRESSURE: 136 MMHG | HEIGHT: 68 IN | DIASTOLIC BLOOD PRESSURE: 89 MMHG | BODY MASS INDEX: 30.58 KG/M2 | WEIGHT: 201.75 LBS

## 2022-07-15 DIAGNOSIS — C61 ADENOCARCINOMA OF PROSTATE, STAGE 3: ICD-10-CM

## 2022-07-15 DIAGNOSIS — C61 ADENOCARCINOMA OF PROSTATE, STAGE 3: Primary | ICD-10-CM

## 2022-07-15 DIAGNOSIS — C61 PROSTATE CANCER: ICD-10-CM

## 2022-07-15 LAB
ALBUMIN SERPL-MCNC: 3.8 GM/DL (ref 3.5–5)
ALBUMIN/GLOB SERPL: 1.1 RATIO (ref 1.1–2)
ALP SERPL-CCNC: 114 UNIT/L (ref 40–150)
ALT SERPL-CCNC: 20 UNIT/L (ref 0–55)
AST SERPL-CCNC: 15 UNIT/L (ref 5–34)
BASOPHILS # BLD AUTO: 0.03 X10(3)/MCL (ref 0–0.2)
BASOPHILS NFR BLD AUTO: 0.7 %
BILIRUBIN DIRECT+TOT PNL SERPL-MCNC: 0.7 MG/DL
BUN SERPL-MCNC: 12.2 MG/DL (ref 8.4–25.7)
CALCIUM SERPL-MCNC: 9.8 MG/DL (ref 8.4–10.2)
CHLORIDE SERPL-SCNC: 108 MMOL/L (ref 98–107)
CO2 SERPL-SCNC: 25 MMOL/L (ref 22–29)
CREAT SERPL-MCNC: 1.16 MG/DL (ref 0.73–1.18)
EOSINOPHIL # BLD AUTO: 0.15 X10(3)/MCL (ref 0–0.9)
EOSINOPHIL NFR BLD AUTO: 3.4 %
ERYTHROCYTE [DISTWIDTH] IN BLOOD BY AUTOMATED COUNT: 15.2 % (ref 11.5–17)
GLOBULIN SER-MCNC: 3.5 GM/DL (ref 2.4–3.5)
GLUCOSE SERPL-MCNC: 118 MG/DL (ref 74–100)
HCT VFR BLD AUTO: 37.5 % (ref 42–52)
HGB BLD-MCNC: 12.3 GM/DL (ref 14–18)
IMM GRANULOCYTES # BLD AUTO: 0.02 X10(3)/MCL (ref 0–0.04)
IMM GRANULOCYTES NFR BLD AUTO: 0.5 %
LDH SERPL-CCNC: 215 U/L (ref 125–220)
LYMPHOCYTES # BLD AUTO: 1.66 X10(3)/MCL (ref 0.6–4.6)
LYMPHOCYTES NFR BLD AUTO: 38.2 %
MCH RBC QN AUTO: 25.4 PG (ref 27–31)
MCHC RBC AUTO-ENTMCNC: 32.8 MG/DL (ref 33–36)
MCV RBC AUTO: 77.3 FL (ref 80–94)
MONOCYTES # BLD AUTO: 0.29 X10(3)/MCL (ref 0.1–1.3)
MONOCYTES NFR BLD AUTO: 6.7 %
NEUTROPHILS # BLD AUTO: 2.2 X10(3)/MCL (ref 2.1–9.2)
NEUTROPHILS NFR BLD AUTO: 50.5 %
NRBC BLD AUTO-RTO: 0 %
PLATELET # BLD AUTO: 164 X10(3)/MCL (ref 130–400)
PMV BLD AUTO: 10.3 FL (ref 7.4–10.4)
POTASSIUM SERPL-SCNC: 4.4 MMOL/L (ref 3.5–5.1)
PROT SERPL-MCNC: 7.3 GM/DL (ref 6.4–8.3)
RBC # BLD AUTO: 4.85 X10(6)/MCL (ref 4.7–6.1)
SODIUM SERPL-SCNC: 140 MMOL/L (ref 136–145)
URATE SERPL-MCNC: 6.4 MG/DL (ref 3.5–7.2)
WBC # SPEC AUTO: 4.4 X10(3)/MCL (ref 4.5–11.5)

## 2022-07-15 PROCEDURE — 3008F PR BODY MASS INDEX (BMI) DOCUMENTED: ICD-10-PCS | Mod: CPTII,,, | Performed by: INTERNAL MEDICINE

## 2022-07-15 PROCEDURE — 3075F PR MOST RECENT SYSTOLIC BLOOD PRESS GE 130-139MM HG: ICD-10-PCS | Mod: CPTII,,, | Performed by: INTERNAL MEDICINE

## 2022-07-15 PROCEDURE — 36415 COLL VENOUS BLD VENIPUNCTURE: CPT

## 2022-07-15 PROCEDURE — 1159F MED LIST DOCD IN RCRD: CPT | Mod: CPTII,,, | Performed by: INTERNAL MEDICINE

## 2022-07-15 PROCEDURE — 3079F DIAST BP 80-89 MM HG: CPT | Mod: CPTII,,, | Performed by: INTERNAL MEDICINE

## 2022-07-15 PROCEDURE — 1160F RVW MEDS BY RX/DR IN RCRD: CPT | Mod: CPTII,,, | Performed by: INTERNAL MEDICINE

## 2022-07-15 PROCEDURE — 99214 OFFICE O/P EST MOD 30 MIN: CPT | Mod: PBBFAC | Performed by: INTERNAL MEDICINE

## 2022-07-15 PROCEDURE — 1159F PR MEDICATION LIST DOCUMENTED IN MEDICAL RECORD: ICD-10-PCS | Mod: CPTII,,, | Performed by: INTERNAL MEDICINE

## 2022-07-15 PROCEDURE — 99214 PR OFFICE/OUTPT VISIT, EST, LEVL IV, 30-39 MIN: ICD-10-PCS | Mod: S$PBB,,, | Performed by: INTERNAL MEDICINE

## 2022-07-15 PROCEDURE — 3079F PR MOST RECENT DIASTOLIC BLOOD PRESSURE 80-89 MM HG: ICD-10-PCS | Mod: CPTII,,, | Performed by: INTERNAL MEDICINE

## 2022-07-15 PROCEDURE — 99214 OFFICE O/P EST MOD 30 MIN: CPT | Mod: S$PBB,,, | Performed by: INTERNAL MEDICINE

## 2022-07-15 PROCEDURE — 3075F SYST BP GE 130 - 139MM HG: CPT | Mod: CPTII,,, | Performed by: INTERNAL MEDICINE

## 2022-07-15 PROCEDURE — 3008F BODY MASS INDEX DOCD: CPT | Mod: CPTII,,, | Performed by: INTERNAL MEDICINE

## 2022-07-15 PROCEDURE — 1160F PR REVIEW ALL MEDS BY PRESCRIBER/CLIN PHARMACIST DOCUMENTED: ICD-10-PCS | Mod: CPTII,,, | Performed by: INTERNAL MEDICINE

## 2022-07-15 NOTE — TELEPHONE ENCOUNTER
Please find out plans of prostate biopsy at [facility]  Need clinical notes from [facility]

## 2022-07-15 NOTE — PROGRESS NOTES
Past medical history: Adenocarcinoma prostate.  Hypertension.  Obesity.  Procedures/surgical history: EGD and colonoscopy (12/29/2016).  Ultrasound-guided prostate biopsy (09/29/2020).  Social history: Single.  Lives in [place], [place].  Does not work.  Has been smoking a pack of cigarettes daily for 28 years; for last 2 weeks, down to 4 cigarettes daily.  Has been drinking 12 packs of beer daily for 28 years; discontinued 1 month ago.  Used crack cocaine for 25 years; quit 12 years ago.  Family history: Father and paternal uncle experienced cancers (he does not know the details).  Health maintenance:   -EGD and colonoscopy (12/29/2016) (epigastric pain, hematochezia): Distal esophagitis, gastritis, hiatal hernia, very poor prep, and anal fissure (pathology: Esophageal biopsy: Active esophagitis with reactive epithelial atypia, no dysplasia; body of the stomach, biopsy: Mild chronic gastritis, no dysplasia)   -03/29/2017: EGD (history of esophagitis and gastritis): Esophagitis and gastritis improved      Reason for follow-up:   -Adenocarcinoma of prostate, AJCC prognostic stage IIIB, risk group very high      History of present illness:   55-year-old gentleman referred by Dr. Eriberto Sanon, with adenocarcinoma of prostate     He started with symptoms of abdominal bloating.  Sought care with his PCP.  PSA was drawn, 117.  Subsequently, referred for urology work-up.  Imaging studies as below.  He denied weight loss, bone pain, dysuria, or hematuria.  Reported nocturia x4.   -MRI prostate: Volume 78 cc; concerning lesion measuring involving the medial and lateral segments of both peripheral zones from base to the apex; 1-2 mm extracapsular extension was suspected at the level of the right peripheral zone mid gland and invasion of the seminal vesicle was suspected with right worse than left; lymph nodes were prominent but not large enough to satisfy MRI criteria for metastasis   -Bone scan: No metastatic  disease     09/10/2020: MRI pelvis with and without contrast:   -Prostate volume 78 cc   -Lesion 1: 36 mm x 26 mm x 12 mm coalescent lesion involving the medial and lateral segments of both peripheral zones from the base to the apex, highly concerning for malignancy; 1-2 mm extracapsular extension suspected at the level of the right peripheral zone and mid gland; invasion of the seminal vesicle strongly suspected, right worse than left   -Prominent lymph nodes are present in the pelvis but do not satisfy MR criteria for lymphadenopathy     09/10/2020: Whole-body nuclear medicine bone scan:   -No bone metastasis     09/29/2020: Ultrasound-guided prostate biopsy:   -12/12 cores positive for adenocarcinoma of prostate; Lupe grade 4+ 3 = score 7; perineural invasion identified; 70% - 80% needle core tissue involved     EBRT: 10/19/2020-12/01/2020 07/19/2021: Dr. Luiz Sanon:   -Presenting    -Had hematuria which resolved   -Not due for Eligard 20 October; continue Casodex   -PSA level dropping with ADT, 5.09   -Continue Casodex; Lupron started   -MRI prostate, bone scan: Likely T3 disease at a minimum; suspicious lymph nodes although do not meet criteria   -Completed EBRT   -Follow-up in 3 months with PSA     09/01/2021: CT A/P with and without contrast:   -No metastasis     09/01/2021: Whole-body nuclear medicine bone scan (comparison: Bone scan 09/10/2020; CT A/P 09/01/2021):   -No bone metastasis     PSA level:   -117 (08/31/2020); 5.09 (01/07/2021); 4.03 (04/12/2021)     09/14/2020: Lupron 22.5 mg IM   10/12/2020: Triptorelin 22.5 mg IM   04/12/2021: Triptorelin 22.5 mg IM    09/02/2021:   Presents for initial medical oncology consultation.  Pleasant gentleman.  In no acute discomfort.   -For symptoms of prostatism, especially nocturia, has been taking Flomax for last 1 year, with good relief of symptoms.  Currently, no frequency of micturition, no hesitancy, no dribbling, no dysuria or hematuria,  and no nocturia.  No pelvic pain.  No weakness, fatigue, malaise, anorexia, unintentional weight loss, any lumps or lymphadenopathy, chest pain, cough, dyspnea, abdominal pain, nausea, vomiting, etc.   -Reports some weakness with ongoing androgen deprivation therapy.  Some night sweats.  Some heartburn.  No GI bleeding.  Compliant with Casodex.      Interval history:     03/16/2022:   -Follows up with cardiology: CAD, nonobstructive per coronary angiogram; NSTEMI 10/2021; LVEF 50-55% on TTE (10/01/2021), etc.   -02/14/2022: Urology follow-up: Patient not getting regular Lupron shots secondary to being late; Lupron 45 mg IM (every 6 month Trelstar was due in October 2021 but apparently, patient did not present for the shot); referred to [facility] for salvage prostatectomy (TRUS for tissue diagnosis deferred to [facility])   -02/14/2022: Lupron 45 mg IM  No showed 2/28/2022   Presents for follow visit.  He was referred by referred by [facility] urology to [facility] for prostatectomy.  He tells me that nobody from [facility] has contacted him.  Denies any symptoms.  Mild dyspnea with physical activity.  No LUTS.  No hematuria.  No weakness or fatigue.    05/16/2022:  Presents for follow-up visit.  Doing well.  Says that he went to [facility] last week; apparently, they are planning prostate biopsy sometime in the next 2 weeks.  He is doing well.  Denies any symptoms.  No dysuria, frequency of micturition, hematuria, hesitancy, urgency, etc..  No weakness, fatigue, malaise, unusual headaches, bone pains, chest pain, cough, dyspnea, weakness, fatigue, etc..    07/15/2022:  -06/17/2022:  LVEF 60%, moderate MR, mild TR, moderate left atrial enlargement  -07/11/2022:  DEXA scan (comparison:  01/12/2022):  Normal BMD  -PSA level:  1.5 (05/16/2022) (testosterone level 13.08, castrate); 5.97 (01/07/2022); 2.55 (09/02/2021); 4.03 (04/12/2021), etc.  Presents for a follow-up visit.  Doing well.  Some hot flashes secondary to ADT.  Denies  dysuria, frequency of micturition, or hematuria.  Says that [facility] have decided against doing prostate biopsy at this time because his PSA level dropped.  Apparently, he has repeat appointment with them in September.  His next Lupron shot should be around 08/14/2022 (q.6 months) (he gets Lupron shots with Urology).      Review of systems:   All systems reviewed, and found to be negative except for the symptoms detailed above.      Physical examination:   VITAL SIGNS:  Reviewed.      GENERAL:  In no apparent distress.    HEAD:  No signs of head trauma.   EYES:  Pupils are equal.  Extraocular motions intact.    EARS:  Hearing grossly intact.   MOUTH:  Oropharynx is normal.   NECK:  No adenopathy, no JVD.      CHEST:  Chest with clear breath sounds bilaterally.  No wheezes, rales, or rhonchi.    CARDIAC:  Regular rate and rhythm.  S1 and S2, without murmurs, gallops, or rubs.   VASCULAR:  No Edema.  Peripheral pulses normal and equal in all extremities.   ABDOMEN:  Soft, without detectable tenderness.  No sign of distention.  No   rebound or guarding, and no masses palpated.   Bowel Sounds normal.   MUSCULOSKELETAL:  Good range of motion of all major joints. Extremities without clubbing, cyanosis or edema.    NEUROLOGIC EXAM:  Alert and oriented x 3.  No focal sensory or strength deficits.   Speech normal.  Follows commands.   PSYCHIATRIC:  Mood normal.   SKIN:  No rash or lesions.      Assessment:   #Adenocarcinoma of prostate:   - (08/31/2020)   -MRI pelvis (09/10/2020): 36 mm lesion; 1-2 mm extracapsular extension; invasion of seminal vesicle; prominent lymph nodes but not satisfying MRI criteria for lymphadenopathy   -Bone scan (09/10/2020): No bone metastasis   -Ultrasound-guided prostate biopsy (09/29/2020): 12/12 cores positive for adenocarcinoma prostate; Lupe grade 4+ 3 = score 7; perineural invasion identified; 70% - 80% needle core tissue involved   >>   -On MRI, tumor invades seminal  vesicle, therefore, cT3b   -Lymph nodes negative   -No distant metastasis   -Presenting    -Lupe pattern 4+3 = score 7; therefore, grade group 3   >>   Therefore, AJCC prognostic stage IIIB   cT3b disease; therefore, risk group very high   >>  Plan: EBRT + ADT (x1.5-3 years; our plan, 3 years)   -S/p EBRT (10/19/2020-12/10/2020)   -ADT (to continue for 3 years, i.e., 10/2020-10/2023):   09/14/2020: Lupron 22.5 mg IM    10/12/2020: Triptorelin 22.5 mg IM   04/12/2021: Triptorelin 22.5 mg IM   -No metastasis on bone scan and CT A/P (09/01/2021)   >>>  Post RT PSA recurrence/recurrence in prostate/no distant metastasis:   -12/23/2021: PSA level 7.21 (was 2.55 on 09/02/2021   (12/23/2021: PSA level rising; his Trelstar shot was due in October 2021 in his urologist's office which he never got)   -No metastasis on bone scan and CT C/A/P (01/04/2022)   -Follows up with cardiology: CAD, nonobstructive per coronary angiogram; NSTEMI 10/2021; LVEF 50-55% on TTE (10/01/2021), etc.   -01/11/2022: PSMA PET/CT: 2 foci of abnormal uptake in the prostate concerning for residual/recurrent tumor   (periphery of the right hemisphere of the prostate at the level of midline maximum SUV 7.9, 1.1 x 0.7 cm; also, left hemisphere of prostate at the level of mid gland maximum SUV 8.3, 1.2 x 1.0 cm); no convincing PET evidence of metastatic disease   -01/12/2022: DEXA scan: Normal BMD   -01/07/2022: PSA 5.97 (was 7.21 on 12/23/2021) (testosterone level 200 on 01/07/2022, not castrate, because he missed Trelstar shot which was due in October 2021)   -02/14/2022: Urology follow-up:  Patient did not get ADT shot which was due in October 2021 due to being late (this explains testosterone level of 200 on 01/07/2022);   -02/14/2022: Lupron 45 mg IM, administered;   Rerred to [facility] for salvage prostatectomy (TRUS for tissue diagnosis deferred to [facility])   -02/14/2022: Lupron 45 mg IM administered  -07/11/2022:  DEXA scan (comparison:   01/12/2022):  Normal BMD  -PSA level:  1.5 (05/16/2022) (testosterone level 13.08, castrate); 5.97 (01/07/2022); 2.55 (09/02/2021); 4.03 (04/12/2021), etc.       #ADT:   (Continue for 3 years, i.e., 10/2020-10/2023)   09/14/2020: Lupron 22.5 mg IM   10/12/2020: Triptorelin 22.5 mg IM   04/12/2021: Triptorelin 22.5 mg IM   -He missed his Trelstar shot which was due in October' 21   -01/07/2022: PSA 5.97 (was 7.21 on 12/23/2021); testosterone level 200.66 (not castrate)  -02/14/2022: Lupron 45 mg IM administered       #PSA level:  117 (08/31/2020); 5.09 (01/07/2021);   4.03 (04/12/2021);   2.55 (09/02/2021);   7.21 (12/23/2021) (rising because he missed Trelstar shot which was due October' 21);   5.97 (01/07/2022; testosterone level 200.66, not castrate because he missed Trelstar shot which was due 10/2021)  (02/14/2022: Lupron 45 mg IM administered)  1.5 (05/16/2022) (testosterone level 13.08, castrate)      Plan:  Please find out plans of prostate biopsy at [facility]  Need clinical notes from [facility]        -We referred him back to urology for TRUS biopsy of prostate   -Options at this time:   1. If TRUS biopsy positive, and no distant metastasis, then: Observation; or RP + PLND; or brachytherapy; or cryotherapy; or high intensity focused ultrasound (HIFU), etc.;   2.  If TRUS biopsy negative, and no distant metastasis, then, observation; or ADT   >>  -02/14/2022: Lupron 45 mg IM   -Urology (01/24/2022): Referred the patient to [facility] for salvage prostatectomy  >>Plan: After prostatectomy, monitor   -If progression after prostatectomy, then, systemic therapy for castration naïve disease versus systemic therapy for M0 CRPC versus systemic therapy for M1 CRPC  >>>   -next Lupron shot should be around 08/14/2022 (ER/shots with urology)  -05/16/2022:  Tells me that [facility] urology are planning prostate biopsy  -07/15/2022:  Tells me that [facility] have decided against possible biopsy at this time because his PSA  level dropped; repeat appointment in September     Post RT monitoring:   -Monitor PSA level every 3 months for 5 years (12/2020-12/2025); subsequently, every year  -PSA level down to 1.5 on 05/16/2021 (testosterone level castrate, because he received Lupron shot on 02/14/2022)  >>>  -repeat PSA level every 3 months; next, mid August     -To prevent/minimize bone demineralization with ADT, recommend calcium and vitamin D supplements   -Calcium (7526-5204 mg daily from food and supplements) and vitamin D3 (400-1000 units daily)    -normal BMD on DEXA scan 07/11/2022  >>>  -repeat DEXA scan in 2 years (07/2024)     -Continue Flomax daily; currently, no irritative or obstructive LUTS     -Germline testing is recommended; ordered   -Molecular/biomarkers analysis of tumor is not routinely recommended     Follow-up in 1 month with PSA, testosterone level, CBC, CMP, and records from [facility]     Above discussed with him.  All questions answered.  Discussed labs and scans and gave him copies of relevant reports.   He understands and agrees this plan.

## 2022-07-15 NOTE — TELEPHONE ENCOUNTER
Orders for today:    Find out plans of prostate biopsy at [facility] (awaiting records)    Repeat PSA and testosterone levels next month (middle of August)    DEXA scan in July 2024    Genetic testing (has already been ordered)    Follow-up in 1 month.

## 2022-07-15 NOTE — TELEPHONE ENCOUNTER
Follow-up in 1 month with PSA, testosterone level, CBC, CMP, and records from [place] [facility]

## 2022-08-08 ENCOUNTER — OFFICE VISIT (OUTPATIENT)
Dept: HEMATOLOGY/ONCOLOGY | Facility: CLINIC | Age: 57
End: 2022-08-08
Payer: MEDICAID

## 2022-08-08 DIAGNOSIS — C61 PROSTATE CANCER: ICD-10-CM

## 2022-08-08 DIAGNOSIS — C61 ADENOCARCINOMA OF PROSTATE, STAGE 3: Primary | ICD-10-CM

## 2022-08-08 PROCEDURE — 99213 OFFICE O/P EST LOW 20 MIN: CPT | Mod: 95,,, | Performed by: NURSE PRACTITIONER

## 2022-08-08 PROCEDURE — 99213 PR OFFICE/OUTPT VISIT, EST, LEVL III, 20-29 MIN: ICD-10-PCS | Mod: 95,,, | Performed by: NURSE PRACTITIONER

## 2022-08-08 NOTE — PROGRESS NOTES
REFERRING PHYSICIAN: Dr. Omkar Katz    Subjective:       Patient ID: Burton Vasquez is a 56 y.o. male.    Chief Complaint: Personal history of prostate cancer ([place] 4+3=7) and a family history of cancer. Dr. Katz has requested genetic testing. Patient presented for genetic counseling on 1/3/2022 and was found appropriate for genetic testing. He signed consent, lab was drawn and sent to [facility] for BRCA1/2 Analysis with CancerNext+RNAinsight panel testing. He presented via Telemedicine Visit (audio only) today for results disclosure.     HPI  Past Medical History:   Diagnosis Date    Hypertension     Prostate cancer     Stroke       Review of patient's allergies indicates:  No Known Allergies     Review of Systems          Problem List Items Addressed This Visit    None       Oncology History   Adenocarcinoma of prostate, stage 3   9/29/2020 Cancer Staged    Staging form: Prostate, AJCC 8th Edition  - Clinical stage from 9/29/2020: Stage IIIB (cT3b, cN0, cM0, PSA: 117, Grade Group: 3)     7/14/2022 Initial Diagnosis    Adenocarcinoma of prostate, stage 3              Family History   Problem Relation Age of Onset    Bone cancer Father        Assessment:   Genetic testing was appropriate for this patient because of his personal and family history. This comprehensive [facility] [employer] [facility] [employer] [facility] [employer] CancerNext+RNAinsight analysis indicated that there was no deleterious mutation and no variants of uncertain significance found in 36 genes with known associated to increased cancer risk: APC, NIKKI AXIN2, BARD1, BRCA1, BRCA2, BMPR1A, BRIP1, CDH1, CDK4, CDKN2A, CHEK2, DICER1, HOXB13, EPCAM, GREM1, MLH1, MSH2, MSH6, MUTYH, NBN, NF12, PALB2, PMS2, POLD1, POLE, PTEN, RAD50, RECQL, RAD51C, RAD51D, SM4D4, SMARCA4, STK11, TP53.    It was explained to the patient, that, although this analysis indicated a negative result, there are other, rare genetic abnormalities that this test will not detect. This result, however, rules out  the majority of abnormalities believed to be responsible for hereditary susceptibility to cancer.    A copy of his result will be emailed to [email or web address]     The patient location is: home    Visit type: audio only    Time with patient: 10 minutes  20 minutes minutes of total time spent on the encounter, which includes face to face time and non-face to face time preparing to see the patient (eg, review of tests), Obtaining and/or reviewing separately obtained history, Documenting clinical information in the electronic or other health record, Independently interpreting results (not separately reported) and communicating results to the patient/family/caregiver, or Care coordination (not separately reported).         Each patient to whom he or she provides medical services by telemedicine is:  (1) informed of the relationship between the physician and patient and the respective role of any other health care provider with respect to management of the patient; and (2) notified that he or she may decline to receive medical services by telemedicine and may withdraw from such care at any time.    NATALIA MANCIA, PhD

## 2022-08-13 ENCOUNTER — TELEPHONE (OUTPATIENT)
Dept: HEMATOLOGY/ONCOLOGY | Facility: CLINIC | Age: 57
End: 2022-08-13
Payer: MEDICAID

## 2022-08-13 NOTE — PROGRESS NOTES
Past medical history: Adenocarcinoma prostate.  Hypertension.  Obesity.  Procedures/surgical history: EGD and colonoscopy (12/29/2016).  Ultrasound-guided prostate biopsy (09/29/2020).  Social history: Single.  Lives in [place], [place].  Does not work.  Has been smoking a pack of cigarettes daily for 28 years; for last 2 weeks, down to 4 cigarettes daily.  Has been drinking 12 packs of beer daily for 28 years; discontinued 1 month ago.  Used crack cocaine for 25 years; quit 12 years ago.  Family history: Father and paternal uncle experienced cancers (he does not know the details).  Health maintenance:   -EGD and colonoscopy (12/29/2016) (epigastric pain, hematochezia): Distal esophagitis, gastritis, hiatal hernia, very poor prep, and anal fissure (pathology: Esophageal biopsy: Active esophagitis with reactive epithelial atypia, no dysplasia; body of the stomach, biopsy: Mild chronic gastritis, no dysplasia)   -03/29/2017: EGD (history of esophagitis and gastritis): Esophagitis and gastritis improved      Reason for follow-up:   -Adenocarcinoma of prostate, AJCC prognostic stage IIIB, risk group very high      History of present illness:   55-year-old gentleman referred by Dr. Eriberto Sanon, with adenocarcinoma of prostate     #Adenocarcinoma of prostate:   - (08/31/2020)   -MRI pelvis (09/10/2020): 36 mm lesion; 1-2 mm extracapsular extension; invasion of seminal vesicle; prominent lymph nodes but not satisfying MRI criteria for lymphadenopathy   -Bone scan (09/10/2020): No bone metastasis   -Ultrasound-guided prostate biopsy (09/29/2020): 12/12 cores positive for adenocarcinoma prostate; [place] grade 4+ 3 = score 7; perineural invasion identified; 70% - 80% needle core tissue involved   >>   -On MRI, tumor invades seminal vesicle, therefore, cT3b   -Lymph nodes negative   -No distant metastasis   -Presenting    -Lu[place] pattern 4+3 = score 7; therefore, grade group 3   >>   Therefore, AJCC  prognostic stage IIIB   cT3b disease; therefore, risk group very high   >>  Plan: EBRT + ADT (x1.5-3 years; our plan, 3 years)   -S/p EBRT (10/19/2020-12/10/2020)   -ADT (to continue for 3 years, i.e., 10/2020-10/2023):   09/14/2020: Lupron 22.5 mg IM    10/12/2020: Triptorelin 22.5 mg IM   04/12/2021: Triptorelin 22.5 mg IM   -No metastasis on bone scan and CT A/P (09/01/2021)   >>>  Post RT PSA recurrence/recurrence in prostate/no distant metastasis:   -12/23/2021: PSA level 7.21 (was 2.55 on 09/02/2021   (12/23/2021: PSA level rising; his Trelstar shot was due in October 2021 in his urologist's office which he never got)   -No metastasis on bone scan and CT C/A/P (01/04/2022)   -Follows up with cardiology: CAD, nonobstructive per coronary angiogram; NSTEMI 10/2021; LVEF 50-55% on TTE (10/01/2021), etc.   -01/11/2022: PSMA PET/CT: 2 foci of abnormal uptake in the prostate concerning for residual/recurrent tumor   (periphery of the right hemisphere of the prostate at the level of midline maximum SUV 7.9, 1.1 x 0.7 cm; also, left hemisphere of prostate at the level of mid gland maximum SUV 8.3, 1.2 x 1.0 cm); no convincing PET evidence of metastatic disease   -01/12/2022: DEXA scan: Normal BMD   -01/07/2022: PSA 5.97 (was 7.21 on 12/23/2021) (testosterone level 200 on 01/07/2022, not castrate, because he missed Trelstar shot which was due in October 2021)   -02/14/2022: Urology follow-up:  Patient did not get ADT shot which was due in October 2021 due to being late (this explains testosterone level of 200 on 01/07/2022);   -02/14/2022: Lupron 45 mg IM, administered;   Rerred to [facility] for salvage prostatectomy (TRUS for tissue diagnosis deferred to [facility])   -02/14/2022: Lupron 45 mg IM administered  -07/11/2022:  DEXA scan (comparison:  01/12/2022):  Normal BMD  -PSA level:  1.5 (05/16/2022) (testosterone level 13.08, castrate); 5.97 (01/07/2022); 2.55 (09/02/2021); 4.03 (04/12/2021), etc.  -genetic testing  (01/04/2022) negative for deleterious mutation and variants of uncertain significance found in 36 genes with known association to increased cancer risk      09/02/2021:   Presents for initial medical oncology consultation.     Interval history:    07/15/2022:  -06/17/2022:  LVEF 60%, moderate MR, mild TR, moderate left atrial enlargement  -07/11/2022:  DEXA scan (comparison:  01/12/2022):  Normal BMD  -PSA level:  1.5 (05/16/2022) (testosterone level 13.08, castrate); 5.97 (01/07/2022); 2.55 (09/02/2021); 4.03 (04/12/2021), etc.  Presents for a follow-up visit.  Doing well.  Some hot flashes secondary to ADT.  Denies dysuria, frequency of micturition, or hematuria.  Says that [facility] have decided against doing prostate biopsy at this time because his PSA level dropped.  Apparently, he has repeat appointment with them in September.  His next Lupron shot should be around 08/14/2022 (q.6 months) (he gets Lupron shots with Urology).    08/15/2022:  -genetic testing (01/04/2022) negative for deleterious mutation and variants of uncertain significance found in 36 genes with known association to increased cancer risk   Presents for follow-up visit.  Says that his appointment with [facility] is on 08/22/2022; likely, he will get Lupron shot also on that visit.  No irritative or obstructive LUTS.  Urine stream is normal.  No dysuria, frequency of micturition, or hematuria.  Complains of generalized weakness, some dyspnea, swelling in the legs.  Standing for long periods of time makes him weak.  No neurological symptoms.  No chest pains.  Fair appetite.  ECOG 1.       Review of systems:   All systems reviewed, and found to be negative except for the symptoms detailed above.      Physical examination:   VITAL SIGNS:  Reviewed.      GENERAL:  In no apparent distress.    HEAD:  No signs of head trauma.   EYES:  Pupils are equal.  Extraocular motions intact.    EARS:  Hearing grossly intact.   MOUTH:  Oropharynx is  normal.   NECK:  No adenopathy, no JVD.      CHEST:  Chest with clear breath sounds bilaterally.  No wheezes, rales, or rhonchi.    CARDIAC:  Regular rate and rhythm.  S1 and S2, without murmurs, gallops, or rubs.   VASCULAR:  No Edema.  Peripheral pulses normal and equal in all extremities.   ABDOMEN:  Soft, without detectable tenderness.  No sign of distention.  No   rebound or guarding, and no masses palpated.   Bowel Sounds normal.   MUSCULOSKELETAL:  Good range of motion of all major joints. Extremities without clubbing, cyanosis or edema.    NEUROLOGIC EXAM:  Alert and oriented x 3.  No focal sensory or strength deficits.   Speech normal.  Follows commands.   PSYCHIATRIC:  Mood normal.   SKIN:  No rash or lesions.      Assessment:   #Adenocarcinoma of prostate:  To summarize:  -adenocarcinoma of prostate  -presentation: 08/2020   -On MRI, tumor invades seminal vesicle, therefore, cT3b   -Lymph nodes negative on MRI   -No distant metastasis new on bone scan   -Presenting  (08/31/2020)   -[place] pattern 4+3 = score 7; therefore, grade group 3   >>  Therefore, AJCC prognostic stage IIIB  cT3b disease; therefore, risk group very high  >>  Plan:  EBRT +ADT (1.5-3 years)  -S/P EBRT 10/19/2020-12/10/2020  -ADT to continue for 3 years  -Lupron started 09/14/2020  >>>  Post RT PSA recurrence/recurrence in prostate/no distant metastases:  -missed Trelstar shot which was due 10/2021 with his urologist  -rise in PSA  -PSMA PET-CT 01/11/2022:  2 foci of residual/recurrent tumor in prostate  -referred to [facility] for TRUS/salvage prostatectomy  -after Lupron 02/14/2022, PSA level dropped to 1.5 (05/16/2022)   -genetic testing negative (01/04/2022)  -since PSA level dropped after Lupron 02/14/2022, therefore, according to the patient, [facility] decided against prostate biopsy       #ADT:   (Continue for 3 years, i.e., 10/2020-10/2023)   09/14/2020: Lupron 22.5 mg IM   10/12/2020: Triptorelin 22.5 mg IM   04/12/2021:  Triptorelin 22.5 mg IM   -He missed his Trelstar shot which was due in October' 21   -01/07/2022: PSA 5.97 (was 7.21 on 12/23/2021); testosterone level 200.66 (not castrate)  -02/14/2022: Lupron 45 mg IM        #PSA level:  117 (08/31/2020); 5.09 (01/07/2021);   4.03 (04/12/2021);   2.55 (09/02/2021);   7.21 (12/23/2021) (rising because he missed Trelstar shot which was due October' 21);   5.97 (01/07/2022; testosterone level 200.66, not castrate because he missed Trelstar shot which was due 10/2021)  (02/14/2022: Lupron 45 mg IM administered)  1.5 (05/16/2022) (testosterone level 13.08, castrate)      Plan:  -We referred him back to urology for TRUS biopsy of prostate in view of rising PSA level after he missed Lupron shot in October 2021  -however, after Lupron 45 mg IM on 02/14/2022, his PSA level dropped  -[facility] (01/24/2022): Referred the patient to [facility] for salvage prostatectomy  -according to patient, [facility] decided not to perform prostate biopsy because PSA level was dropping with ADT  >>>   -continue ADT with Lupron shots (ADT to continue until 12/2023) (last shot of Lupron, administered every 6 months, should be in 06/2023  -next Lupron shot should be around 08/14/2022 (receives shots with Urology)  -05/16/2022:  Tells me that [facility] urology are planning prostate biopsy    -genetic testing negative  -Molecular/biomarkers analysis of tumor is not routinely recommended      Post RT monitoring:   -Monitor PSA level every 3 months for 5 years (12/2020-12/2025); subsequently, every year  -PSA level down to 1.5 on 05/16/2021 (testosterone level castrate, because he received Lupron shot on 02/14/2022)  >>>  -repeat PSA level every 3 months; next, mid August     -To prevent/minimize bone demineralization with ADT, recommend calcium and vitamin D supplements   -Calcium (9695-3919 mg daily from food and supplements) and vitamin D3 (400-1000 units daily)    -normal BMD on DEXA scan 07/11/2022  >>>  -repeat  DEXA scan in 2 years (07/2024)    -Continue Flomax daily; currently, no irritative or obstructive LUTS    Follow-up in 3 months (November), with CBC, CMP, PSA, testosterone level.    Above discussed with him.  All questions answered.  Discussed labs and scans and gave him copies of relevant reports.  He understands and agrees this plan.

## 2022-08-13 NOTE — TELEPHONE ENCOUNTER
Need records from [facility].  Patient tells me that they are not planning prostate biopsy; however, I see no such notes in Care everywhere.

## 2022-08-15 ENCOUNTER — OFFICE VISIT (OUTPATIENT)
Dept: HEMATOLOGY/ONCOLOGY | Facility: CLINIC | Age: 57
End: 2022-08-15
Payer: MEDICAID

## 2022-08-15 ENCOUNTER — TELEPHONE (OUTPATIENT)
Dept: HEMATOLOGY/ONCOLOGY | Facility: CLINIC | Age: 57
End: 2022-08-15
Payer: MEDICAID

## 2022-08-15 ENCOUNTER — DOCUMENTATION ONLY (OUTPATIENT)
Dept: HEMATOLOGY/ONCOLOGY | Facility: CLINIC | Age: 57
End: 2022-08-15
Payer: MEDICAID

## 2022-08-15 VITALS
RESPIRATION RATE: 20 BRPM | BODY MASS INDEX: 31.07 KG/M2 | HEIGHT: 68 IN | WEIGHT: 205 LBS | HEART RATE: 102 BPM | OXYGEN SATURATION: 100 % | TEMPERATURE: 98 F | DIASTOLIC BLOOD PRESSURE: 81 MMHG | SYSTOLIC BLOOD PRESSURE: 132 MMHG

## 2022-08-15 DIAGNOSIS — C61 ADENOCARCINOMA OF PROSTATE, STAGE 3: Primary | ICD-10-CM

## 2022-08-15 PROCEDURE — 99214 OFFICE O/P EST MOD 30 MIN: CPT | Mod: S$PBB,,, | Performed by: INTERNAL MEDICINE

## 2022-08-15 PROCEDURE — 3008F PR BODY MASS INDEX (BMI) DOCUMENTED: ICD-10-PCS | Mod: CPTII,,, | Performed by: INTERNAL MEDICINE

## 2022-08-15 PROCEDURE — 3075F PR MOST RECENT SYSTOLIC BLOOD PRESS GE 130-139MM HG: ICD-10-PCS | Mod: CPTII,,, | Performed by: INTERNAL MEDICINE

## 2022-08-15 PROCEDURE — 1159F PR MEDICATION LIST DOCUMENTED IN MEDICAL RECORD: ICD-10-PCS | Mod: CPTII,,, | Performed by: INTERNAL MEDICINE

## 2022-08-15 PROCEDURE — 3079F DIAST BP 80-89 MM HG: CPT | Mod: CPTII,,, | Performed by: INTERNAL MEDICINE

## 2022-08-15 PROCEDURE — 3008F BODY MASS INDEX DOCD: CPT | Mod: CPTII,,, | Performed by: INTERNAL MEDICINE

## 2022-08-15 PROCEDURE — 99214 OFFICE O/P EST MOD 30 MIN: CPT | Mod: PBBFAC | Performed by: INTERNAL MEDICINE

## 2022-08-15 PROCEDURE — 3075F SYST BP GE 130 - 139MM HG: CPT | Mod: CPTII,,, | Performed by: INTERNAL MEDICINE

## 2022-08-15 PROCEDURE — 99214 PR OFFICE/OUTPT VISIT, EST, LEVL IV, 30-39 MIN: ICD-10-PCS | Mod: S$PBB,,, | Performed by: INTERNAL MEDICINE

## 2022-08-15 PROCEDURE — 1159F MED LIST DOCD IN RCRD: CPT | Mod: CPTII,,, | Performed by: INTERNAL MEDICINE

## 2022-08-15 PROCEDURE — 3079F PR MOST RECENT DIASTOLIC BLOOD PRESSURE 80-89 MM HG: ICD-10-PCS | Mod: CPTII,,, | Performed by: INTERNAL MEDICINE

## 2022-08-15 PROCEDURE — 1160F PR REVIEW ALL MEDS BY PRESCRIBER/CLIN PHARMACIST DOCUMENTED: ICD-10-PCS | Mod: CPTII,,, | Performed by: INTERNAL MEDICINE

## 2022-08-15 PROCEDURE — 1160F RVW MEDS BY RX/DR IN RCRD: CPT | Mod: CPTII,,, | Performed by: INTERNAL MEDICINE

## 2022-08-15 NOTE — TELEPHONE ENCOUNTER
Orders for today:     Need clinical notes from [facility]  Next Lupron shot 08/14/2022 (with Urology)  Check PSA level and testosterone level today, then every 3 months  DEXA scan July 2024    Follow-up in 3 months with me with PSA, testosterone level, CBC, and CMP.

## 2022-08-19 ENCOUNTER — LAB VISIT (OUTPATIENT)
Dept: LAB | Facility: HOSPITAL | Age: 57
End: 2022-08-19
Attending: UROLOGY
Payer: MEDICAID

## 2022-08-19 DIAGNOSIS — C61 ADENOCARCINOMA OF PROSTATE, STAGE 3: Primary | ICD-10-CM

## 2022-08-19 DIAGNOSIS — C61 ADENOCARCINOMA OF PROSTATE, STAGE 3: ICD-10-CM

## 2022-08-19 LAB
FREE/TOTAL PSA (OHS): 14.3 %
PSA FREE MFR SERPL: 14 %
PSA FREE SERPL-MCNC: 0.12 NG/ML
PSA SERPL-MCNC: 0.84 NG/ML

## 2022-08-19 PROCEDURE — 84153 ASSAY OF PSA TOTAL: CPT

## 2022-08-19 PROCEDURE — 84154 ASSAY OF PSA FREE: CPT

## 2022-08-19 PROCEDURE — 36415 COLL VENOUS BLD VENIPUNCTURE: CPT

## 2022-08-22 ENCOUNTER — OFFICE VISIT (OUTPATIENT)
Dept: UROLOGY | Facility: CLINIC | Age: 57
End: 2022-08-22
Payer: MEDICAID

## 2022-08-22 VITALS
WEIGHT: 205 LBS | OXYGEN SATURATION: 98 % | SYSTOLIC BLOOD PRESSURE: 150 MMHG | RESPIRATION RATE: 20 BRPM | DIASTOLIC BLOOD PRESSURE: 88 MMHG | BODY MASS INDEX: 31.07 KG/M2 | TEMPERATURE: 98 F | HEIGHT: 68 IN | HEART RATE: 96 BPM

## 2022-08-22 DIAGNOSIS — C61 PROSTATE CA: Primary | ICD-10-CM

## 2022-08-22 PROCEDURE — 96402 CHEMO HORMON ANTINEOPL SQ/IM: CPT | Mod: PBBFAC

## 2022-08-22 PROCEDURE — 99213 PR OFFICE/OUTPT VISIT, EST, LEVL III, 20-29 MIN: ICD-10-PCS | Mod: S$PBB,,, | Performed by: NURSE PRACTITIONER

## 2022-08-22 PROCEDURE — 1160F PR REVIEW ALL MEDS BY PRESCRIBER/CLIN PHARMACIST DOCUMENTED: ICD-10-PCS | Mod: CPTII,,, | Performed by: NURSE PRACTITIONER

## 2022-08-22 PROCEDURE — 3079F PR MOST RECENT DIASTOLIC BLOOD PRESSURE 80-89 MM HG: ICD-10-PCS | Mod: CPTII,,, | Performed by: NURSE PRACTITIONER

## 2022-08-22 PROCEDURE — 3079F DIAST BP 80-89 MM HG: CPT | Mod: CPTII,,, | Performed by: NURSE PRACTITIONER

## 2022-08-22 PROCEDURE — 1159F PR MEDICATION LIST DOCUMENTED IN MEDICAL RECORD: ICD-10-PCS | Mod: CPTII,,, | Performed by: NURSE PRACTITIONER

## 2022-08-22 PROCEDURE — 1159F MED LIST DOCD IN RCRD: CPT | Mod: CPTII,,, | Performed by: NURSE PRACTITIONER

## 2022-08-22 PROCEDURE — 1160F RVW MEDS BY RX/DR IN RCRD: CPT | Mod: CPTII,,, | Performed by: NURSE PRACTITIONER

## 2022-08-22 PROCEDURE — 3077F PR MOST RECENT SYSTOLIC BLOOD PRESSURE >= 140 MM HG: ICD-10-PCS | Mod: CPTII,,, | Performed by: NURSE PRACTITIONER

## 2022-08-22 PROCEDURE — 3008F BODY MASS INDEX DOCD: CPT | Mod: CPTII,,, | Performed by: NURSE PRACTITIONER

## 2022-08-22 PROCEDURE — 99215 OFFICE O/P EST HI 40 MIN: CPT | Mod: PBBFAC,25

## 2022-08-22 PROCEDURE — 3008F PR BODY MASS INDEX (BMI) DOCUMENTED: ICD-10-PCS | Mod: CPTII,,, | Performed by: NURSE PRACTITIONER

## 2022-08-22 PROCEDURE — 99213 OFFICE O/P EST LOW 20 MIN: CPT | Mod: S$PBB,,, | Performed by: NURSE PRACTITIONER

## 2022-08-22 PROCEDURE — 3077F SYST BP >= 140 MM HG: CPT | Mod: CPTII,,, | Performed by: NURSE PRACTITIONER

## 2022-08-22 RX ORDER — ASPIRIN 81 MG/1
81 TABLET ORAL DAILY
COMMUNITY
Start: 2022-08-08 | End: 2022-12-06 | Stop reason: SDUPTHER

## 2022-08-22 RX ORDER — TAMSULOSIN HYDROCHLORIDE 0.4 MG/1
1 CAPSULE ORAL DAILY
COMMUNITY
Start: 2022-05-03 | End: 2023-03-06 | Stop reason: SDUPTHER

## 2022-08-22 RX ORDER — METOPROLOL SUCCINATE 50 MG/1
50 TABLET, EXTENDED RELEASE ORAL DAILY
COMMUNITY
Start: 2022-08-08 | End: 2022-09-06 | Stop reason: SDUPTHER

## 2022-08-22 RX ADMIN — LEUPROLIDE ACETATE 45 MG: KIT at 09:08

## 2022-08-22 NOTE — PROGRESS NOTES
Seen by Dr. Sanon and GERSON Haider, ANN.  Leuprolide 45mg IM given Left Gluteal.  Lot [id number]  Exp 11/14/2024.  RTC 6 months with PSA.

## 2022-08-22 NOTE — PROGRESS NOTES
Chief Complaint:   Chief Complaint   Patient presents with    Prostate Cancer     Seen in [facility] 2 months ago.  Next appt 9/2022       HPI: Patient being seen for f/u of prostate CA 4+3 in all 12 cores. being followed by oncology. PSA continues to rise, patient received Lupron 45 mg on last visit PSA now 0.84.  Patient seen referral in [facility] for evaluation treatment of possible prostatectomy it was deemed not necessary for surgery to continue Lupron. Patient urine stream, hesitancy, straining, interruption of stream, dribbling, frequency, nocturia.     Family history of urologic pathology uncertain but father and uncles did have some type of cancer but unsure of what type.       Allergies:  Review of patient's allergies indicates:  No Known Allergies    Medications:  Current Outpatient Medications   Medication Sig Dispense Refill    aspirin 81 MG Chew Take 1 tablet (81 mg total) by mouth once daily. 90 tablet 2    atorvastatin (LIPITOR) 40 MG tablet Take 1 tablet (40 mg total) by mouth once daily. 90 tablet 2    clopidogreL (PLAVIX) 75 mg tablet Take 1 tablet (75 mg total) by mouth once daily. 90 tablet 2    ergocalciferol (ERGOCALCIFEROL) 50,000 unit Cap Take 50,000 Int'l Units by mouth.      metoprolol succinate (TOPROL-XL) 50 MG 24 hr tablet Take 50 mg by mouth once daily.      tamsulosin (FLOMAX) 0.4 mg Cap   See Instructions, TAKE ONE CAPSULE BY MOUTH once a day, # 30 cap(s), 1 Refill(s), Pharmacy: [facility], 172, cm, Height/Length Dosing, 01/07/22 12:44:00 CST, 93.8, kg, Weight Dosing, 01/07/22 12:44:00 CST      aspirin (ECOTRIN) 81 MG EC tablet Take 81 mg by mouth once daily.      levoFLOXacin (LEVAQUIN) 500 MG tablet Take 500 mg by mouth once daily.      metoprolol succinate (TOPROL-XL) 25 MG 24 hr tablet Take 2 tablets (50 mg total) by mouth once daily. (Patient not taking: Reported on 8/22/2022) 180 tablet 2    tamsulosin (FLOMAX) 0.4 mg Cap Take 1 capsule by mouth once daily.       No  current facility-administered medications for this visit.       Review of Systems:  General: No fever, chills, fatigability, or weight loss.  Skin: No rashes, itching, or changes in color or texture of skin.  Chest: Denies RICE, cyanosis, wheezing, cough, and sputum production.  Abdomen: Appetite fine. No weight loss. Denies diarrhea, abdominal pain, hematemesis, or blood in stool.  Musculoskeletal: No joint stiffness or swelling. Denies back pain.  : As above.  All other review of systems negative.    PMH:  Past Medical History:   Diagnosis Date    Hypertension     Prostate cancer     Stroke        PSH:  Past Surgical History:   Procedure Laterality Date    COLONOSCOPY  12/29/2016       FamHx:  Family History   Problem Relation Age of Onset    Bone cancer Father        SocHx:  Social History     Socioeconomic History    Marital status: Single   Tobacco Use    Smoking status: Current Every Day Smoker     Packs/day: 0.50     Types: Cigarettes    Smokeless tobacco: Never Used   Substance and Sexual Activity    Alcohol use: Not Currently    Drug use: Never    Sexual activity: Not Currently       Physical Exam:  Vitals:    08/22/22 0904   BP: (!) 150/88   Pulse: 96   Resp: 20   Temp: 97.9 °F (36.6 °C)     General: A&Ox3, no apparent distress, no deformities  Neck: No masses, normal thyroid  Lungs: CTA ebenezer, no use of accessory muscles  Heart: RRR, no arrhythmias  Abdomen: Soft, NT, ND, no masses, no hernias, no hepatosplenomegaly  Lymphatic: Neck and groin nodes negative  Skin: The skin is warm and dry. No jaundice.  Ext: No c/c/e.          Labs:  PSA 0.84      Impression:  Prostate CA      Plan: RTC 6 months with PSA and Lupron injection

## 2022-09-06 ENCOUNTER — OFFICE VISIT (OUTPATIENT)
Dept: CARDIOLOGY | Facility: CLINIC | Age: 57
End: 2022-09-06
Payer: MEDICAID

## 2022-09-06 VITALS — SYSTOLIC BLOOD PRESSURE: 140 MMHG | DIASTOLIC BLOOD PRESSURE: 82 MMHG

## 2022-09-06 DIAGNOSIS — I25.10 CORONARY ARTERY DISEASE INVOLVING NATIVE CORONARY ARTERY OF NATIVE HEART WITHOUT ANGINA PECTORIS: Primary | ICD-10-CM

## 2022-09-06 DIAGNOSIS — Z72.0 TOBACCO USE: ICD-10-CM

## 2022-09-06 DIAGNOSIS — R06.09 DOE (DYSPNEA ON EXERTION): ICD-10-CM

## 2022-09-06 DIAGNOSIS — I10 PRIMARY HYPERTENSION: ICD-10-CM

## 2022-09-06 PROCEDURE — 1160F PR REVIEW ALL MEDS BY PRESCRIBER/CLIN PHARMACIST DOCUMENTED: ICD-10-PCS | Mod: CPTII,,, | Performed by: NURSE PRACTITIONER

## 2022-09-06 PROCEDURE — 3077F SYST BP >= 140 MM HG: CPT | Mod: CPTII,,, | Performed by: NURSE PRACTITIONER

## 2022-09-06 PROCEDURE — 3077F PR MOST RECENT SYSTOLIC BLOOD PRESSURE >= 140 MM HG: ICD-10-PCS | Mod: CPTII,,, | Performed by: NURSE PRACTITIONER

## 2022-09-06 PROCEDURE — 3079F DIAST BP 80-89 MM HG: CPT | Mod: CPTII,,, | Performed by: NURSE PRACTITIONER

## 2022-09-06 PROCEDURE — 99213 OFFICE O/P EST LOW 20 MIN: CPT | Mod: S$PBB,,, | Performed by: NURSE PRACTITIONER

## 2022-09-06 PROCEDURE — 1159F PR MEDICATION LIST DOCUMENTED IN MEDICAL RECORD: ICD-10-PCS | Mod: CPTII,,, | Performed by: NURSE PRACTITIONER

## 2022-09-06 PROCEDURE — 99213 PR OFFICE/OUTPT VISIT, EST, LEVL III, 20-29 MIN: ICD-10-PCS | Mod: S$PBB,,, | Performed by: NURSE PRACTITIONER

## 2022-09-06 PROCEDURE — 3079F PR MOST RECENT DIASTOLIC BLOOD PRESSURE 80-89 MM HG: ICD-10-PCS | Mod: CPTII,,, | Performed by: NURSE PRACTITIONER

## 2022-09-06 PROCEDURE — 1160F RVW MEDS BY RX/DR IN RCRD: CPT | Mod: CPTII,,, | Performed by: NURSE PRACTITIONER

## 2022-09-06 PROCEDURE — 99214 OFFICE O/P EST MOD 30 MIN: CPT | Mod: PBBFAC | Performed by: NURSE PRACTITIONER

## 2022-09-06 PROCEDURE — 1159F MED LIST DOCD IN RCRD: CPT | Mod: CPTII,,, | Performed by: NURSE PRACTITIONER

## 2022-09-06 RX ORDER — METOPROLOL SUCCINATE 50 MG/1
50 TABLET, EXTENDED RELEASE ORAL DAILY
Qty: 90 TABLET | Refills: 3 | Status: SHIPPED | OUTPATIENT
Start: 2022-09-06 | End: 2023-06-05 | Stop reason: SDUPTHER

## 2022-09-06 NOTE — PROGRESS NOTES
CHIEF COMPLAINT:   Chief Complaint   Patient presents with    3mt f/u with echo/lab     Pt states last night he had episode pre-syncope after he had been lying down for a while                     Review of patient's allergies indicates:  No Known Allergies                                       HPI:  Burton Vasquez 56 y.o. male with a past medical history of hypertension, gastritis, esophagitis, prostate cancer status post radiation, chemotherapy, and Tobacco Abuse presents for 3 month follow-up for results of echocardiogram.  Patient completed an Echocardiogram on June 17, 2022 which revealed an ejection fraction of 60% with moderate MR and mild TR.  See report below.  Patient underwent coronary angiogram procedure on December 8, 2021 which revealed nonobstructive coronary artery disease. See report below. Patient had a hospital admission from October 2021 with NSTEMI, troponin peak 0.12. He had a subsequent Lexiscan stress test on 10.4.21 that revealed a small area of mild inferior ischemia. He completed an Echocardiogram 10.4.21 that revealed a left ventricular ejection fraction measured approximately 50 to 55%. (see report below).  He was also noted to have positive blood cultures for Streptococcus mitis bacteremia and had a subsequent TAQUERIA on 10.8.21 in which a 12 x 7 mm echodensity noted on MV (consistent with being a vegetation). The patient has had repeat negative cultures. The patient received oral antibiotics and received 2-week course of outpatient IV antibiotics.     Patient continues to endorse shortness of breath with exertion.  He also reported an episode of dizziness last night when he went to stand.  He denies near-syncope or syncope.  He denies any previous episodes of dizziness.  He denies chest pain, palpitations, orthopnea, PND, or peripheral edema.  He continues to smoke about 10 cigarettes per day, but states he is trying to quit on his own.      Echocardiogram 6.17.22:  The left ventricle is  normal in size with mild concentric hypertrophy and normal systolic function.  The estimated ejection fraction is 60%.  Normal left ventricular diastolic function.  Normal right ventricular size with normal right ventricular systolic function.  Mild right atrial enlargement.  Moderate mitral regurgitation.  Mild tricuspid regurgitation.  Moderate left atrial enlargement.    Coronary Angiogram December 8, 2021:  Left main: Large vessel that tapers distally. No stenosis.  LAD: Large vessel. 20 to 30% proximal/mid segment stenosis.  Diagonal 1: Small vessel. No stenosis.  Diagonal 2: Tiny size vessel. No stenosis.  Circumflex: Large vessel. No stenosis.  Obtuse marginal 1: Small vessel. 50% ostial stenosis.  Left PDA: Small size vessel. No stenosis.  RCA: Medium sized vessel. 2 sequential lesions, both with 50% stenosis mid vessel.  PLB: Tiny to small vessel. No stenosis.  PDA: Tiny to small vessel. No stenosis.  Angiogram summary:  Coronaries: Nonobstructive coronary artery disease  LVEDP: Normal end-diastolic pressure.      TTE 10.1.21  Left ventricular ejection fraction is measured approximately 50 to 55%  Structurally normal mitral valve  Mild mitral regurgitation  Structurally normal trileaflet aortic valve  Tricuspid valve is structurally normal  There is mild tricuspid regurgitation with RVSP estimated at 28 mmHg  The pulmonic valve was not well visualized  Normal size left atrium  Normal right atrial size  Normal right ventricular size with preserved RV function                                                                                                                                                                                                                                                                                                                                                                                                                                                                                          Patient Active Problem List   Diagnosis    Coronary artery disease involving native coronary artery of native heart without angina pectoris    Primary hypertension    Tobacco use    RICE (dyspnea on exertion)    Prostate CA     Past Surgical History:   Procedure Laterality Date    COLONOSCOPY  12/29/2016     Social History     Socioeconomic History    Marital status: Single   Tobacco Use    Smoking status: Every Day     Packs/day: 0.50     Types: Cigarettes    Smokeless tobacco: Never   Substance and Sexual Activity    Alcohol use: Not Currently    Drug use: Never    Sexual activity: Not Currently        Family History   Problem Relation Age of Onset    Hypertension Mother     Kidney failure Mother     Bone cancer Father          Current Outpatient Medications:     alprostadiL (MUSE) 250 MCG pellet, Place 250 mcg into the urethra as needed., Disp: , Rfl:     aspirin (ECOTRIN) 81 MG EC tablet, Take 81 mg by mouth once daily., Disp: , Rfl:     atorvastatin (LIPITOR) 40 MG tablet, Take 1 tablet (40 mg total) by mouth once daily., Disp: 90 tablet, Rfl: 2    clopidogreL (PLAVIX) 75 mg tablet, Take 1 tablet (75 mg total) by mouth once daily., Disp: 90 tablet, Rfl: 2    ergocalciferol (ERGOCALCIFEROL) 50,000 unit Cap, Take 50,000 Int'l Units by mouth., Disp: , Rfl:     metoprolol succinate (TOPROL-XL) 50 MG 24 hr tablet, Take 50 mg by mouth once daily., Disp: , Rfl:     tamsulosin (FLOMAX) 0.4 mg Cap,  See Instructions, TAKE ONE CAPSULE BY MOUTH once a day, # 30 cap(s), 1 Refill(s), Pharmacy: [facility], 172, cm, Height/Length Dosing, 01/07/22 12:44:00 CST, 93.8, kg, Weight Dosing, 01/07/22 12:44:00 CST, Disp: , Rfl:     aspirin 81 MG Chew, Take 1 tablet (81 mg total) by mouth once daily., Disp: 90 tablet, Rfl: 2    levoFLOXacin (LEVAQUIN) 500 MG tablet, Take 500 mg by mouth once daily., Disp: , Rfl:     metoprolol succinate (TOPROL-XL) 25 MG 24 hr tablet, Take 2 tablets (50 mg total) by mouth once  "daily. (Patient not taking: Reported on 8/22/2022), Disp: 180 tablet, Rfl: 2    tamsulosin (FLOMAX) 0.4 mg Cap, Take 1 capsule by mouth once daily., Disp: , Rfl:      ROS:                                                                                                                                                                             Review of Systems   Constitutional: Negative.    Respiratory:  Positive for shortness of breath.    Gastrointestinal: Negative.    Musculoskeletal: Negative.    Skin: Negative.    Neurological:  Positive for dizziness.   Psychiatric/Behavioral: Negative.        Blood pressure (!) 140/82, pulse (P) 80, temperature (P) 98.1 °F (36.7 °C), resp. rate (P) 18, height (P) 5' 7.72" (1.72 m), weight (P) 93.2 kg (205 lb 7.5 oz), SpO2 (P) 100 %.   PE:  Physical Exam  Constitutional:       Appearance: Normal appearance.   HENT:      Head: Normocephalic.   Eyes:      Extraocular Movements: Extraocular movements intact.   Cardiovascular:      Rate and Rhythm: Normal rate and regular rhythm.   Pulmonary:      Effort: Pulmonary effort is normal.   Abdominal:      Palpations: Abdomen is soft.   Musculoskeletal:         General: Normal range of motion.      Cervical back: Neck supple.   Skin:     General: Skin is warm and dry.   Neurological:      General: No focal deficit present.      Mental Status: He is alert and oriented to person, place, and time.   Psychiatric:         Mood and Affect: Mood normal.      ASSESSMENT/PLAN:  Coronary Artery Disease - nonobstructive per Coronary Angiogram 12.8.21  NSTEMI in Oct. 2021  - Lexiscan stress test- small area of mild inferior ischemia with reversibility (10.4.21)  - LVEF 60% per Echo 6.17.22  - Denies chest pain - continues to endorse RICE  - Continue ASA, Plavix, Atorvastatin, metoprolol succinate, and SL nitro prn  - Counseled on heart healthy diet, exercise, and smoking cessation.    RICE   -EF 60% per Echo 6.17.22    Mitral Regurgitation  -Moderate " MR per Echo 6.17.22    HTN   - BP not at goal - 140/82 - patient has not yet taken his Metoprolol Succinate  - Patient instructed to keep home BP log and call clinic in 2 weeks with readings  - If BP is not at goal, will consider addition of ACEi  - Counseled on low sodium diet and exercise    Tobacco Use  - He continues to smoke about 10 cigarettes per day, but states he is trying to quit on his own    - Counseled on the importance of smoking cessation    Prostate Cancer  - Follow up with Oncology as directed    Follow up in Cardiology Clinic in 3 months  Follow up with PCP and Oncology as directed

## 2022-09-07 ENCOUNTER — APPOINTMENT (OUTPATIENT)
Dept: LAB | Facility: HOSPITAL | Age: 57
End: 2022-09-07
Attending: NURSE PRACTITIONER
Payer: MEDICAID

## 2022-09-07 ENCOUNTER — TELEPHONE (OUTPATIENT)
Dept: CARDIOLOGY | Facility: CLINIC | Age: 57
End: 2022-09-07
Payer: MEDICAID

## 2022-09-07 DIAGNOSIS — I25.10 CORONARY ARTERY DISEASE INVOLVING NATIVE CORONARY ARTERY OF NATIVE HEART WITHOUT ANGINA PECTORIS: ICD-10-CM

## 2022-09-07 DIAGNOSIS — E78.5 HYPERLIPIDEMIA, UNSPECIFIED HYPERLIPIDEMIA TYPE: Primary | ICD-10-CM

## 2022-09-07 LAB
ALBUMIN SERPL-MCNC: 4 GM/DL (ref 3.5–5)
ALBUMIN/GLOB SERPL: 1 RATIO (ref 1.1–2)
ALP SERPL-CCNC: 120 UNIT/L (ref 40–150)
ALT SERPL-CCNC: 16 UNIT/L (ref 0–55)
AST SERPL-CCNC: 19 UNIT/L (ref 5–34)
BILIRUBIN DIRECT+TOT PNL SERPL-MCNC: 0.6 MG/DL
BUN SERPL-MCNC: 16.1 MG/DL (ref 8.4–25.7)
CALCIUM SERPL-MCNC: 10.1 MG/DL (ref 8.4–10.2)
CHLORIDE SERPL-SCNC: 105 MMOL/L (ref 98–107)
CHOLEST SERPL-MCNC: 218 MG/DL
CHOLEST/HDLC SERPL: 4 {RATIO} (ref 0–5)
CO2 SERPL-SCNC: 27 MMOL/L (ref 22–29)
CREAT SERPL-MCNC: 1.28 MG/DL (ref 0.73–1.18)
GFR SERPLBLD CREATININE-BSD FMLA CKD-EPI: >60 MLS/MIN/1.73/M2
GLOBULIN SER-MCNC: 4 GM/DL (ref 2.4–3.5)
GLUCOSE SERPL-MCNC: 130 MG/DL (ref 74–100)
HDLC SERPL-MCNC: 54 MG/DL (ref 35–60)
LDLC SERPL CALC-MCNC: 133 MG/DL (ref 50–140)
POTASSIUM SERPL-SCNC: 4 MMOL/L (ref 3.5–5.1)
PROT SERPL-MCNC: 8 GM/DL (ref 6.4–8.3)
SODIUM SERPL-SCNC: 140 MMOL/L (ref 136–145)
TRIGL SERPL-MCNC: 157 MG/DL (ref 34–140)
VLDLC SERPL CALC-MCNC: 31 MG/DL

## 2022-09-07 PROCEDURE — 80053 COMPREHEN METABOLIC PANEL: CPT

## 2022-09-07 PROCEDURE — 80061 LIPID PANEL: CPT

## 2022-09-07 PROCEDURE — 36415 COLL VENOUS BLD VENIPUNCTURE: CPT

## 2022-10-09 ENCOUNTER — HOSPITAL ENCOUNTER (OUTPATIENT)
Facility: HOSPITAL | Age: 57
Discharge: HOME | End: 2022-10-12
Attending: FAMILY MEDICINE | Admitting: STUDENT IN AN ORGANIZED HEALTH CARE EDUCATION/TRAINING PROGRAM
Payer: MEDICAID

## 2022-10-09 DIAGNOSIS — D64.9 SYMPTOMATIC ANEMIA: Primary | ICD-10-CM

## 2022-10-09 DIAGNOSIS — R07.9 CHEST PAIN: ICD-10-CM

## 2022-10-09 DIAGNOSIS — K92.2 GASTROINTESTINAL HEMORRHAGE, UNSPECIFIED GASTROINTESTINAL HEMORRHAGE TYPE: ICD-10-CM

## 2022-10-09 DIAGNOSIS — D64.9 ANEMIA REQUIRING TRANSFUSIONS: ICD-10-CM

## 2022-10-09 LAB
ABO + RH BLD: NORMAL
ABO + RH BLD: NORMAL
ABORH RETYPE: NORMAL
ALBUMIN SERPL-MCNC: 3.6 GM/DL (ref 3.5–5)
ALBUMIN/GLOB SERPL: 1.3 RATIO (ref 1.1–2)
ALP SERPL-CCNC: 99 UNIT/L (ref 40–150)
ALT SERPL-CCNC: 17 UNIT/L (ref 0–55)
AST SERPL-CCNC: 17 UNIT/L (ref 5–34)
BASOPHILS # BLD AUTO: 0.03 X10(3)/MCL (ref 0–0.2)
BASOPHILS NFR BLD AUTO: 0.6 %
BILIRUBIN DIRECT+TOT PNL SERPL-MCNC: 0.6 MG/DL
BLD PROD TYP BPU: NORMAL
BLD PROD TYP BPU: NORMAL
BLOOD UNIT EXPIRATION DATE: NORMAL
BLOOD UNIT EXPIRATION DATE: NORMAL
BLOOD UNIT TYPE CODE: 7300
BLOOD UNIT TYPE CODE: 7300
BNP BLD-MCNC: 25.6 PG/ML
BUN SERPL-MCNC: 13.2 MG/DL (ref 8.4–25.7)
CALCIUM SERPL-MCNC: 9.2 MG/DL (ref 8.4–10.2)
CHLORIDE SERPL-SCNC: 107 MMOL/L (ref 98–107)
CK MB SERPL-MCNC: 0.8 NG/ML
CK SERPL-CCNC: 310 U/L (ref 30–200)
CO2 SERPL-SCNC: 24 MMOL/L (ref 22–29)
COLOR STL: ABNORMAL
CONSISTENCY STL: ABNORMAL
CREAT SERPL-MCNC: 1.12 MG/DL (ref 0.73–1.18)
CROSSMATCH INTERPRETATION: NORMAL
CROSSMATCH INTERPRETATION: NORMAL
CRP SERPL-MCNC: 1.2 MG/L
DISPENSE STATUS: NORMAL
DISPENSE STATUS: NORMAL
EOSINOPHIL # BLD AUTO: 0.16 X10(3)/MCL (ref 0–0.9)
EOSINOPHIL NFR BLD AUTO: 3.1 %
ERYTHROCYTE [DISTWIDTH] IN BLOOD BY AUTOMATED COUNT: 16.4 % (ref 11.5–17)
ERYTHROCYTE [DISTWIDTH] IN BLOOD BY AUTOMATED COUNT: 16.5 % (ref 11.5–17)
ERYTHROCYTE [SEDIMENTATION RATE] IN BLOOD: 4 MM/HR (ref 0–15)
FERRITIN SERPL-MCNC: 10.27 NG/ML (ref 21.81–274.66)
FOLATE SERPL-MCNC: 14.1 NG/ML (ref 7–31.4)
GFR SERPLBLD CREATININE-BSD FMLA CKD-EPI: >60 MLS/MIN/1.73/M2
GLOBULIN SER-MCNC: 2.8 GM/DL (ref 2.4–3.5)
GLUCOSE SERPL-MCNC: 195 MG/DL (ref 74–100)
GROUP & RH: NORMAL
HCT VFR BLD AUTO: 20.1 % (ref 42–52)
HCT VFR BLD AUTO: 20.2 % (ref 42–52)
HEMOCCULT SP1 STL QL: POSITIVE
HGB BLD-MCNC: 6.3 GM/DL (ref 14–18)
HGB BLD-MCNC: 6.4 GM/DL (ref 14–18)
IMM GRANULOCYTES # BLD AUTO: 0.03 X10(3)/MCL (ref 0–0.04)
IMM GRANULOCYTES NFR BLD AUTO: 0.6 %
INDIRECT COOMBS GEL: NORMAL
IRON SATN MFR SERPL: 4 % (ref 20–50)
IRON SERPL-MCNC: 17 UG/DL (ref 65–175)
LYMPHOCYTES # BLD AUTO: 1.49 X10(3)/MCL (ref 0.6–4.6)
LYMPHOCYTES NFR BLD AUTO: 29.3 %
MCH RBC QN AUTO: 24.8 PG (ref 27–31)
MCH RBC QN AUTO: 24.9 PG (ref 27–31)
MCHC RBC AUTO-ENTMCNC: 31.2 MG/DL (ref 33–36)
MCHC RBC AUTO-ENTMCNC: 31.8 MG/DL (ref 33–36)
MCV RBC AUTO: 78.2 FL (ref 80–94)
MCV RBC AUTO: 79.5 FL (ref 80–94)
MONOCYTES # BLD AUTO: 0.29 X10(3)/MCL (ref 0.1–1.3)
MONOCYTES NFR BLD AUTO: 5.7 %
NEUTROPHILS # BLD AUTO: 3.1 X10(3)/MCL (ref 2.1–9.2)
NEUTROPHILS NFR BLD AUTO: 60.7 %
NRBC BLD AUTO-RTO: 0 %
NRBC BLD AUTO-RTO: 0 %
PLATELET # BLD AUTO: 129 X10(3)/MCL (ref 130–400)
PLATELET # BLD AUTO: 143 X10(3)/MCL (ref 130–400)
PMV BLD AUTO: 10.3 FL (ref 7.4–10.4)
PMV BLD AUTO: 10.7 FL (ref 7.4–10.4)
POCT GLUCOSE: 124 MG/DL (ref 70–110)
POCT GLUCOSE: 135 MG/DL (ref 70–110)
POCT GLUCOSE: 172 MG/DL (ref 70–110)
POTASSIUM SERPL-SCNC: 4.3 MMOL/L (ref 3.5–5.1)
PROT SERPL-MCNC: 6.4 GM/DL (ref 6.4–8.3)
RBC # BLD AUTO: 2.54 X10(6)/MCL (ref 4.7–6.1)
RBC # BLD AUTO: 2.57 X10(6)/MCL (ref 4.7–6.1)
RET# (OHS): 0.12 (ref 0.03–0.1)
RETICULOCYTE COUNT AUTOMATED (OLG): 4.59 % (ref 1.1–2.1)
SARS-COV-2 RDRP RESP QL NAA+PROBE: NEGATIVE
SODIUM SERPL-SCNC: 139 MMOL/L (ref 136–145)
TIBC SERPL-MCNC: 396 UG/DL (ref 69–240)
TIBC SERPL-MCNC: 413 UG/DL (ref 250–450)
TRANSFERRIN SERPL-MCNC: 389 MG/DL (ref 174–364)
TROPONIN I SERPL-MCNC: <0.01 NG/ML (ref 0–0.04)
UNIT NUMBER: NORMAL
UNIT NUMBER: NORMAL
VIT B12 SERPL-MCNC: 779 PG/ML (ref 213–816)
WBC # SPEC AUTO: 5.1 X10(3)/MCL (ref 4.5–11.5)
WBC # SPEC AUTO: 5.3 X10(3)/MCL (ref 4.5–11.5)

## 2022-10-09 PROCEDURE — 85651 RBC SED RATE NONAUTOMATED: CPT | Performed by: FAMILY MEDICINE

## 2022-10-09 PROCEDURE — 96360 HYDRATION IV INFUSION INIT: CPT

## 2022-10-09 PROCEDURE — 25000003 PHARM REV CODE 250: Performed by: FAMILY MEDICINE

## 2022-10-09 PROCEDURE — 84484 ASSAY OF TROPONIN QUANT: CPT | Performed by: FAMILY MEDICINE

## 2022-10-09 PROCEDURE — 82746 ASSAY OF FOLIC ACID SERUM: CPT

## 2022-10-09 PROCEDURE — G0378 HOSPITAL OBSERVATION PER HR: HCPCS

## 2022-10-09 PROCEDURE — 99284 EMERGENCY DEPT VISIT MOD MDM: CPT | Mod: 25

## 2022-10-09 PROCEDURE — 87635 SARS-COV-2 COVID-19 AMP PRB: CPT | Performed by: FAMILY MEDICINE

## 2022-10-09 PROCEDURE — 82607 VITAMIN B-12: CPT

## 2022-10-09 PROCEDURE — 82270 OCCULT BLOOD FECES: CPT

## 2022-10-09 PROCEDURE — 82553 CREATINE MB FRACTION: CPT | Performed by: FAMILY MEDICINE

## 2022-10-09 PROCEDURE — 99285 EMERGENCY DEPT VISIT HI MDM: CPT | Mod: 25

## 2022-10-09 PROCEDURE — 85045 AUTOMATED RETICULOCYTE COUNT: CPT

## 2022-10-09 PROCEDURE — 85027 COMPLETE CBC AUTOMATED: CPT | Performed by: FAMILY MEDICINE

## 2022-10-09 PROCEDURE — 63600175 PHARM REV CODE 636 W HCPCS

## 2022-10-09 PROCEDURE — 25000003 PHARM REV CODE 250

## 2022-10-09 PROCEDURE — 82550 ASSAY OF CK (CPK): CPT | Performed by: FAMILY MEDICINE

## 2022-10-09 PROCEDURE — 96372 THER/PROPH/DIAG INJ SC/IM: CPT | Mod: 59

## 2022-10-09 PROCEDURE — 86920 COMPATIBILITY TEST SPIN: CPT

## 2022-10-09 PROCEDURE — 82728 ASSAY OF FERRITIN: CPT

## 2022-10-09 PROCEDURE — 36415 COLL VENOUS BLD VENIPUNCTURE: CPT | Performed by: FAMILY MEDICINE

## 2022-10-09 PROCEDURE — 86850 RBC ANTIBODY SCREEN: CPT | Performed by: FAMILY MEDICINE

## 2022-10-09 PROCEDURE — 80053 COMPREHEN METABOLIC PANEL: CPT | Performed by: FAMILY MEDICINE

## 2022-10-09 PROCEDURE — 86140 C-REACTIVE PROTEIN: CPT | Performed by: FAMILY MEDICINE

## 2022-10-09 PROCEDURE — 83880 ASSAY OF NATRIURETIC PEPTIDE: CPT | Performed by: FAMILY MEDICINE

## 2022-10-09 PROCEDURE — 83540 ASSAY OF IRON: CPT

## 2022-10-09 PROCEDURE — 96361 HYDRATE IV INFUSION ADD-ON: CPT

## 2022-10-09 PROCEDURE — 93005 ELECTROCARDIOGRAM TRACING: CPT

## 2022-10-09 PROCEDURE — P9016 RBC LEUKOCYTES REDUCED: HCPCS

## 2022-10-09 PROCEDURE — 85025 COMPLETE CBC W/AUTO DIFF WBC: CPT | Performed by: FAMILY MEDICINE

## 2022-10-09 RX ORDER — CLOPIDOGREL BISULFATE 75 MG/1
75 TABLET ORAL DAILY
Status: DISCONTINUED | OUTPATIENT
Start: 2022-10-09 | End: 2022-10-10

## 2022-10-09 RX ORDER — ATORVASTATIN CALCIUM 40 MG/1
40 TABLET, FILM COATED ORAL DAILY
Status: DISCONTINUED | OUTPATIENT
Start: 2022-10-09 | End: 2022-10-12 | Stop reason: HOSPADM

## 2022-10-09 RX ORDER — PANTOPRAZOLE SODIUM 40 MG/1
40 TABLET, DELAYED RELEASE ORAL DAILY
Status: DISCONTINUED | OUTPATIENT
Start: 2022-10-10 | End: 2022-10-12 | Stop reason: HOSPADM

## 2022-10-09 RX ORDER — GLUCAGON 1 MG
1 KIT INJECTION
Status: DISCONTINUED | OUTPATIENT
Start: 2022-10-09 | End: 2022-10-12 | Stop reason: HOSPADM

## 2022-10-09 RX ORDER — METOPROLOL SUCCINATE 50 MG/1
50 TABLET, EXTENDED RELEASE ORAL DAILY
Status: DISCONTINUED | OUTPATIENT
Start: 2022-10-09 | End: 2022-10-12 | Stop reason: HOSPADM

## 2022-10-09 RX ORDER — INSULIN ASPART 100 [IU]/ML
0-5 INJECTION, SOLUTION INTRAVENOUS; SUBCUTANEOUS
Status: DISCONTINUED | OUTPATIENT
Start: 2022-10-09 | End: 2022-10-12 | Stop reason: HOSPADM

## 2022-10-09 RX ORDER — ENOXAPARIN SODIUM 100 MG/ML
40 INJECTION SUBCUTANEOUS EVERY 24 HOURS
Status: DISCONTINUED | OUTPATIENT
Start: 2022-10-09 | End: 2022-10-10

## 2022-10-09 RX ORDER — TAMSULOSIN HYDROCHLORIDE 0.4 MG/1
0.4 CAPSULE ORAL DAILY
Status: DISCONTINUED | OUTPATIENT
Start: 2022-10-09 | End: 2022-10-12 | Stop reason: HOSPADM

## 2022-10-09 RX ORDER — DEXTROSE MONOHYDRATE 100 MG/ML
12.5 INJECTION, SOLUTION INTRAVENOUS
Status: DISCONTINUED | OUTPATIENT
Start: 2022-10-09 | End: 2022-10-12 | Stop reason: HOSPADM

## 2022-10-09 RX ORDER — IBUPROFEN 200 MG
24 TABLET ORAL
Status: DISCONTINUED | OUTPATIENT
Start: 2022-10-09 | End: 2022-10-12 | Stop reason: HOSPADM

## 2022-10-09 RX ORDER — ASPIRIN 81 MG/1
81 TABLET ORAL DAILY
Status: DISCONTINUED | OUTPATIENT
Start: 2022-10-09 | End: 2022-10-11

## 2022-10-09 RX ORDER — DEXTROSE MONOHYDRATE 100 MG/ML
25 INJECTION, SOLUTION INTRAVENOUS
Status: DISCONTINUED | OUTPATIENT
Start: 2022-10-09 | End: 2022-10-12 | Stop reason: HOSPADM

## 2022-10-09 RX ORDER — IBUPROFEN 200 MG
16 TABLET ORAL
Status: DISCONTINUED | OUTPATIENT
Start: 2022-10-09 | End: 2022-10-12 | Stop reason: HOSPADM

## 2022-10-09 RX ADMIN — ATORVASTATIN CALCIUM 40 MG: 40 TABLET, FILM COATED ORAL at 12:10

## 2022-10-09 RX ADMIN — CLOPIDOGREL 75 MG: 75 TABLET, FILM COATED ORAL at 12:10

## 2022-10-09 RX ADMIN — METOPROLOL SUCCINATE 50 MG: 50 TABLET, FILM COATED, EXTENDED RELEASE ORAL at 12:10

## 2022-10-09 RX ADMIN — ENOXAPARIN SODIUM 40 MG: 40 INJECTION SUBCUTANEOUS at 05:10

## 2022-10-09 RX ADMIN — SODIUM CHLORIDE 1000 ML: 9 INJECTION, SOLUTION INTRAVENOUS at 08:10

## 2022-10-09 RX ADMIN — ASPIRIN 81 MG: 81 TABLET, COATED ORAL at 12:10

## 2022-10-09 RX ADMIN — TAMSULOSIN HYDROCHLORIDE 0.4 MG: 0.4 CAPSULE ORAL at 12:10

## 2022-10-09 NOTE — PLAN OF CARE
Problem: Adult Inpatient Plan of Care  Goal: Plan of Care Review  10/9/2022 1209 by Karely Garcia RN  Outcome: Ongoing, Progressing  10/9/2022 1208 by Karely Garcia RN  Outcome: Ongoing, Progressing     Problem: Fatigue  Goal: Improved Activity Tolerance  10/9/2022 1209 by Karely Garcia RN  Outcome: Ongoing, Progressing  10/9/2022 1208 by Karely Garcia RN  Outcome: Ongoing, Progressing     Problem: Anemia  Goal: Anemia Symptom Improvement  10/9/2022 1209 by Karely Garcia, LIZ  Outcome: Ongoing, Progressing  10/9/2022 1208 by Karely Garcia RN  Outcome: Ongoing, Progressing     Problem: Diabetes Comorbidity  Goal: Blood Glucose Level Within Targeted Range  Outcome: Ongoing, Progressing

## 2022-10-09 NOTE — PLAN OF CARE
PGY2 ADDENDUM:      Patient was seen in conjunction with intern, agree with assessment and plan on initial IM H&P with included inclusions and addendum. Of note, the patient is a 56 y.o. male with PMH of hypertension, hyperlipidemia, NSTEMI, normocytic anemia, TIA, adenocarcinoma of the prostate (currently follows with Dr. Carney,  at last visit (Visit date not found) in August of 2022).  He presented to [facility] ED on 10/9/2022 with a primary complaint of a increased dyspnea on exertion.  Patient reports he had 1 episode of hematochezia last Monday and since his stool color has been normal.  Patient was found to be tachycardic in the ED physical exam was significant for pale conjunctiva, grade 1 systolic murmur likely flow murmur also noted on exam.  Patient's H&H was 6.4/20 new was admitted for symptomatic anemia.    Assessment & Plan:     Symptomatic microcytic anemia  Hypertension   Hyperlipidemia   TIA   Adenocarcinoma of the prostate    -transfuse 2 units pRBCs follow-up CBC in the morning  -pending B12, iron, TIBC, blood smear, folate  -continue home blood pressure medications:  Metoprolol 50  -continue Flomax, aspirin 81, Plavix 75, Lipitor 40 mg  -continue to monitor    CODE STATUS: Full Code  Access:  PIV  Antimicrobials:  None  Diet: Diet Adult Regular   DVT Prophylaxis: Lovenox ppx  GI Prophylaxis:  Protonix  Fluids:  None    Disposition:  Admitted for transfusion likely able for discharge tomorrow depending on H&H    Rolo García MD  Internal Medicine - PGY-2

## 2022-10-09 NOTE — PLAN OF CARE
Problem: Adult Inpatient Plan of Care  Goal: Plan of Care Review  Outcome: Ongoing, Progressing  Goal: Patient-Specific Goal (Individualized)  Outcome: Ongoing, Progressing  Goal: Absence of Hospital-Acquired Illness or Injury  Outcome: Ongoing, Progressing  Goal: Optimal Comfort and Wellbeing  Outcome: Ongoing, Progressing  Goal: Readiness for Transition of Care  Outcome: Ongoing, Progressing     Problem: Fatigue  Goal: Improved Activity Tolerance  Outcome: Ongoing, Progressing     Problem: Anemia  Goal: Anemia Symptom Improvement  Outcome: Ongoing, Progressing

## 2022-10-09 NOTE — ED PROVIDER NOTES
Encounter Date: 10/9/2022       History     Chief Complaint   Patient presents with    Shortness of Breath    Chest Pain     C/o sob, midsternal chest pain since Friday. O2 sats 100%     56-year-old gentleman presents emergency room with complaints of increased dyspnea on exertion which began 3 days ago and has progressively worsened.  Patient reports intermittent chest pain, but mostly profound shortness of breath. Symptoms are worse with exertion, but now even with minimal exertion he is having symptoms. patient denies orthopnea or proximal nocturnal dyspnea.  Patient reports a previous history of endocarditis.  Patient denies any recent upper respiratory infection.  Denies lower extremity edema. patient's size aided when the symptoms are not improving but worsening to come to the emergency room for evaluation.  Patient also has a history of hypertension, currently compliant with medications.    The history is provided by the patient.   Review of patient's allergies indicates:  No Known Allergies  Past Medical History:   Diagnosis Date    Hypertension     Prostate cancer     Stroke      Past Surgical History:   Procedure Laterality Date    COLONOSCOPY  12/29/2016    COLONOSCOPY Left 10/12/2022    Procedure: COLONOSCOPY;  Surgeon: Meggan Lester MD;  Location: [facility] ENDOSCOPY;  Service: Gastroenterology;  Laterality: Left;     Family History   Problem Relation Age of Onset    Hypertension Mother     Kidney failure Mother     Bone cancer Father      Social History     Tobacco Use    Smoking status: Former     Packs/day: 0.50     Types: Cigarettes    Smokeless tobacco: Never   Substance Use Topics    Alcohol use: Not Currently    Drug use: Never     Review of Systems   Constitutional:  Negative for chills, fatigue and fever.   HENT:  Negative for ear pain, rhinorrhea and sore throat.    Eyes:  Negative for photophobia and pain.   Respiratory:  Positive for shortness of breath. Negative for cough and wheezing.     Cardiovascular:  Positive for chest pain.   Gastrointestinal:  Negative for abdominal pain, diarrhea, nausea and vomiting.   Genitourinary:  Negative for dysuria.   Neurological:  Negative for dizziness, weakness and headaches.   All other systems reviewed and are negative.    Physical Exam     Initial Vitals [10/09/22 0808]   BP Pulse Resp Temp SpO2   (!) 147/85 105 20 97.5 °F (36.4 °C) 100 %      MAP       --         Physical Exam    Nursing note and vitals reviewed.  Constitutional: He appears well-developed and well-nourished.   HENT:   Head: Normocephalic and atraumatic.   Eyes: EOM are normal. Pupils are equal, round, and reactive to light.   Neck: Neck supple.   Normal range of motion.  Cardiovascular:  Regular rhythm and intact distal pulses.     Exam reveals no gallop and no friction rub.       Murmur heard.  Systolic murmur is present with a grade of 3/6.  Tachycardia, systolic murmur heard greatest at left lower sternal border (tricuspid)   Pulmonary/Chest: Breath sounds normal. No respiratory distress.   Abdominal: Abdomen is soft. Bowel sounds are normal. He exhibits no distension. There is no abdominal tenderness.   Musculoskeletal:         General: Normal range of motion.      Cervical back: Normal range of motion and neck supple.     Neurological: He is alert and oriented to person, place, and time. He has normal strength.   Skin: Skin is warm and dry. Capillary refill takes less than 2 seconds.   Psychiatric: He has a normal mood and affect. His behavior is normal. Judgment and thought content normal.       ED Course   Procedures  Labs Reviewed   COMPREHENSIVE METABOLIC PANEL - Abnormal; Notable for the following components:       Result Value    Glucose Level 195 (*)     All other components within normal limits   CK - Abnormal; Notable for the following components:    Creatine Kinase 310 (*)     All other components within normal limits   CBC WITH DIFFERENTIAL - Abnormal; Notable for the  following components:    RBC 2.57 (*)     Hgb 6.4 (*)     Hct 20.1 (*)     MCV 78.2 (*)     MCH 24.9 (*)     MCHC 31.8 (*)     MPV 10.7 (*)     All other components within normal limits   CBC WITHOUT DIFFERENTIAL - Abnormal; Notable for the following components:    RBC 2.54 (*)     Hgb 6.3 (*)     Hct 20.2 (*)     Platelet 129 (*)     MCV 79.5 (*)     MCH 24.8 (*)     MCHC 31.2 (*)     All other components within normal limits   CK-MB - Normal   TROPONIN I - Normal   SEDIMENTATION RATE, AUTOMATED - Normal   C-REACTIVE PROTEIN - Normal   B-TYPE NATRIURETIC PEPTIDE - Normal   SARS-COV-2 RNA AMPLIFICATION, QUAL - Normal   CBC W/ AUTO DIFFERENTIAL    Narrative:     The following orders were created for panel order CBC Auto Differential.  Procedure                               Abnormality         Status                     ---------                               -----------         ------                     CBC with Differential[[id number]]        Abnormal            Final result                 Please view results for these tests on the individual orders.   EXTRA TUBES    Narrative:     The following orders were created for panel order EXTRA TUBES.  Procedure                               Abnormality         Status                     ---------                               -----------         ------                     Light Blue Top Hold[[id number]]                              In process                 Red Top Hold[[id number]]                                     In process                 Pink Top Hold[[id number]]                                    In process                   Please view results for these tests on the individual orders.   LIGHT BLUE TOP HOLD   RED TOP HOLD   PINK TOP HOLD   TYPE & SCREEN   ABORH RETYPE        ECG Results              EKG 12-lead (Chest Pain) Age >30 (Final result)  Result time 10/10/22 13:28:03      Final result by Interface, Lab In [facility] (10/10/22 13:28:03)                    Narrative:    Test Reason : R07.9,    Vent. Rate : 105 BPM     Atrial Rate : 105 BPM     P-R Int : 170 ms          QRS Dur : 074 ms      QT Int : 342 ms       P-R-T Axes : 054 -05 006 degrees     QTc Int : 452 ms    Sinus tachycardia  Voltage criteria for left ventricular hypertrophy  Abnormal ECG  No previous ECGs available  Confirmed by Bonny Coelho MD ([id number]) on 10/10/2022 1:27:51 PM    Referred By: [email or web address]   SELF           Confirmed By:Bonny Coelho MD                                     EKG 12-LEAD (Final result)  Result time 10/12/22 15:18:59      Final result by Unknown User (10/12/22 15:18:59)                                      Imaging Results              X-Ray Chest 1 View (Final result)  Result time 10/09/22 09:25:16      Final result by Donna Gan MD (10/09/22 09:25:16)                   Impression:      Lung volumes are low with associated atelectatic change.      Electronically signed by: Donna Gan  Date:    10/09/2022  Time:    09:25               Narrative:    EXAMINATION:  XR CHEST 1 VIEW    CLINICAL HISTORY:  Dyspnea;    TECHNIQUE:  Single frontal view of the chest was performed.    COMPARISON:  10/12/2021    FINDINGS:  LINES AND TUBES: EKG/telemetry leads overlie the chest.    MEDIASTINUM AND SHAN: The cardiac silhouette is normal.    LUNGS: Lung volumes are low with associated atelectatic change.    PLEURA:No pleural effusion. No pneumothorax.    BONES: No acute osseous abnormality.                                       Medications   sodium chloride 0.9% bolus 1,000 mL (0 mLs Intravenous Stopped 10/9/22 0946)   polyethylene glycol (GoLYTELY) solution (2,000 mLs Oral Given 10/12/22 0603)                 ED Course as of 10/15/22 0128   Sun Oct 09, 2022   0916 Troponin I: <0.010 [MW]   0916 CPK(!): 310 [MW]   0916 CRP: 1.20 [MW]   0916 Sodium: 139 [MW]   0916 Potassium: 4.3 [MW]   0916 Chloride: 107 [MW]   0916 CO2: 24 [MW]   0916 Glucose(!): 195 [MW]   0916 WBC: 5.1 [MW]    0916 Hemoglobin(!): 6.4 [MW]   0916 Hematocrit(!): 20.1 [MW]   0916 Platelets: 143 [MW]   0917 On further discussion with patient, patient does report having occasional bright red blood per rectum.  Colonoscopy done approximately a year ago.  Patient reports last episode of a bloody bowel movement was 1 week prior.  Denies any melena.  Due to significant drop in hemoglobin and hematocrit, internal Medicine has been consulted for observation admission and transfusion. [MW]   1021 WBC: 5.3 [MW]   1021 Hemoglobin(!): 6.3 [MW]   1021 Hematocrit(!): 20.2 [MW]   1021 Platelets(!): 129 [MW]      ED Course User Index  [MW] Kg Patton MD                 Clinical Impression:   Final diagnoses:  [R07.9] Chest pain  [D64.9] Symptomatic anemia (Primary)        ED Disposition Condition    Observation Stable                Kg Patton MD  10/09/22 0957       Kg Patton MD  10/15/22 0128

## 2022-10-09 NOTE — H&P
[facility] History & Physical Note     Resident Team: [facility] Medicine List     Date of Admit: 10/9/2022    Chief Complaint     Shortness of Breath and Chest Pain (C/o sob, midsternal chest pain since Friday. O2 sats 100%)       Subjective:      History of Present Illness:  Burton Vasquez is a 56 y.o. male who with a history of HTN, HLD, NSTEMI, normocytic anemia, stroke adenocarcinoma of prostate, who presented to [facility] ED on 10/9/2022  with a primary complaint of Shortness of Breath and Chest Pain. On 10/03, pt reports of having excessive hematochezia during the day and another episode of hematochezia and melena 10/06. Since 10/06, he felt SOB at rest which was not improving and even worse w/exertion. He denies taking any new medications, NSAIDs, or prior episodes of bloody and tarry black stools in the past.       In ED VS: /85, , RR 20, SpO2 100%, T 97.5 F.  EKG sinus tachycardia per my read, official read pending.  Physical exam significant for pale conjunctiva. H/H 6.4/20.1, ESR 4, CRP 1.2,     Admit to service under observation for further management of symptomatic anemia required transfusion. Repeat studies: CBC with AM labs.      Past Medical History:  Past Medical History:   Diagnosis Date    Hypertension     Prostate cancer     Stroke        Past Surgical History:  Past Surgical History:   Procedure Laterality Date    COLONOSCOPY  12/29/2016       Family History:  Family History   Problem Relation Age of Onset    Hypertension Mother     Kidney failure Mother     Bone cancer Father        Social History:  Social History     Tobacco Use    Smoking status: Former     Packs/day: 0.50     Types: Cigarettes    Smokeless tobacco: Never   Substance Use Topics    Alcohol use: Not Currently    Drug use: Never       Allergies:  Review of patient's allergies indicates:  No Known Allergies    Home Medications:  Prior to Admission medications    Medication Sig Start Date End Date Taking? Authorizing Provider    alprostadiL (MUSE) 250 MCG pellet Place 250 mcg into the urethra as needed. 22  Historical Provider   aspirin (ECOTRIN) 81 MG EC tablet Take 81 mg by mouth once daily. 22   Historical Provider   atorvastatin (LIPITOR) 40 MG tablet Take 1 tablet (40 mg total) by mouth once daily. 22  UMANG Corona   clopidogreL (PLAVIX) 75 mg tablet Take 1 tablet (75 mg total) by mouth once daily. 22  UMANG Corona   ergocalciferol (ERGOCALCIFEROL) 50,000 unit Cap Take 50,000 Int'l Units by mouth. 10/25/21   Historical Provider   metoprolol succinate (TOPROL-XL) 50 MG 24 hr tablet Take 1 tablet (50 mg total) by mouth once daily. 22  UMANG Corona   tamsulosin (FLOMAX) 0.4 mg Cap   See Instructions, TAKE ONE CAPSULE BY MOUTH once a day, # 30 cap(s), 1 Refill(s), Pharmacy: [facility], 172, cm, Height/Length Dosing, 22 12:44:00 CST, 93.8, kg, Weight Dosing, 22 12:44:00 CST 22   Historical Provider   tamsulosin (FLOMAX) 0.4 mg Cap Take 1 capsule by mouth once daily. 5/3/22   Historical Provider   levoFLOXacin (LEVAQUIN) 500 MG tablet Take 500 mg by mouth once daily. 4/25/22 10/9/22  Historical Provider         Review of Systems:  Review of Systems   Constitutional:  Negative for chills and fever.   HENT:  Negative for nosebleeds and sore throat.    Eyes:  Negative for blurred vision.   Respiratory:  Positive for shortness of breath. Negative for hemoptysis, sputum production and wheezing.    Cardiovascular:  Positive for palpitations. Negative for chest pain and orthopnea.   Gastrointestinal:  Positive for blood in stool and melena. Negative for abdominal pain, nausea and vomiting.   Genitourinary:  Negative for dysuria and hematuria.   Musculoskeletal:  Negative for myalgias.   Neurological:  Positive for weakness. Negative for dizziness.     Objective:   Last 24 Hour Vital Signs:  BP  Min: 132/89  Max: 151/82  Temp  Av.9  "°F (36.6 °C)  Min: 97.5 °F (36.4 °C)  Max: 98.3 °F (36.8 °C)  Pulse  Av  Min: 71  Max: 105  Resp  Av.4  Min: 15  Max: 21  SpO2  Av.8 %  Min: 96 %  Max: 100 %  Height  Av' 8" (172.7 cm)  Min: 5' 8" (172.7 cm)  Max: 5' 8" (172.7 cm)  Weight  Av.3 kg (210 lb 1.6 oz)  Min: 95.3 kg (210 lb 1.6 oz)  Max: 95.3 kg (210 lb 1.6 oz)  Body mass index is 31.95 kg/m².  No intake/output data recorded.    Physical Examination:  Physical Exam  Vitals and nursing note reviewed.   Constitutional:       General: He is not in acute distress.     Appearance: Normal appearance. He is obese.   HENT:      Head: Normocephalic and atraumatic.      Mouth/Throat:      Mouth: Mucous membranes are moist.   Eyes:      Extraocular Movements: Extraocular movements intact.      Pupils: Pupils are equal, round, and reactive to light.      Comments: Conjunctival pallor    Cardiovascular:      Rate and Rhythm: Normal rate and regular rhythm.      Heart sounds: Murmur (systolic murmur w/radation to carotids) heard.   Pulmonary:      Effort: Pulmonary effort is normal.      Breath sounds: Normal breath sounds. No wheezing.   Abdominal:      General: Abdomen is flat. Bowel sounds are normal.      Tenderness: There is no abdominal tenderness.   Musculoskeletal:         General: No swelling. Normal range of motion.      Cervical back: Normal range of motion and neck supple.   Skin:     General: Skin is warm.      Capillary Refill: Capillary refill takes 2 to 3 seconds.      Coloration: Skin is not jaundiced.   Neurological:      General: No focal deficit present.      Mental Status: He is alert and oriented to person, place, and time.   Psychiatric:         Mood and Affect: Mood normal.      Laboratory:  Most Recent Data:  CBC:   Lab Results   Component Value Date    WBC 5.3 10/09/2022    HGB 6.3 (L) 10/09/2022    HCT 20.2 (L) 10/09/2022     (L) 10/09/2022    MCV 79.5 (L) 10/09/2022    RDW 16.5 10/09/2022     WBC Differential: "   Recent Labs   Lab 10/09/22  0830 10/09/22  1009   WBC 5.1 5.3   HGB 6.4* 6.3*   HCT 20.1* 20.2*    129*   MCV 78.2* 79.5*     BMP:   Lab Results   Component Value Date     10/09/2022    K 4.3 10/09/2022    CO2 24 10/09/2022    BUN 13.2 10/09/2022    CREATININE 1.12 10/09/2022    CALCIUM 9.2 10/09/2022    MG 2.00 10/21/2021    PHOS 4.7 10/21/2021     LFTs:   Lab Results   Component Value Date    ALBUMIN 3.6 10/09/2022    BILITOT 0.6 10/09/2022    AST 17 10/09/2022    ALKPHOS 99 10/09/2022    ALT 17 10/09/2022     Coags:   Lab Results   Component Value Date    INR 1.01 12/07/2021    PROTIME 13.1 12/07/2021    PTT 31.7 12/07/2021     FLP:   Lab Results   Component Value Date    CHOL 218 (H) 09/07/2022    HDL 54 09/07/2022    TRIG 157 (H) 09/07/2022     DM:   Lab Results   Component Value Date    HGBA1C 6.0 10/21/2021    HGBA1C 6.1 10/01/2021    CREATININE 1.12 10/09/2022     Thyroid:   Lab Results   Component Value Date    TSH 0.4870 10/01/2021      Anemia:   Lab Results   Component Value Date    IRON 36 (L) 10/08/2021    TIBC 239 (L) 10/08/2021    FERRITIN 756.59 (H) 10/08/2021     Cardiac:   Lab Results   Component Value Date    TROPONINI <0.010 10/09/2022    BNP 25.6 10/09/2022     Urinalysis:   Lab Results   Component Value Date    COLORU YELLOW 10/01/2021    PHUA 6.0 10/01/2021    NITRITE Negative 10/01/2021    KETONESU Trace (A) 10/01/2021    UROBILINOGEN Normal 10/01/2021    WBCUA 0-2 10/01/2021       Trended Lab Data:  Recent Labs   Lab 10/09/22  0830 10/09/22  1009   WBC 5.1 5.3   HGB 6.4* 6.3*   HCT 20.1* 20.2*    129*   MCV 78.2* 79.5*   RDW 16.4 16.5     --    K 4.3  --    CO2 24  --    BUN 13.2  --    CREATININE 1.12  --    ALBUMIN 3.6  --    BILITOT 0.6  --    AST 17  --    ALKPHOS 99  --    ALT 17  --        Trended Cardiac Data:  Recent Labs   Lab 10/09/22  0830   TROPONINI <0.010   BNP 25.6         Radiology:  Imaging Results              X-Ray Chest 1 View (Final result)   Result time 10/09/22 09:25:16      Final result by Donna Gan MD (10/09/22 09:25:16)                   Impression:      Lung volumes are low with associated atelectatic change.      Electronically signed by: Donna Gan  Date:    10/09/2022  Time:    09:25               Narrative:    EXAMINATION:  XR CHEST 1 VIEW    CLINICAL HISTORY:  Dyspnea;    TECHNIQUE:  Single frontal view of the chest was performed.    COMPARISON:  10/12/2021    FINDINGS:  LINES AND TUBES: EKG/telemetry leads overlie the chest.    MEDIASTINUM AND SHAN: The cardiac silhouette is normal.    LUNGS: Lung volumes are low with associated atelectatic change.    PLEURA:No pleural effusion. No pneumothorax.    BONES: No acute osseous abnormality.                                         Assessment & Plan:     Anemia requiring transfusion  H/H 6.4/20.1 this AM  He states he had a colonoscopy done in 12/29/2016 that showed anal fissures and EGD in 03/29/2017 that was significant for esophagitis/gastritis.   Ordered 2 units of pRBC transfusion; f/u w/morning CBC  Ordered folate, B12, Iron, TIBC, FOBT, blood smear; f/u on results    HTN  Started on home regiment: metoprolol 50 mg qd  Pt reports of not taking morning BP meds     Adenocarcinoma of Prostate (09/29/2020)  Follows Dr. Katz; last seen on 08/13/22   Restarted home Flomax 0.4 mg qd    Stroke 10/2021  Restarted home regiment ASA 81 mg qd and Plavix 75 mg qd    HLD  Restart home regiment Lipitor 40 mg qd      CODE STATUS: full  Access: pIV  Diet: adult reg  DVT Prophylaxis: Lovenox 40 mg subq  GI Prophylaxis: none  Fluids: none      Disposition: f/u on AM CBC after 2 units of pRBC; potential d/c tomorrow if normalized H/H        Sky Montgomery MD     [facility] Resident HO-1  10/09/2022

## 2022-10-10 LAB
ANISOCYTOSIS BLD QL SMEAR: ABNORMAL
BASOPHILS # BLD AUTO: 0.03 X10(3)/MCL (ref 0–0.2)
BASOPHILS NFR BLD AUTO: 0.5 %
EOSINOPHIL # BLD AUTO: 0.2 X10(3)/MCL (ref 0–0.9)
EOSINOPHIL NFR BLD AUTO: 3.2 %
ERYTHROCYTE [DISTWIDTH] IN BLOOD BY AUTOMATED COUNT: 15.9 % (ref 11.5–17)
HCT VFR BLD AUTO: 25.5 % (ref 42–52)
HGB BLD-MCNC: 8.2 GM/DL (ref 14–18)
HYPOCHROMIA BLD QL SMEAR: SLIGHT
IMM GRANULOCYTES # BLD AUTO: 0.03 X10(3)/MCL (ref 0–0.04)
IMM GRANULOCYTES NFR BLD AUTO: 0.5 %
LYMPHOCYTES # BLD AUTO: 1.82 X10(3)/MCL (ref 0.6–4.6)
LYMPHOCYTES NFR BLD AUTO: 28.8 %
MCH RBC QN AUTO: 25.5 PG (ref 27–31)
MCHC RBC AUTO-ENTMCNC: 32.2 MG/DL (ref 33–36)
MCV RBC AUTO: 79.4 FL (ref 80–94)
MONOCYTES # BLD AUTO: 0.43 X10(3)/MCL (ref 0.1–1.3)
MONOCYTES NFR BLD AUTO: 6.8 %
NEUTROPHILS # BLD AUTO: 3.8 X10(3)/MCL (ref 2.1–9.2)
NEUTROPHILS NFR BLD AUTO: 60.2 %
NRBC BLD AUTO-RTO: 0.5 %
PLATELET # BLD AUTO: 135 X10(3)/MCL (ref 130–400)
PLATELET # BLD EST: ADEQUATE 10*3/UL
PMV BLD AUTO: 10.4 FL (ref 7.4–10.4)
POCT GLUCOSE: 114 MG/DL (ref 70–110)
POCT GLUCOSE: 135 MG/DL (ref 70–110)
POCT GLUCOSE: 94 MG/DL (ref 70–110)
POIKILOCYTOSIS BLD QL SMEAR: SLIGHT
POLYCHROMASIA BLD QL SMEAR: ABNORMAL
RBC # BLD AUTO: 3.21 X10(6)/MCL (ref 4.7–6.1)
RBC MORPH BLD: ABNORMAL
WBC # SPEC AUTO: 6.3 X10(3)/MCL (ref 4.5–11.5)

## 2022-10-10 PROCEDURE — 25000003 PHARM REV CODE 250: Performed by: STUDENT IN AN ORGANIZED HEALTH CARE EDUCATION/TRAINING PROGRAM

## 2022-10-10 PROCEDURE — 36415 COLL VENOUS BLD VENIPUNCTURE: CPT | Performed by: STUDENT IN AN ORGANIZED HEALTH CARE EDUCATION/TRAINING PROGRAM

## 2022-10-10 PROCEDURE — 85025 COMPLETE CBC W/AUTO DIFF WBC: CPT

## 2022-10-10 PROCEDURE — 36415 COLL VENOUS BLD VENIPUNCTURE: CPT

## 2022-10-10 PROCEDURE — G0378 HOSPITAL OBSERVATION PER HR: HCPCS

## 2022-10-10 PROCEDURE — 94761 N-INVAS EAR/PLS OXIMETRY MLT: CPT

## 2022-10-10 PROCEDURE — 25000003 PHARM REV CODE 250

## 2022-10-10 RX ORDER — POLYETHYLENE GLYCOL 3350, SODIUM SULFATE ANHYDROUS, SODIUM BICARBONATE, SODIUM CHLORIDE, POTASSIUM CHLORIDE 236; 22.74; 6.74; 5.86; 2.97 G/4L; G/4L; G/4L; G/4L; G/4L
2000 POWDER, FOR SOLUTION ORAL
Status: COMPLETED | OUTPATIENT
Start: 2022-10-11 | End: 2022-10-12

## 2022-10-10 RX ADMIN — ATORVASTATIN CALCIUM 40 MG: 40 TABLET, FILM COATED ORAL at 09:10

## 2022-10-10 RX ADMIN — ASPIRIN 81 MG: 81 TABLET, COATED ORAL at 09:10

## 2022-10-10 RX ADMIN — CLOPIDOGREL 75 MG: 75 TABLET, FILM COATED ORAL at 09:10

## 2022-10-10 RX ADMIN — METOPROLOL SUCCINATE 50 MG: 50 TABLET, FILM COATED, EXTENDED RELEASE ORAL at 09:10

## 2022-10-10 RX ADMIN — PANTOPRAZOLE SODIUM 40 MG: 40 TABLET, DELAYED RELEASE ORAL at 09:10

## 2022-10-10 RX ADMIN — TAMSULOSIN HYDROCHLORIDE 0.4 MG: 0.4 CAPSULE ORAL at 09:10

## 2022-10-10 NOTE — PROGRESS NOTES
[facility] Progress Note     Resident Team: [facility] Medicine List         Subjective:      Brief HPI:  Burton Vasquez is a 56 y.o. male who with a history of HTN, HLD, NSTEMI, normocytic anemia, stroke adenocarcinoma of prostate, who presented to [facility] ED on 10/9/2022  with a primary complaint of Shortness of Breath and Chest Pain. On 10/03, pt reports of having excessive hematochezia during the day and another episode of hematochezia and melena 10/06. Since 10/06, he felt SOB at rest which was not improving and even worse w/exertion. He denies taking any new medications, NSAIDs, or prior episodes of bloody and tarry black stools in the past.     Interval History:   Nurse reports of acute overnight events. Nurse reports of form mildly dark stool after pt's bowel movement. The pt currently has no complaints and was comfortably lying on the bed. Pt denies fever, chills, lightheadedness, fatigue, chest pain, palpitations, SOB, n/v, diarrhea, constipation, hematuria, hematochezia.     Review of Systems:  - see interval history     Objective:     Last 24 Hour Vital Signs:  BP  Min: 120/78  Max: 152/90  Temp  Av.4 °F (36.9 °C)  Min: 97.6 °F (36.4 °C)  Max: 99.6 °F (37.6 °C)  Pulse  Av.7  Min: 76  Max: 105  Resp  Av  Min: 18  Max: 18  SpO2  Av.2 %  Min: 95 %  Max: 100 %  Weight  Av.3 kg (210 lb 1.6 oz)  Min: 95.3 kg (210 lb 1.6 oz)  Max: 95.3 kg (210 lb 1.6 oz)  I/O last 3 completed shifts:  In: 150 [Blood:150]  Out: -     Physical Examination:  Physical Exam  Vitals and nursing note reviewed.   Constitutional:       General: He is not in acute distress.     Appearance: Normal appearance.   HENT:      Head: Normocephalic and atraumatic.      Mouth/Throat:      Mouth: Mucous membranes are moist.   Eyes:      Extraocular Movements: Extraocular movements intact.      Conjunctiva/sclera: Conjunctivae normal.      Pupils: Pupils are equal, round, and reactive to light.   Cardiovascular:      Rate and Rhythm:  Normal rate and regular rhythm.      Pulses: Normal pulses.      Heart sounds: Normal heart sounds. No murmur heard.  Pulmonary:      Effort: Pulmonary effort is normal. No respiratory distress.      Breath sounds: Normal breath sounds. No wheezing.   Abdominal:      General: Abdomen is flat. There is no distension.      Palpations: Abdomen is soft.      Tenderness: There is no abdominal tenderness.   Musculoskeletal:         General: Normal range of motion.      Cervical back: Normal range of motion and neck supple.   Skin:     General: Skin is warm.      Capillary Refill: Capillary refill takes less than 2 seconds.   Neurological:      General: No focal deficit present.      Mental Status: He is alert and oriented to person, place, and time.   Psychiatric:         Mood and Affect: Mood normal.         Laboratory:  Most Recent Data:  CBC:   Lab Results   Component Value Date    WBC 6.3 10/10/2022    HGB 8.2 (L) 10/10/2022    HCT 25.5 (L) 10/10/2022     10/10/2022    MCV 79.4 (L) 10/10/2022    RDW 15.9 10/10/2022     WBC Differential:   Recent Labs   Lab 10/09/22  0830 10/09/22  1009 10/10/22  0407   WBC 5.1 5.3 6.3   HGB 6.4* 6.3* 8.2*   HCT 20.1* 20.2* 25.5*    129* 135   MCV 78.2* 79.5* 79.4*     BMP:   Lab Results   Component Value Date     10/09/2022    K 4.3 10/09/2022    CO2 24 10/09/2022    BUN 13.2 10/09/2022    CREATININE 1.12 10/09/2022    CALCIUM 9.2 10/09/2022    MG 2.00 10/21/2021    PHOS 4.7 10/21/2021     LFTs:   Lab Results   Component Value Date    ALBUMIN 3.6 10/09/2022    BILITOT 0.6 10/09/2022    AST 17 10/09/2022    ALKPHOS 99 10/09/2022    ALT 17 10/09/2022     Coags:   Lab Results   Component Value Date    INR 1.01 12/07/2021    PROTIME 13.1 12/07/2021    PTT 31.7 12/07/2021     FLP:   Lab Results   Component Value Date    CHOL 218 (H) 09/07/2022    HDL 54 09/07/2022    TRIG 157 (H) 09/07/2022     DM:   Lab Results   Component Value Date    HGBA1C 6.0 10/21/2021    HGBA1C  6.1 10/01/2021    CREATININE 1.12 10/09/2022     Thyroid:   Lab Results   Component Value Date    TSH 0.4870 10/01/2021      Anemia:   Lab Results   Component Value Date    IRON 17 (L) 10/09/2022    TIBC 413 10/09/2022    FERRITIN 10.27 (L) 10/09/2022       Lab Results   Component Value Date    LRUIMNHU21 779 10/09/2022       Lab Results   Component Value Date    FOLATE 14.1 10/09/2022        Cardiac:   Lab Results   Component Value Date    TROPONINI <0.010 10/09/2022    BNP 25.6 10/09/2022       Radiology:  Imaging Results              X-Ray Chest 1 View (Final result)  Result time 10/09/22 09:25:16      Final result by Donna Gan MD (10/09/22 09:25:16)                   Impression:      Lung volumes are low with associated atelectatic change.      Electronically signed by: Donna Gan  Date:    10/09/2022  Time:    09:25               Narrative:    EXAMINATION:  XR CHEST 1 VIEW    CLINICAL HISTORY:  Dyspnea;    TECHNIQUE:  Single frontal view of the chest was performed.    COMPARISON:  10/12/2021    FINDINGS:  LINES AND TUBES: EKG/telemetry leads overlie the chest.    MEDIASTINUM AND SHAN: The cardiac silhouette is normal.    LUNGS: Lung volumes are low with associated atelectatic change.    PLEURA:No pleural effusion. No pneumothorax.    BONES: No acute osseous abnormality.                                      Current Medications:     Infusions:       Scheduled:   aspirin  81 mg Oral Daily    atorvastatin  40 mg Oral Daily    clopidogreL  75 mg Oral Daily    enoxaparin  40 mg Subcutaneous Daily    metoprolol succinate  50 mg Oral Daily    pantoprazole  40 mg Oral Daily    tamsulosin  0.4 mg Oral Daily        PRN:  dextrose 10 % in water (D10W), dextrose 10 % in water (D10W), glucagon (human recombinant), glucose, glucose, insulin aspart U-100      Assessment & Plan:     Anemia requiring transfusion  H/H 6.4/20.1 upon admission; 8.2/25.5 this AM after 2 units of pRBC  He states he had a colonoscopy  done in 12/29/2016 that was poor prep and recommended repeat in 5/10 years and EGD in 03/29/2017 that was significant for esophagitis/gastritis.   Ordered folate, B12, Iron, TIBC, FOBT, blood smear; Iron 17, TIBC 396, Ferritin 10.27, FOBT +  Placed consult to Gastroenterology for possible EGD vs output f/u; appreciate recommendations     HTN  Started on home regiment: metoprolol 50 mg qd  BP's 130s/70s; will cont. To monitor     Adenocarcinoma of Prostate (09/29/2020)  Follows Dr. Katz; last seen on 08/13/22   Restarted home Flomax 0.4 mg qd     Stroke 10/2021  Restarted home regiment ASA 81 mg qd and Plavix 75 mg qd     HLD  Restart home regiment Lipitor 40 mg qd    Access: pIV  Diet: adult reg  DVT Prophylaxis: Lovenox 40 mg subq  GI Prophylaxis: none  Fluids: none      Disposition: awaiting GI recommendations      Sky Montgomery MD     [facility] Resident HO-1  10/10/2022

## 2022-10-10 NOTE — PLAN OF CARE
Problem: Adult Inpatient Plan of Care  Goal: Plan of Care Review  Outcome: Ongoing, Progressing  Goal: Patient-Specific Goal (Individualized)  Outcome: Ongoing, Progressing  Goal: Absence of Hospital-Acquired Illness or Injury  Outcome: Ongoing, Progressing  Goal: Optimal Comfort and Wellbeing  Outcome: Ongoing, Progressing  Goal: Readiness for Transition of Care  Outcome: Ongoing, Progressing     Problem: Fatigue  Goal: Improved Activity Tolerance  Outcome: Ongoing, Progressing     Problem: Anemia  Goal: Anemia Symptom Improvement  Outcome: Ongoing, Progressing     Problem: Diabetes Comorbidity  Goal: Blood Glucose Level Within Targeted Range  Outcome: Ongoing, Progressing

## 2022-10-10 NOTE — CONSULTS
Gastroenterology/Hepatology Initial Consult Note    Patient Name: Burton Vasquez  Age: 56 y.o.  : 1965  MRN: [id number]  Admission Date: 10/9/2022    Reason for Consult:      Medical Management  Chief Complaint   Patient presents with    Shortness of Breath    Chest Pain     C/o sob, midsternal chest pain since Friday. O2 sats 100%         Self, Aaareferral    HPI:     Burton Vasquez is a 56 y.o. male history of HTN, HLD, CAD, DM, NSTEMI, normocytic anemia, stroke, adenocarcinoma of prostate  s/p radiation/chemotherapy presents to ED with c/o dyspnea on exertion and chest pain.     ED course: VS: /85, , RR 20, T 97.5, SpO2 100%. Labs: WBC 5.1, RBC 2.57, H & H 6.4/20.1, Plt 164, Glucose 195, , ESR 4, CRP 1.2. CXR Lung volumes are low with associated atelectatic change.    GI service consult for hematochezia/melena and anemia requiring transfusion.     He started feeling weak, dizzy and having SOB with chest pain to the center of his chest 5 days ago. At that time he also had a black stool. Two days later he had another black stool. He denies having previous episodes of melena. He denies hematochezia. He typically has 1-2 bowel movements daily that are usually formed and light brown to brown in color. His last bowel movement was last night and he describes his stool as black and formed. He states that he has had the urge to have a bowel movement but was unable to produce a bowel movement. He denies abdominal pain, nausea, vomiting, diarrhea.     Admits taking Ibuprofen twice weekly as needed for body aches. Admits to drinking alcohol every now and then, socially. Denies illicit drug use. Smokes cigarettes 1/2 ppd. Denies a family history of IBD and colon polyps. Dad had unknown type of cancer.     Previous endoscopies:     EGD done on 3/29/2017 for hx of esophagitis and gastritis per Dr. Blake Harmon: The proximal midportion of the esophagus was benign in appearance; however, at the distal  portion of the esophagus there were erosive changes seen at the lower esophageal sphincter and extending up to approximately 1 cm, but did not resemble any appearance of Porter's.  There was also significant scar tissue identified in this distal portion around the lower esophageal sphincter with a relaxed lower esophageal sphincter as well.  The scope was then advanced into the stomach where linear antral erythema was identified extending into the pylorus. There were submucosal hemorrhages seen extending from the 1st, 2nd and 3rd portions of the duodenal but no polyps, masses, or any other abnormalities identified.  The incisura did not have any erosions or ulcers or any lesions identified.  There was the appearance of a slight hiatal hernia; however, it could be due to the relaxed lower esophageal sphincter.  This made it difficult to insufflate the entire stomach to allow for proper distention of the stomach.     EGD done on 12/29/2016 for epigastric pain per Dr. Garcia: Patient's esophageal body was noted to be normal except for the distal esophagus which was demonstrated some circumferential esophagitis.  Multiple biopsies were obtained.  Lesion was concerning for Porter's, although there was overlying inflammation so it was difficult to evaluate.  The scope was then passed into the stomach and there appeared to be some gastritis, inflammation and erythema in the body of the stomach.  Biopsies were obtained here.  At this point, pylorus was patent, which was cannulated and scope was advanced into the first and second portions of the duodenum, which also appeared normal.  Esophageal biopsy: active esophagitis with reactive epithelial atypia. No dysplasia identified. Body of stomach biopsy: mild chronic gastritis. No dysplasia identified.     Colonoscopy done on 12/29/2016 for blood per rectum per Dr. Garcia: Patient was noted to have very poor prep which limited our visualization.  Ileocecal valve and  appendiceal orifice were identified.  We slowly withdrew the scope attempting to evaluate the colon in its entirety, which was very difficult, again, that patient had poor prep and it was almost impossible to identify any kind of small polyp that would have occurred. The patient was also noted to have an anal fissure.    Primary Doctor No    Past Medical History:   Diagnosis Date    Hypertension     Prostate cancer     Stroke         Past Surgical History:   Procedure Laterality Date    COLONOSCOPY  12/29/2016        Family History   Problem Relation Age of Onset    Hypertension Mother     Kidney failure Mother     Bone cancer Father        Social History     Tobacco Use    Smoking status: Former     Packs/day: 0.50     Types: Cigarettes    Smokeless tobacco: Never   Substance Use Topics    Alcohol use: Not Currently         Review of patient's allergies indicates:  No Known Allergies     Medications Prior to Admission   Medication Sig Dispense Refill Last Dose    alprostadiL (MUSE) 250 MCG pellet Place 250 mcg into the urethra as needed.       aspirin (ECOTRIN) 81 MG EC tablet Take 81 mg by mouth once daily.       atorvastatin (LIPITOR) 40 MG tablet Take 1 tablet (40 mg total) by mouth once daily. 90 tablet 2     clopidogreL (PLAVIX) 75 mg tablet Take 1 tablet (75 mg total) by mouth once daily. 90 tablet 2     ergocalciferol (ERGOCALCIFEROL) 50,000 unit Cap Take 50,000 Int'l Units by mouth.       metoprolol succinate (TOPROL-XL) 50 MG 24 hr tablet Take 1 tablet (50 mg total) by mouth once daily. 90 tablet 3     tamsulosin (FLOMAX) 0.4 mg Cap   See Instructions, TAKE ONE CAPSULE BY MOUTH once a day, # 30 cap(s), 1 Refill(s), Pharmacy: [facility], 172, cm, Height/Length Dosing, 01/07/22 12:44:00 CST, 93.8, kg, Weight Dosing, 01/07/22 12:44:00 CST       tamsulosin (FLOMAX) 0.4 mg Cap Take 1 capsule by mouth once daily.            INPATIENT MEDICATIONS    Scheduled Meds:   aspirin  81 mg Oral Daily     atorvastatin  40 mg Oral Daily    clopidogreL  75 mg Oral Daily    enoxaparin  40 mg Subcutaneous Daily    metoprolol succinate  50 mg Oral Daily    pantoprazole  40 mg Oral Daily    tamsulosin  0.4 mg Oral Daily     Continuous Infusions:  PRN Meds:  dextrose 10 % in water (D10W), dextrose 10 % in water (D10W), glucagon (human recombinant), glucose, glucose, insulin aspart U-100          Review of Systems:       Review of Systems   Constitutional:  Negative for appetite change, chills, fatigue and fever.   HENT:  Negative for trouble swallowing.    Respiratory:  Positive for shortness of breath (on admit, resolved with transfusion).    Cardiovascular:  Positive for chest pain (over center of chest on admit; denies at present).   Gastrointestinal:  Positive for blood in stool (black stools). Negative for abdominal distention, abdominal pain, anal bleeding, change in bowel habit, constipation, diarrhea, nausea, rectal pain, vomiting, reflux, fecal incontinence and change in bowel habit.   Genitourinary:  Negative for dysuria, frequency, hematuria and urgency.   Integumentary:  Negative for pallor.   Neurological:  Positive for dizziness (on admit, resolved with transfusion) and weakness (on admit, resolved with transfusion).        Objective:       VITAL SIGNS: 24 HR MIN & MAX LAST    Temp  Min: 97.6 °F (36.4 °C)  Max: 99.6 °F (37.6 °C)  97.6 °F (36.4 °C)        BP  Min: 120/78  Max: 152/90  139/84     Pulse  Min: 88  Max: 105  89     Resp  Min: 18  Max: 18  18    SpO2  Min: 95 %  Max: 100 %  100 %        Physical Exam  Constitutional:       General: He is awake. He is not in acute distress.  Eyes:      General: No scleral icterus.     Extraocular Movements: Extraocular movements intact.   Cardiovascular:      Rate and Rhythm: Normal rate and regular rhythm.      Heart sounds: Murmur (3/6 DEBBY at apex) heard.   Pulmonary:      Effort: Pulmonary effort is normal. No respiratory distress.      Breath sounds: Normal  breath sounds.   Abdominal:      General: Bowel sounds are normal. There is no distension.      Palpations: Abdomen is soft. There is no mass.      Tenderness: There is no abdominal tenderness. There is no guarding or rebound.      Hernia: No hernia is present.   Musculoskeletal:         General: Normal range of motion.   Skin:     General: Skin is warm and dry.      Coloration: Skin is not jaundiced or pale.   Neurological:      General: No focal deficit present.      Mental Status: He is alert and oriented to person, place, and time.   Psychiatric:         Behavior: Behavior is cooperative.            LABS:      Recent Labs   Lab 10/09/22  0830 10/09/22  1009 10/10/22  0407   WBC 5.1 5.3 6.3   HGB 6.4* 6.3* 8.2*   HCT 20.1* 20.2* 25.5*    129* 135   MCV 78.2* 79.5* 79.4*       Recent Labs   Lab 10/09/22  0830 10/09/22  1009 10/10/22  0407   HGB 6.4* 6.3* 8.2*   HCT 20.1* 20.2* 25.5*        Recent Labs   Lab 10/09/22  0830      K 4.3   CO2 24   BUN 13.2   CREATININE 1.12   BILITOT 0.6   ALKPHOS 99   AST 17   ALT 17   LABPROT 6.4   ALBUMIN 3.6        No results for input(s): INR, PROTIME, PTT in the last 168 hours.     Recent Labs   Lab 10/09/22  0830   IRON 17*   FERRITIN 10.27*          IMAGING:   X-Ray Chest 1 View    Result Date: 10/9/2022  EXAMINATION: XR CHEST 1 VIEW CLINICAL HISTORY: Dyspnea; TECHNIQUE: Single frontal view of the chest was performed. COMPARISON: 10/12/2021 FINDINGS: LINES AND TUBES: EKG/telemetry leads overlie the chest. MEDIASTINUM AND SHAN: The cardiac silhouette is normal. LUNGS: Lung volumes are low with associated atelectatic change. PLEURA:No pleural effusion. No pneumothorax. BONES: No acute osseous abnormality.     Lung volumes are low with associated atelectatic change. Electronically signed by: Donna Gan Date:    10/09/2022 Time:    09:25        Assessment / Plan:     Acute GI bleed  -Plan for EGD and colonoscopy on Wednesday.  -Clear liquids starting  tomorrow.  -Start bowel prep tomorrow at 1800.  -NPO after MN tomorrow night for colonoscopy.   -Continue pantoprazole.   -Stop Plavix.     Anemia  -Hgb on admit 6.4--> 6.3--> 1 unit PRBCs--> 8.2 --> PRBCs  -Monitor Hgb post transfusion. Transfuse for Hgb <7.   -Recommend starting iron.

## 2022-10-11 LAB
BASOPHILS # BLD AUTO: 0.03 X10(3)/MCL (ref 0–0.2)
BASOPHILS NFR BLD AUTO: 0.4 %
EOSINOPHIL # BLD AUTO: 0.21 X10(3)/MCL (ref 0–0.9)
EOSINOPHIL NFR BLD AUTO: 3.1 %
ERYTHROCYTE [DISTWIDTH] IN BLOOD BY AUTOMATED COUNT: 16.2 % (ref 11.5–17)
HCT VFR BLD AUTO: 26.7 % (ref 42–52)
HGB BLD-MCNC: 8.7 GM/DL (ref 14–18)
IMM GRANULOCYTES # BLD AUTO: 0.04 X10(3)/MCL (ref 0–0.04)
IMM GRANULOCYTES NFR BLD AUTO: 0.6 %
LYMPHOCYTES # BLD AUTO: 1.73 X10(3)/MCL (ref 0.6–4.6)
LYMPHOCYTES NFR BLD AUTO: 25.6 %
MCH RBC QN AUTO: 26 PG (ref 27–31)
MCHC RBC AUTO-ENTMCNC: 32.6 MG/DL (ref 33–36)
MCV RBC AUTO: 79.9 FL (ref 80–94)
MONOCYTES # BLD AUTO: 0.49 X10(3)/MCL (ref 0.1–1.3)
MONOCYTES NFR BLD AUTO: 7.2 %
NEUTROPHILS # BLD AUTO: 4.3 X10(3)/MCL (ref 2.1–9.2)
NEUTROPHILS NFR BLD AUTO: 63.1 %
NRBC BLD AUTO-RTO: 0 %
PLATELET # BLD AUTO: 160 X10(3)/MCL (ref 130–400)
PMV BLD AUTO: 10.9 FL (ref 7.4–10.4)
POCT GLUCOSE: 121 MG/DL (ref 70–110)
POCT GLUCOSE: 126 MG/DL (ref 70–110)
POCT GLUCOSE: 154 MG/DL (ref 70–110)
RBC # BLD AUTO: 3.34 X10(6)/MCL (ref 4.7–6.1)
WBC # SPEC AUTO: 6.8 X10(3)/MCL (ref 4.5–11.5)

## 2022-10-11 PROCEDURE — 94761 N-INVAS EAR/PLS OXIMETRY MLT: CPT

## 2022-10-11 PROCEDURE — 25000003 PHARM REV CODE 250: Performed by: NURSE PRACTITIONER

## 2022-10-11 PROCEDURE — 96374 THER/PROPH/DIAG INJ IV PUSH: CPT

## 2022-10-11 PROCEDURE — 85025 COMPLETE CBC W/AUTO DIFF WBC: CPT

## 2022-10-11 PROCEDURE — 25000003 PHARM REV CODE 250: Performed by: STUDENT IN AN ORGANIZED HEALTH CARE EDUCATION/TRAINING PROGRAM

## 2022-10-11 PROCEDURE — G0378 HOSPITAL OBSERVATION PER HR: HCPCS

## 2022-10-11 PROCEDURE — 36415 COLL VENOUS BLD VENIPUNCTURE: CPT

## 2022-10-11 PROCEDURE — 25000003 PHARM REV CODE 250

## 2022-10-11 PROCEDURE — 63600175 PHARM REV CODE 636 W HCPCS: Performed by: NURSE PRACTITIONER

## 2022-10-11 RX ADMIN — PANTOPRAZOLE SODIUM 40 MG: 40 TABLET, DELAYED RELEASE ORAL at 08:10

## 2022-10-11 RX ADMIN — POLYETHYLENE GLYCOL 3350, SODIUM SULFATE ANHYDROUS, SODIUM BICARBONATE, SODIUM CHLORIDE, POTASSIUM CHLORIDE 2000 ML: 236; 22.74; 6.74; 5.86; 2.97 POWDER, FOR SOLUTION ORAL at 05:10

## 2022-10-11 RX ADMIN — ASPIRIN 81 MG: 81 TABLET, COATED ORAL at 08:10

## 2022-10-11 RX ADMIN — TAMSULOSIN HYDROCHLORIDE 0.4 MG: 0.4 CAPSULE ORAL at 08:10

## 2022-10-11 RX ADMIN — SODIUM CHLORIDE 125 MG: 9 INJECTION, SOLUTION INTRAVENOUS at 10:10

## 2022-10-11 RX ADMIN — ATORVASTATIN CALCIUM 40 MG: 40 TABLET, FILM COATED ORAL at 08:10

## 2022-10-11 RX ADMIN — METOPROLOL SUCCINATE 50 MG: 50 TABLET, FILM COATED, EXTENDED RELEASE ORAL at 08:10

## 2022-10-11 NOTE — PROGRESS NOTES
Gastroenterology/Hepatology Progress Note    Patient Name: Burton Vasquez  Age: 56 y.o.  : 1965  MRN: [id number]  Admission Date: 10/9/2022      Self, Aaareferral    SUBJECTIVE:     Last BM yesterday was formed and brown. He denies abdominal pain, nausea, vomiting, hematemesis and diarrhea. He is currently on a clear liquid diet for anticipation for EGD and colonoscopy in AM.        Review of patient's allergies indicates:  No Known Allergies       INPATIENT MEDICATIONS    Scheduled Meds:   aspirin  81 mg Oral Daily    atorvastatin  40 mg Oral Daily    metoprolol succinate  50 mg Oral Daily    pantoprazole  40 mg Oral Daily    polyethylene glycol  2,000 mL Oral Q12H    tamsulosin  0.4 mg Oral Daily     Continuous Infusions:  PRN Meds:  dextrose 10 % in water (D10W), dextrose 10 % in water (D10W), glucagon (human recombinant), glucose, glucose, insulin aspart U-100          Review of Systems:       Review of Systems   Constitutional:  Negative for appetite change, chills, fatigue and fever.   HENT:  Negative for trouble swallowing.    Respiratory:  Negative for shortness of breath.    Cardiovascular:  Negative for chest pain.   Gastrointestinal:  Positive for blood in stool (previously having melena; last BM yesterday formed and brown). Negative for abdominal distention, abdominal pain, anal bleeding, change in bowel habit, constipation, diarrhea, nausea, rectal pain, vomiting, reflux, fecal incontinence and change in bowel habit.   Genitourinary:  Negative for dysuria, frequency, hematuria and urgency.   Integumentary:  Negative for pallor.   Neurological:  Negative for dizziness and weakness.        Objective:       VITAL SIGNS: 24 HR MIN & MAX LAST    Temp  Min: 97.3 °F (36.3 °C)  Max: 98.4 °F (36.9 °C)  98.4 °F (36.9 °C)        BP  Min: 131/74  Max: 141/81  (!) 141/81     Pulse  Min: 81  Max: 89  81     Resp  Min: 18  Max: 28  (!) 28    SpO2  Min: 98 %  Max: 100 %  98 %        Physical Exam  Constitutional:        General: He is awake. He is not in acute distress.  Eyes:      General: No scleral icterus.     Extraocular Movements: Extraocular movements intact.   Cardiovascular:      Rate and Rhythm: Normal rate and regular rhythm.      Heart sounds: Murmur (3/6 DEBBY at apex) heard.   Pulmonary:      Effort: Pulmonary effort is normal. No respiratory distress.      Breath sounds: Normal breath sounds.   Abdominal:      General: Bowel sounds are normal. There is no distension.      Palpations: Abdomen is soft. There is no mass.      Tenderness: There is no abdominal tenderness. There is no guarding or rebound.      Hernia: No hernia is present.   Musculoskeletal:         General: Normal range of motion.   Skin:     General: Skin is warm and dry.      Coloration: Skin is not jaundiced or pale.   Neurological:      General: No focal deficit present.      Mental Status: He is alert and oriented to person, place, and time.   Psychiatric:         Behavior: Behavior is cooperative.            LABS:    Recent Labs   Lab 10/09/22  0830 10/09/22  1009 10/10/22  0407 10/11/22  0415   WBC 5.1 5.3 6.3 6.8   HGB 6.4* 6.3* 8.2* 8.7*   HCT 20.1* 20.2* 25.5* 26.7*    129* 135 160   MCV 78.2* 79.5* 79.4* 79.9*       Recent Labs   Lab 10/09/22  0830 10/09/22  1009 10/10/22  0407 10/11/22  0415   HGB 6.4* 6.3* 8.2* 8.7*   HCT 20.1* 20.2* 25.5* 26.7*        Recent Labs   Lab 10/09/22  0830      K 4.3   CO2 24   BUN 13.2   CREATININE 1.12   BILITOT 0.6   ALKPHOS 99   AST 17   ALT 17   LABPROT 6.4   ALBUMIN 3.6        No results for input(s): INR, PROTIME, PTT in the last 168 hours.     Recent Labs   Lab 10/09/22  0830   IRON 17*   FERRITIN 10.27*            IMAGING:   X-Ray Chest 1 View    Result Date: 10/9/2022  EXAMINATION: XR CHEST 1 VIEW CLINICAL HISTORY: Dyspnea; TECHNIQUE: Single frontal view of the chest was performed. COMPARISON: 10/12/2021 FINDINGS: LINES AND TUBES: EKG/telemetry leads overlie the chest. MEDIASTINUM AND  SHAN: The cardiac silhouette is normal. LUNGS: Lung volumes are low with associated atelectatic change. PLEURA:No pleural effusion. No pneumothorax. BONES: No acute osseous abnormality.     Lung volumes are low with associated atelectatic change. Electronically signed by: Donna Gan Date:    10/09/2022 Time:    09:25        Assessment / Plan:     Acute GI bleed  -Plan for EGD and colonoscopy tomorrow.  -Clear liquids today.  -Start bowel prep today at 1800.  -NPO after MN tonight for colonoscopy.   -Continue supportive care.     Anemia  -Hgb on admit 6.4--> 6.3--> 1 unit PRBCs--> 8.2 -->  1 unit PRBCs--> 8.7  -Monitor Hgb. Transfuse for Hgb <7.   -Will start IV iron while inpatient.

## 2022-10-11 NOTE — PLAN OF CARE
Problem: Adult Inpatient Plan of Care  Goal: Plan of Care Review  Outcome: Ongoing, Progressing  Flowsheets (Taken 10/11/2022 0526)  Plan of Care Reviewed With: patient  Goal: Patient-Specific Goal (Individualized)  Outcome: Ongoing, Progressing  Goal: Absence of Hospital-Acquired Illness or Injury  Outcome: Ongoing, Progressing  Intervention: Identify and Manage Fall Risk  Flowsheets (Taken 10/11/2022 0526)  Safety Promotion/Fall Prevention:   assistive device/personal item within reach   side rails raised x 2  Goal: Optimal Comfort and Wellbeing  Outcome: Ongoing, Progressing  Goal: Readiness for Transition of Care  Outcome: Ongoing, Progressing

## 2022-10-11 NOTE — PROGRESS NOTES
[facility] Progress Note     Resident Team: [facility] Medicine List         Subjective:      Brief HPI:  Burton Vasquez is a 56 y.o. male who with a history of HTN, HLD, NSTEMI, normocytic anemia, stroke adenocarcinoma of prostate, who presented to [facility] ED on 10/9/2022  with a primary complaint of Shortness of Breath and Chest Pain. On 10/03, pt reports of having excessive hematochezia during the day and another episode of hematochezia and melena 10/06. Since 10/06, he felt SOB at rest which was not improving and even worse w/exertion. He denies taking any new medications, NSAIDs, or prior episodes of bloody and tarry black stools in the past.     Interval History:   No complaints overnight he is aware that he has EGD as well as colonoscopy tomorrow he is currently on clear liquids and will be starting prepped shortly.  Patient had 1 bowel movement yesterday that was without blood.  Pt denies fever, chills, lightheadedness, fatigue, chest pain, palpitations, SOB, n/v, diarrhea, constipation, hematuria, hematochezia.     Review of Systems:  - see interval history     Objective:     Last 24 Hour Vital Signs:  BP  Min: 131/74  Max: 141/81  Temp  Av °F (36.7 °C)  Min: 97.3 °F (36.3 °C)  Max: 98.4 °F (36.9 °C)  Pulse  Av.6  Min: 81  Max: 88  Resp  Av  Min: 18  Max: 28  SpO2  Av.2 %  Min: 98 %  Max: 100 %  No intake/output data recorded.    Physical Examination:  General: Awake, alert, & oriented to person, place & time. No acute distress  Psychiatric: Mood and affect normal  HEENT: Normocephalic, atraumatic. Face symmetric.  Cardiovascular: Regular rate & rhythm. Normal S1 & S2 w/out murmurs, rubs or gallops.  No JVD appreciated.  2+ pulses throughout.  Pulmonary: Bilateral symmetric chest rise. Non-labored, no wheezes, rhonchi or crackles are appreciated.  Abdominal:  Soft, nontender, nondistended. Bowel sounds present  Extremities: No clubbing, cyanosis or edema.  Skin:  Exposed skin is warm & dry.  Neuro:    Patient moves all extremities equally. Sensation intact bilateraly.        Laboratory:    Assessment & Plan:     Anemia requiring transfusion  H/H 6.4/20.1 upon admission; 8.2/25.5 this AM after 2 units of pRBC  He states he had a colonoscopy done in 12/29/2016 that was poor prep and recommended repeat in 5/10 years and EGD in 03/29/2017 that was significant for esophagitis/gastritis.   Ordered folate, B12, Iron, TIBC, FOBT, blood smear; Iron 17, TIBC 396, Ferritin 10.27, FOBT +  EGD and colonoscopy planned for tomorrow  Plavix has been held will hold aspirin as well     HTN  Started on home regiment: metoprolol 50 mg qd  BP's 130s/70s; will cont. To monitor     Adenocarcinoma of Prostate (09/29/2020)  Follows Dr. Katz; last seen on 08/13/22   Restarted home Flomax 0.4 mg qd     Stroke 10/2021  Restarted home regiment ASA 81 mg qd and Plavix 75 mg qd     HLD  Restart home regiment Lipitor 40 mg qd    Access: pIV  Diet: adult reg  DVT Prophylaxis: Lovenox 40 mg subq  GI Prophylaxis: none  Fluids: none      Disposition: awaiting GI recommendations      Rolo García MD  Internal Medicine - PGY-2      10/11/2022

## 2022-10-12 ENCOUNTER — ANESTHESIA EVENT (OUTPATIENT)
Dept: ENDOSCOPY | Facility: HOSPITAL | Age: 57
End: 2022-10-12
Payer: MEDICAID

## 2022-10-12 ENCOUNTER — ANESTHESIA (OUTPATIENT)
Dept: ENDOSCOPY | Facility: HOSPITAL | Age: 57
End: 2022-10-12
Payer: MEDICAID

## 2022-10-12 LAB
BASOPHILS # BLD AUTO: 0.02 X10(3)/MCL (ref 0–0.2)
BASOPHILS NFR BLD AUTO: 0.3 %
EOSINOPHIL # BLD AUTO: 0.2 X10(3)/MCL (ref 0–0.9)
EOSINOPHIL NFR BLD AUTO: 3.3 %
ERYTHROCYTE [DISTWIDTH] IN BLOOD BY AUTOMATED COUNT: 16 % (ref 11.5–17)
HCT VFR BLD AUTO: 27.9 % (ref 42–52)
HGB BLD-MCNC: 8.7 GM/DL (ref 14–18)
IMM GRANULOCYTES # BLD AUTO: 0.03 X10(3)/MCL (ref 0–0.04)
IMM GRANULOCYTES NFR BLD AUTO: 0.5 %
LYMPHOCYTES # BLD AUTO: 1.56 X10(3)/MCL (ref 0.6–4.6)
LYMPHOCYTES NFR BLD AUTO: 25.4 %
MCH RBC QN AUTO: 25.1 PG (ref 27–31)
MCHC RBC AUTO-ENTMCNC: 31.2 MG/DL (ref 33–36)
MCV RBC AUTO: 80.4 FL (ref 80–94)
MONOCYTES # BLD AUTO: 0.45 X10(3)/MCL (ref 0.1–1.3)
MONOCYTES NFR BLD AUTO: 7.3 %
NEUTROPHILS # BLD AUTO: 3.9 X10(3)/MCL (ref 2.1–9.2)
NEUTROPHILS NFR BLD AUTO: 63.2 %
NRBC BLD AUTO-RTO: 0 %
PLATELET # BLD AUTO: 163 X10(3)/MCL (ref 130–400)
PMV BLD AUTO: 10.5 FL (ref 7.4–10.4)
POCT GLUCOSE: 116 MG/DL (ref 70–110)
POCT GLUCOSE: 117 MG/DL (ref 70–110)
POCT GLUCOSE: 141 MG/DL (ref 70–110)
POCT GLUCOSE: 89 MG/DL (ref 70–110)
POCT GLUCOSE: 95 MG/DL (ref 70–110)
RBC # BLD AUTO: 3.47 X10(6)/MCL (ref 4.7–6.1)
WBC # SPEC AUTO: 6.2 X10(3)/MCL (ref 4.5–11.5)

## 2022-10-12 PROCEDURE — G0378 HOSPITAL OBSERVATION PER HR: HCPCS

## 2022-10-12 PROCEDURE — 63600175 PHARM REV CODE 636 W HCPCS: Performed by: NURSE ANESTHETIST, CERTIFIED REGISTERED

## 2022-10-12 PROCEDURE — 37000009 HC ANESTHESIA EA ADD 15 MINS: Performed by: INTERNAL MEDICINE

## 2022-10-12 PROCEDURE — 25000003 PHARM REV CODE 250: Performed by: NURSE ANESTHETIST, CERTIFIED REGISTERED

## 2022-10-12 PROCEDURE — 88305 TISSUE EXAM BY PATHOLOGIST: CPT | Performed by: INTERNAL MEDICINE

## 2022-10-12 PROCEDURE — 25000003 PHARM REV CODE 250: Performed by: INTERNAL MEDICINE

## 2022-10-12 PROCEDURE — 25000003 PHARM REV CODE 250: Performed by: NURSE PRACTITIONER

## 2022-10-12 PROCEDURE — 37000008 HC ANESTHESIA 1ST 15 MINUTES: Performed by: INTERNAL MEDICINE

## 2022-10-12 PROCEDURE — 36415 COLL VENOUS BLD VENIPUNCTURE: CPT

## 2022-10-12 PROCEDURE — 94761 N-INVAS EAR/PLS OXIMETRY MLT: CPT

## 2022-10-12 PROCEDURE — 43239 EGD BIOPSY SINGLE/MULTIPLE: CPT | Performed by: INTERNAL MEDICINE

## 2022-10-12 PROCEDURE — 27201423 OPTIME MED/SURG SUP & DEVICES STERILE SUPPLY: Performed by: INTERNAL MEDICINE

## 2022-10-12 PROCEDURE — 85025 COMPLETE CBC W/AUTO DIFF WBC: CPT

## 2022-10-12 PROCEDURE — 00813 ANES UPR LWR GI NDSC PX: CPT | Performed by: INTERNAL MEDICINE

## 2022-10-12 PROCEDURE — 63600175 PHARM REV CODE 636 W HCPCS: Performed by: SPECIALIST

## 2022-10-12 PROCEDURE — 45378 DIAGNOSTIC COLONOSCOPY: CPT | Performed by: INTERNAL MEDICINE

## 2022-10-12 PROCEDURE — 25000003 PHARM REV CODE 250

## 2022-10-12 PROCEDURE — 96361 HYDRATE IV INFUSION ADD-ON: CPT | Mod: 59

## 2022-10-12 RX ORDER — SUCRALFATE 1 G/10ML
1 SUSPENSION ORAL 2 TIMES DAILY
Qty: 280 ML | Refills: 0 | Status: SHIPPED | OUTPATIENT
Start: 2022-10-12 | End: 2022-10-26

## 2022-10-12 RX ORDER — SODIUM CHLORIDE, SODIUM LACTATE, POTASSIUM CHLORIDE, CALCIUM CHLORIDE 600; 310; 30; 20 MG/100ML; MG/100ML; MG/100ML; MG/100ML
INJECTION, SOLUTION INTRAVENOUS CONTINUOUS
Status: DISCONTINUED | OUTPATIENT
Start: 2022-10-12 | End: 2022-10-12

## 2022-10-12 RX ORDER — LIDOCAINE HYDROCHLORIDE 20 MG/ML
INJECTION INTRAVENOUS
Status: DISCONTINUED | OUTPATIENT
Start: 2022-10-12 | End: 2022-10-12

## 2022-10-12 RX ORDER — SODIUM CHLORIDE, SODIUM LACTATE, POTASSIUM CHLORIDE, CALCIUM CHLORIDE 600; 310; 30; 20 MG/100ML; MG/100ML; MG/100ML; MG/100ML
INJECTION, SOLUTION INTRAVENOUS CONTINUOUS
Status: DISCONTINUED | OUTPATIENT
Start: 2022-10-12 | End: 2022-10-12 | Stop reason: CLARIF

## 2022-10-12 RX ORDER — PANTOPRAZOLE SODIUM 40 MG/1
40 TABLET, DELAYED RELEASE ORAL DAILY
Qty: 30 TABLET | Refills: 11 | Status: SHIPPED | OUTPATIENT
Start: 2022-10-13 | End: 2023-10-13

## 2022-10-12 RX ORDER — PROPOFOL 10 MG/ML
VIAL (ML) INTRAVENOUS
Status: DISCONTINUED | OUTPATIENT
Start: 2022-10-12 | End: 2022-10-12

## 2022-10-12 RX ORDER — FERROUS SULFATE 324(65)MG
324 TABLET, DELAYED RELEASE (ENTERIC COATED) ORAL EVERY OTHER DAY
Qty: 45 TABLET | Refills: 0 | Status: SHIPPED | OUTPATIENT
Start: 2022-10-12 | End: 2023-01-10

## 2022-10-12 RX ORDER — LIDOCAINE HYDROCHLORIDE 10 MG/ML
1 INJECTION, SOLUTION EPIDURAL; INFILTRATION; INTRACAUDAL; PERINEURAL ONCE
Status: DISCONTINUED | OUTPATIENT
Start: 2022-10-12 | End: 2022-10-12

## 2022-10-12 RX ADMIN — POLYETHYLENE GLYCOL 3350, SODIUM SULFATE ANHYDROUS, SODIUM BICARBONATE, SODIUM CHLORIDE, POTASSIUM CHLORIDE 2000 ML: 236; 22.74; 6.74; 5.86; 2.97 POWDER, FOR SOLUTION ORAL at 06:10

## 2022-10-12 RX ADMIN — PROPOFOL 130 MG: 10 INJECTION, EMULSION INTRAVENOUS at 01:10

## 2022-10-12 RX ADMIN — LIDOCAINE HYDROCHLORIDE 30 MG: 20 INJECTION, SOLUTION INTRAVENOUS at 01:10

## 2022-10-12 RX ADMIN — TAMSULOSIN HYDROCHLORIDE 0.4 MG: 0.4 CAPSULE ORAL at 02:10

## 2022-10-12 RX ADMIN — PROPOFOL 175 MCG/KG/MIN: 10 INJECTION, EMULSION INTRAVENOUS at 01:10

## 2022-10-12 RX ADMIN — SODIUM CHLORIDE, POTASSIUM CHLORIDE, SODIUM LACTATE AND CALCIUM CHLORIDE: 600; 310; 30; 20 INJECTION, SOLUTION INTRAVENOUS at 01:10

## 2022-10-12 RX ADMIN — PANTOPRAZOLE SODIUM 40 MG: 40 TABLET, DELAYED RELEASE ORAL at 02:10

## 2022-10-12 RX ADMIN — ATORVASTATIN CALCIUM 40 MG: 40 TABLET, FILM COATED ORAL at 02:10

## 2022-10-12 RX ADMIN — METOPROLOL SUCCINATE 50 MG: 50 TABLET, FILM COATED, EXTENDED RELEASE ORAL at 08:10

## 2022-10-12 NOTE — ANESTHESIA PREPROCEDURE EVALUATION
10/12/2022  Burton Vasquez is a 56 y.o., male for CLN and EGD  History of GI hemorrhage and admit with symptomatic anemia (h/H of 6.4/20.1) --> now 8.2/25.5 post 2 u PRBC    PMHx of HTN (LD of BB @ 0820) , Hyperlipidemia, NSTEMI (medically managed) , CVA (daily ASA & Plavix) , Prostate AdenoCA (Sept 2020)           Vent. Rate : 105 BPM     Atrial Rate : 105 BPM      P-R Int : 170 ms          QRS Dur : 074 ms       QT Int : 342 ms       P-R-T Axes : 054 -05 006 degrees      QTc Int : 452 ms     Sinus tachycardia   Voltage criteria for left ventricular hypertrophy   Abnormal ECG   No previous ECGs available   Confirmed by Bonny Coelho MD ([id number]) on 10/10/2022 1:27:51 PM         Vitals:    10/12/22 0429 10/12/22 0732 10/12/22 0800 10/12/22 1123   BP: (!) 153/99 124/82  131/84   Pulse: 83 89  82   Resp:  18  18   Temp: 36.6 °C (97.9 °F) 37.1 °C (98.8 °F)  36.9 °C (98.5 °F)   TempSrc: Oral Oral  Oral   SpO2: 99% 100% 100% 100%   Weight:       Height:         Lab Results   Component Value Date    WBC 6.2 10/12/2022    HGB 8.7 (L) 10/12/2022    HCT 27.9 (L) 10/12/2022     10/12/2022    CHOL 218 (H) 09/07/2022    TRIG 157 (H) 09/07/2022    HDL 54 09/07/2022    ALT 17 10/09/2022    AST 17 10/09/2022     10/09/2022    K 4.3 10/09/2022    CREATININE 1.12 10/09/2022    BUN 13.2 10/09/2022    CO2 24 10/09/2022    TSH 0.4870 10/01/2021    PSA 0.84 08/19/2022    INR 1.01 12/07/2021    HGBA1C 6.0 10/21/2021     Pre-op Assessment    I have reviewed the Patient Summary Reports.     I have reviewed the Nursing Notes. I have reviewed the NPO Status.   I have reviewed the Medications.     Review of Systems  Anesthesia Hx:  No problems with previous Anesthesia  History of prior surgery of interest to airway management or planning: Denies Family Hx of Anesthesia complications.   Denies Personal Hx of Anesthesia  complications.   Social:  Non-Smoker    Hematology/Oncology:  Hematology Normal   Oncology Normal     EENT/Dental:EENT/Dental Normal   Cardiovascular:   Exercise tolerance: good Hypertension CAD   RICE    Pulmonary:   Denies COPD.  Denies Asthma.  Denies Shortness of breath.    Renal/:  Renal/ Normal     Hepatic/GI:  Hepatic/GI Normal    Musculoskeletal:  Musculoskeletal Normal    Neurological:   CVA, no residual symptoms Denies Headaches.    Endocrine:  Endocrine Normal    Dermatological:  Skin Normal    Psych:  Psychiatric Normal           Physical Exam  General: Well nourished, Cooperative, Alert and Oriented    Airway:  Mallampati: I / I  Mouth Opening: Normal  TM Distance: Normal  Tongue: Large, Normal  Neck ROM: Normal ROM    Dental:  Intact, Caps / Implants        Anesthesia Plan  Type of Anesthesia, risks & benefits discussed:    Anesthesia Type: Gen Natural Airway  Intra-op Monitoring Plan: Standard ASA Monitors  Post Op Pain Control Plan: IV/PO Opioids PRN  (medical reason for not using multimodal pain management)  Induction:  IV  Informed Consent: Informed consent signed with the Patient and all parties understand the risks and agree with anesthesia plan.  All questions answered. Patient consented to blood products? No  ASA Score: 3  Day of Surgery Review of History & Physical: H&P Update referred to the surgeon/provider.    Ready For Surgery From Anesthesia Perspective.     .

## 2022-10-12 NOTE — PROGRESS NOTES
Gastroenterology/Hepatology Progress Note    Patient Name: Burton Vasquez  Age: 56 y.o.  : 1965  MRN: [id number]  Admission Date: 10/9/2022      Self, Aaareferral    SUBJECTIVE:     Last BM yesterday was formed and brown. He is now having brown watery stools. He is in the process of completing bowel prep for EGD and colonoscopy for this AM. He denies abdominal pain, nausea, vomiting, hematemesis and diarrhea.       Review of patient's allergies indicates:  No Known Allergies       INPATIENT MEDICATIONS    Scheduled Meds:   atorvastatin  40 mg Oral Daily    ferric gluconate (FERRLECIT) IVPB  125 mg Intravenous Every other day    metoprolol succinate  50 mg Oral Daily    pantoprazole  40 mg Oral Daily    tamsulosin  0.4 mg Oral Daily     Continuous Infusions:  PRN Meds:  dextrose 10 % in water (D10W), dextrose 10 % in water (D10W), glucagon (human recombinant), glucose, glucose, insulin aspart U-100          Review of Systems:       Review of Systems   Constitutional:  Negative for appetite change, chills, fatigue and fever.   HENT:  Negative for trouble swallowing.    Respiratory:  Negative for shortness of breath.    Cardiovascular:  Negative for chest pain.   Gastrointestinal:  Positive for blood in stool (previously having melena; last BM yesterday formed and brown). Negative for abdominal distention, abdominal pain, anal bleeding, change in bowel habit, constipation, diarrhea, nausea, rectal pain, vomiting, reflux, fecal incontinence and change in bowel habit.   Genitourinary:  Negative for dysuria, frequency, hematuria and urgency.   Integumentary:  Negative for pallor.   Neurological:  Negative for dizziness and weakness.        Objective:       VITAL SIGNS: 24 HR MIN & MAX LAST    Temp  Min: 97.7 °F (36.5 °C)  Max: 98.8 °F (37.1 °C)  98.8 °F (37.1 °C)        BP  Min: 119/77  Max: 153/99  124/82     Pulse  Min: 82  Max: 92  89     Resp  Min: 18  Max: 20  18    SpO2  Min: 95 %  Max: 100 %  100 %         Physical Exam  Constitutional:       General: He is awake. He is not in acute distress.  Eyes:      General: No scleral icterus.     Extraocular Movements: Extraocular movements intact.   Cardiovascular:      Rate and Rhythm: Normal rate and regular rhythm.      Pulses: Normal pulses.      Heart sounds: Murmur (3/6 DEBBY at apex) heard.   Pulmonary:      Effort: Pulmonary effort is normal. No respiratory distress.      Breath sounds: Normal breath sounds.   Abdominal:      General: Bowel sounds are normal. There is no distension.      Palpations: Abdomen is soft. There is no mass.      Tenderness: There is no abdominal tenderness. There is no guarding or rebound.      Hernia: No hernia is present.   Musculoskeletal:         General: Normal range of motion.   Skin:     General: Skin is warm and dry.      Coloration: Skin is not jaundiced or pale.   Neurological:      General: No focal deficit present.      Mental Status: He is alert and oriented to person, place, and time.   Psychiatric:         Behavior: Behavior is cooperative.            LABS:    Recent Labs   Lab 10/09/22  0830 10/09/22  1009 10/10/22  0407 10/11/22  0415 10/12/22  0312   WBC 5.1 5.3 6.3 6.8 6.2   HGB 6.4* 6.3* 8.2* 8.7* 8.7*   HCT 20.1* 20.2* 25.5* 26.7* 27.9*    129* 135 160 163   MCV 78.2* 79.5* 79.4* 79.9* 80.4       Recent Labs   Lab 10/09/22  0830 10/09/22  1009 10/10/22  0407 10/11/22  0415 10/12/22  0312   HGB 6.4* 6.3* 8.2* 8.7* 8.7*   HCT 20.1* 20.2* 25.5* 26.7* 27.9*        Recent Labs   Lab 10/09/22  0830      K 4.3   CO2 24   BUN 13.2   CREATININE 1.12   BILITOT 0.6   ALKPHOS 99   AST 17   ALT 17   LABPROT 6.4   ALBUMIN 3.6        No results for input(s): INR, PROTIME, PTT in the last 168 hours.     Recent Labs   Lab 10/09/22  0830   IRON 17*   FERRITIN 10.27*            IMAGING:   X-Ray Chest 1 View    Result Date: 10/9/2022  EXAMINATION: XR CHEST 1 VIEW CLINICAL HISTORY: Dyspnea; TECHNIQUE: Single frontal view of  the chest was performed. COMPARISON: 10/12/2021 FINDINGS: LINES AND TUBES: EKG/telemetry leads overlie the chest. MEDIASTINUM AND SHAN: The cardiac silhouette is normal. LUNGS: Lung volumes are low with associated atelectatic change. PLEURA:No pleural effusion. No pneumothorax. BONES: No acute osseous abnormality.     Lung volumes are low with associated atelectatic change. Electronically signed by: Donna Gan Date:    10/09/2022 Time:    09:25        Assessment / Plan:     Acute GI bleed  -Plan for EGD and colonoscopy today.  -NPO for procedures.    Anemia  -Hgb stable.  Hgb on admit 6.4--> 6.3--> 1 unit PRBCs--> 8.2 -->  1 unit PRBCs--> 8.7-->8.7  -Monitor Hgb. Transfuse for Hgb <7.   -Continue IV iron while inpatient.

## 2022-10-12 NOTE — PROVATION PATIENT INSTRUCTIONS
Discharge Summary/Instructions after an Endoscopic Procedure  Patient Name: Burton Vasquez  Patient MRN: [id number]  Patient YOB: 1965  Wednesday, October 12, 2022  Meggan Lester MD  Dear patient,  As a result of recent federal legislation (The Federal Cures Act), you may   receive lab or pathology results from your procedure in your [facility]   account before your physician is able to contact you. Your physician or   their representative will relay the results to you with their   recommendations at their soonest availability.  Thank you,  RESTRICTIONS:  During your procedure today, you received medications for sedation.  These   medications may affect your judgment, balance and coordination.  Therefore,   for 24 hours, you have the following restrictions:   - DO NOT drive a car, operate machinery, make legal/financial decisions,   sign important papers or drink alcohol.    ACTIVITY:  Today: no heavy lifting, straining or running due to procedural   sedation/anesthesia.  The following day: return to full activity including work.  DIET:  Eat and drink normally unless instructed otherwise.     TREATMENT FOR COMMON SIDE EFFECTS:  - Mild abdominal pain, nausea, belching, bloating or excessive gas:  rest,   eat lightly and use a heating pad.  - Sore Throat: treat with throat lozenges and/or gargle with warm salt   water.  - Because air was used during the procedure, expelling large amounts of air   from your rectum or belching is normal.  - If a bowel prep was taken, you may not have a bowel movement for 1-3 days.    This is normal.  SYMPTOMS TO WATCH FOR AND REPORT TO YOUR PHYSICIAN:  1. Abdominal pain or bloating, other than gas cramps.  2. Chest pain.  3. Back pain.  4. Signs of infection such as: chills or fever occurring within 24 hours   after the procedure.  5. Rectal bleeding, which would show as bright red, maroon, or black stools.   (A tablespoon of blood from the rectum is not serious,  especially if   hemorrhoids are present.)  6. Vomiting.  7. Weakness or dizziness.  GO DIRECTLY TO THE NEAREST EMERGENCY ROOM IF YOU HAVE ANY OF THE FOLLOWING:      Difficulty breathing              Chills and/or fever over 101 F   Persistent vomiting and/or vomiting blood   Severe abdominal pain   Severe chest pain   Black, tarry stools   Bleeding- more than one tablespoon   Any other symptom or condition that you feel may need urgent attention  Your doctor recommends these additional instructions:  If any biopsies were taken, your doctors clinic will contact you in 1 to 2   weeks with any results.  - [facility] patient to [facility] for ongoing care.   - Advance diet as tolerated.   - Use Protonix (pantoprazole) 40 mg PO daily.   - Use sucralfate suspension 1 gram PO BID for 2 weeks.   - Continue present medications.   - Recommend an iron supplement.  For questions, problems or results please call your physician - Meggan Lester MD at Work:  [phone].  [facility] , EMERGENCY ROOM PHONE NUMBER: [phone]  IF A COMPLICATION OR EMERGENCY SITUATION ARISES AND YOU ARE UNABLE TO REACH   YOUR PHYSICIAN - GO DIRECTLY TO THE EMERGENCY ROOM.  MD Meggan Robbins MD  10/12/2022 2:31:59 PM  This report has been verified and signed electronically.  Dear patient,  As a result of recent federal legislation (The Federal Cures Act), you may   receive lab or pathology results from your procedure in your [facility]   account before your physician is able to contact you. Your physician or   their representative will relay the results to you with their   recommendations at their soonest availability.  Thank you,  PROVATION

## 2022-10-12 NOTE — PROVATION PATIENT INSTRUCTIONS
Discharge Summary/Instructions after an Endoscopic Procedure  Patient Name: Burton Vasquez  Patient MRN: [id number]  Patient YOB: 1965  Wednesday, October 12, 2022  Meggan Lester MD  Dear patient,  As a result of recent federal legislation (The Federal Cures Act), you may   receive lab or pathology results from your procedure in your [facility]   account before your physician is able to contact you. Your physician or   their representative will relay the results to you with their   recommendations at their soonest availability.  Thank you,  RESTRICTIONS:  During your procedure today, you received medications for sedation.  These   medications may affect your judgment, balance and coordination.  Therefore,   for 24 hours, you have the following restrictions:   - DO NOT drive a car, operate machinery, make legal/financial decisions,   sign important papers or drink alcohol.    ACTIVITY:  Today: no heavy lifting, straining or running due to procedural   sedation/anesthesia.  The following day: return to full activity including work.  DIET:  Eat and drink normally unless instructed otherwise.     TREATMENT FOR COMMON SIDE EFFECTS:  - Mild abdominal pain, nausea, belching, bloating or excessive gas:  rest,   eat lightly and use a heating pad.  - Sore Throat: treat with throat lozenges and/or gargle with warm salt   water.  - Because air was used during the procedure, expelling large amounts of air   from your rectum or belching is normal.  - If a bowel prep was taken, you may not have a bowel movement for 1-3 days.    This is normal.  SYMPTOMS TO WATCH FOR AND REPORT TO YOUR PHYSICIAN:  1. Abdominal pain or bloating, other than gas cramps.  2. Chest pain.  3. Back pain.  4. Signs of infection such as: chills or fever occurring within 24 hours   after the procedure.  5. Rectal bleeding, which would show as bright red, maroon, or black stools.   (A tablespoon of blood from the rectum is not serious,  especially if   hemorrhoids are present.)  6. Vomiting.  7. Weakness or dizziness.  GO DIRECTLY TO THE NEAREST EMERGENCY ROOM IF YOU HAVE ANY OF THE FOLLOWING:      Difficulty breathing              Chills and/or fever over 101 F   Persistent vomiting and/or vomiting blood   Severe abdominal pain   Severe chest pain   Black, tarry stools   Bleeding- more than one tablespoon   Any other symptom or condition that you feel may need urgent attention  Your doctor recommends these additional instructions:  If any biopsies were taken, your doctors clinic will contact you in 1 to 2   weeks with any results.  - Return patient to [facility] for ongoing care.   - Advance diet as tolerated.   - Continue present medications.   - Repeat colonoscopy in 10 years for screening purposes.   - Resume Plavix (clopidogrel) at prior dose today.  For questions, problems or results please call your physician - Meggan Lester MD at Work:  [phone].  [facility] , EMERGENCY ROOM PHONE NUMBER: [phone]  IF A COMPLICATION OR EMERGENCY SITUATION ARISES AND YOU ARE UNABLE TO REACH   YOUR PHYSICIAN - GO DIRECTLY TO THE EMERGENCY ROOM.  MD Meggan Robbins MD  10/12/2022 2:36:33 PM  This report has been verified and signed electronically.  Dear patient,  As a result of recent federal legislation (The Federal Cures Act), you may   receive lab or pathology results from your procedure in your [facility]   account before your physician is able to contact you. Your physician or   their representative will relay the results to you with their   recommendations at their soonest availability.  Thank you,  PROVATION

## 2022-10-12 NOTE — TRANSFER OF CARE
Anesthesia Transfer of Care Note    Patient: Burton Vasquez    Procedure(s) Performed: Procedure(s) (LRB):  COLONOSCOPY (Left)  EGD, WITH CLOSED BIOPSY (N/A)    Patient location: GI    Anesthesia Type: general    Transport from OR: Transported from OR on room air with adequate spontaneous ventilation    Post pain: adequate analgesia    Post assessment: no apparent anesthetic complications    Post vital signs: stable    Level of consciousness: awake    Nausea/Vomiting: no nausea/vomiting    Complications: none    Transfer of care protocol was followed

## 2022-10-12 NOTE — ANESTHESIA POSTPROCEDURE EVALUATION
Anesthesia Post Evaluation    Patient: Burton Vasquez    Procedure(s) Performed: Procedure(s) (LRB):  COLONOSCOPY (Left)  EGD, WITH CLOSED BIOPSY (N/A)    Final Anesthesia Type: general      Patient location during evaluation: GI PACU  Patient participation: Yes- Able to Participate  Level of consciousness: awake and alert  Post-procedure vital signs: reviewed and stable  Pain management: adequate  Airway patency: patent    PONV status at discharge: No PONV  Anesthetic complications: no      Cardiovascular status: stable  Respiratory status: spontaneous ventilation and unassisted  Hydration status: euvolemic  Follow-up not needed.

## 2022-10-12 NOTE — PROGRESS NOTES
[facility] Progress Note     Resident Team: [facility] Medicine List         Subjective:      Brief HPI:  Burton Vasquez is a 56 y.o. male who with a history of HTN, HLD, NSTEMI, normocytic anemia, stroke adenocarcinoma of prostate, who presented to [facility] ED on 10/9/2022  with a primary complaint of Shortness of Breath and Chest Pain. On 10/03, pt reports of having excessive hematochezia during the day and another episode of hematochezia and melena 10/06. Since 10/06, he felt SOB at rest which was not improving and even worse w/exertion. He denies taking any new medications, NSAIDs, or prior episodes of bloody and tarry black stools in the past.     Interval History:   No complaints overnight, patient was prepped yesterday for EGD this morning as well as colonoscopy this morning.  Will await results, if no concerning findings will likely be able to discharge today.    Review of Systems:  Pertinent positives and negatives listed in HPI. All other systems are reviewed and are negative.       Objective:     Last 24 Hour Vital Signs:  BP  Min: 119/77  Max: 153/99  Temp  Av.1 °F (36.7 °C)  Min: 97.7 °F (36.5 °C)  Max: 98.8 °F (37.1 °C)  Pulse  Av  Min: 82  Max: 92  Resp  Av.3  Min: 18  Max: 20  SpO2  Av.7 %  Min: 95 %  Max: 100 %  No intake/output data recorded.    Physical Examination:  General: Awake, alert, & oriented to person, place & time. No acute distress  Psychiatric: Mood and affect normal  HEENT: Normocephalic, atraumatic. Face symmetric.  Cardiovascular: Regular rate & rhythm. Normal S1 & S2 w/out murmurs, rubs or gallops.  No JVD appreciated.  2+ pulses throughout.  Pulmonary: Bilateral symmetric chest rise. Non-labored, no wheezes, rhonchi or crackles are appreciated.  Abdominal:  Soft, nontender, nondistended. Bowel sounds present  Extremities: No clubbing, cyanosis or edema.  Skin:  Exposed skin is warm & dry.  Neuro:   Patient moves all extremities equally. Sensation intact  bilateraly.        Laboratory:    Assessment & Plan:     Anemia requiring transfusion  H/H 6.4/20.1 upon admission; 8.2/25.5 this AM after 2 units of pRBC  He states he had a colonoscopy done in 12/29/2016 that was poor prep and recommended repeat in 5/10 years and EGD in 03/29/2017 that was significant for esophagitis/gastritis.   Ordered folate, B12, Iron, TIBC, FOBT, blood smear; Iron 17, TIBC 396, Ferritin 10.27, FOBT +  EGD and colonoscopy today  Plavix has been held will hold aspirin as well     HTN  Started on home regiment: metoprolol 50 mg qd  BP's 130s/70s; will cont. To monitor     Adenocarcinoma of Prostate (09/29/2020)  Follows Dr. Katz; last seen on 08/13/22   Restarted home Flomax 0.4 mg qd     Stroke 10/2021  Restarted home regiment ASA 81 mg qd and Plavix 75 mg qd     HLD  Restart home regiment Lipitor 40 mg qd    Access: pIV  Diet: adult reg  DVT Prophylaxis: Lovenox 40 mg subq  GI Prophylaxis: none  Fluids: none      Disposition:  Awaiting scope results provided no concerning findings patient likely able for discharge today      Rolo García MD  Internal Medicine - PGY-2      10/12/2022

## 2022-10-13 VITALS
HEART RATE: 78 BPM | HEIGHT: 68 IN | RESPIRATION RATE: 18 BRPM | TEMPERATURE: 97 F | SYSTOLIC BLOOD PRESSURE: 136 MMHG | WEIGHT: 210.13 LBS | DIASTOLIC BLOOD PRESSURE: 85 MMHG | BODY MASS INDEX: 31.85 KG/M2 | OXYGEN SATURATION: 94 %

## 2022-10-18 LAB
DHEA SERPL-MCNC: NORMAL
ESTROGEN SERPL-MCNC: NORMAL PG/ML
INSULIN SERPL-ACNC: NORMAL U[IU]/ML
LAB AP CLINICAL INFORMATION: NORMAL
LAB AP GROSS DESCRIPTION: NORMAL
LAB AP REPORT FOOTNOTES: NORMAL
T3RU NFR SERPL: NORMAL %

## 2022-10-31 ENCOUNTER — OFFICE VISIT (OUTPATIENT)
Dept: INTERNAL MEDICINE | Facility: CLINIC | Age: 57
End: 2022-10-31
Payer: MEDICAID

## 2022-10-31 VITALS
WEIGHT: 214.19 LBS | RESPIRATION RATE: 18 BRPM | SYSTOLIC BLOOD PRESSURE: 119 MMHG | BODY MASS INDEX: 32.46 KG/M2 | DIASTOLIC BLOOD PRESSURE: 71 MMHG | OXYGEN SATURATION: 99 % | HEART RATE: 81 BPM | TEMPERATURE: 98 F | HEIGHT: 68 IN

## 2022-10-31 DIAGNOSIS — K20.90 ESOPHAGITIS: Primary | ICD-10-CM

## 2022-10-31 DIAGNOSIS — K22.11 ESOPHAGEAL ULCER WITH BLEEDING: ICD-10-CM

## 2022-10-31 DIAGNOSIS — D64.9 SYMPTOMATIC ANEMIA: ICD-10-CM

## 2022-10-31 PROCEDURE — 99214 OFFICE O/P EST MOD 30 MIN: CPT | Mod: PBBFAC

## 2022-11-07 NOTE — DISCHARGE SUMMARY
[facility] Internal Medicine Discharge Summary    Admitting Physician: Sukhdev Solomon MD  Attending Physician: No att. providers found  Date of Admit: 10/9/2022  Date of Discharge: 11/7/2022    Condition: Good  Outcome: Patient tolerated treatment/procedure well without complication and is now ready for discharge.  DISPOSITION: Home or Self Care    Discharge Diagnoses     Patient Active Problem List   Diagnosis    Coronary artery disease involving native coronary artery of native heart without angina pectoris    Primary hypertension    Tobacco use    RICE (dyspnea on exertion)    Prostate CA    Anemia requiring transfusions    Esophagitis       Principal Problem:  Anemia requiring transfusions    Consultants and Procedures     Consultants:  IP CONSULT TO GI    Procedures:   Procedure(s) (LRB):  COLONOSCOPY (Left)  EGD, WITH CLOSED BIOPSY (N/A)     Brief Admission History      Burton Vasquez is a 56 y.o. male who with a history of HTN, HLD, NSTEMI, normocytic anemia, stroke adenocarcinoma of prostate, who presented to [facility] ED on 10/9/2022  with a primary complaint of Shortness of Breath and Chest Pain. On 10/03, pt reports of having excessive hematochezia during the day and another episode of hematochezia and melena 10/06. Since 10/06, he felt SOB at rest which was not improving and even worse w/exertion. He denies taking any new medications, NSAIDs, or prior episodes of bloody and tarry black stools in the past.     Hospital Course with Pertinent Findings     Patient was admitted to Hospital Medicine received 2 units pRBCs with improvement of H&H.  Lab results revealed iron deficiency anemia with low ferritin, normal B12 normal folate.  Decision was made to scope patient while inpatient, colonoscopy showed a normal colon up to the ileum, EGD showed 3 cm hiatal hernia with grade C reflux esophagitis as well as a nonbleeding esophageal ulcer which is awaiting biopsy.  Patient was also found to have gastritis, duodenum was  normal.  Patient was started on the recommendation provided by GI these medications are listed below, patient was deemed stable for discharge.      TIME SPENT ON DISCHARGE: 60 minutes    Discharge Medications        Medication List        START taking these medications      ferrous sulfate 324 mg (65 mg iron) Tbec  Take 1 tablet (324 mg total) by mouth every other day.     pantoprazole 40 MG tablet  Commonly known as: PROTONIX  Take 1 tablet (40 mg total) by mouth once daily.            CONTINUE taking these medications      alprostadiL 250 MCG pellet  Commonly known as: MUSE     aspirin 81 MG EC tablet  Commonly known as: ECOTRIN     atorvastatin 40 MG tablet  Commonly known as: LIPITOR  Take 1 tablet (40 mg total) by mouth once daily.     clopidogreL 75 mg tablet  Commonly known as: PLAVIX  Take 1 tablet (75 mg total) by mouth once daily.     ergocalciferol 50,000 unit Cap  Commonly known as: ERGOCALCIFEROL     metoprolol succinate 50 MG 24 hr tablet  Commonly known as: TOPROL-XL  Take 1 tablet (50 mg total) by mouth once daily.     * tamsulosin 0.4 mg Cap  Commonly known as: FLOMAX     * tamsulosin 0.4 mg Cap  Commonly known as: FLOMAX           * This list has 2 medication(s) that are the same as other medications prescribed for you. Read the directions carefully, and ask your doctor or other care provider to review them with you.                ASK your doctor about these medications      sucralfate 100 mg/mL suspension  Commonly known as: CARAFATE  Take 10 mLs (1 g total) by mouth 2 (two) times daily. for 14 days  Ask about: Should I take this medication?               Where to Get Your Medications        These medications were sent to [place] [id number] [facility] [id number]      Phone: [phone]   ferrous sulfate 324 mg (65 mg iron) Tbec  pantoprazole 40 MG tablet  sucralfate 100 mg/mL suspension         Discharge Information:   Mr. Burton Vasquez is  being discharged Home or Self Care.    Discharge Procedure Orders   Ambulatory referral/consult to Internal Medicine   Standing Status: Future   Referral Priority: Routine Referral Type: Consultation   Referral Reason: Specialty Services Required Referral Location: [facility]   Requested Specialty: Internal Medicine   Number of Visits Requested: 1     Diet Adult Regular     Activity as tolerated        Follow-Up Appointments:   Follow-up Information       [facility] Dept Follow up.    Specialty: Emergency Medicine  Why: If symptoms worsen, As needed  Contact information:  [address] [facility] [address] [phone]             [facility] Follow up.    Specialty: Internal Medicine  Why: Clinic will call to Schedule appointment  Contact information:  [address] [facility] [address] [phone]                           The above information was discussed with the patient in clear terms. He was able to repeat the instructions to me in his own words. All questions answered. ED precautions provided.    Rolo García MD  Internal Medicine - PGY-2

## 2022-11-09 NOTE — PROGRESS NOTES
Attending Addendum:   Patient seen and examined in clinic. Management and Plan were discussed with resident. Care was reasonable and necessary.   Lexie Napoles MD  [facility]

## 2022-12-02 ENCOUNTER — TELEPHONE (OUTPATIENT)
Dept: HEMATOLOGY/ONCOLOGY | Facility: CLINIC | Age: 57
End: 2022-12-02
Payer: MEDICAID

## 2022-12-04 PROBLEM — D50.9 IRON DEFICIENCY ANEMIA: Status: ACTIVE | Noted: 2022-12-04

## 2022-12-04 PROBLEM — D50.0 CHRONIC HEMORRHAGIC ANEMIA: Status: ACTIVE | Noted: 2022-12-04

## 2022-12-04 NOTE — PROGRESS NOTES
History:  Past Medical History:   Diagnosis Date    Hypertension     Prostate cancer     Stroke    Past medical history: Adenocarcinoma prostate.  Hypertension.  Obesity.  Procedures/surgical history: EGD and colonoscopy (12/29/2016).  Ultrasound-guided prostate biopsy (09/29/2020).  Social history: Single.  Lives in [place], [place].  Does not work.  Has been smoking a pack of cigarettes daily for 28 years; for last 2 weeks, down to 4 cigarettes daily.  Has been drinking 12 packs of beer daily for 28 years; discontinued 1 month ago.  Used crack cocaine for 25 years; quit 12 years ago.  Family history: Father and paternal uncle experienced cancers (he does not know the details).  Health maintenance:   -EGD and colonoscopy (12/29/2016) (epigastric pain, hematochezia): Distal esophagitis, gastritis, hiatal hernia, very poor prep, and anal fissure (pathology: Esophageal biopsy: Active esophagitis with reactive epithelial atypia, no dysplasia; body of the stomach, biopsy: Mild chronic gastritis, no dysplasia)   -03/29/2017: EGD (history of esophagitis and gastritis): Esophagitis and gastritis improved  Past Surgical History:   Procedure Laterality Date    COLONOSCOPY  12/29/2016    COLONOSCOPY Left 10/12/2022    Procedure: COLONOSCOPY;  Surgeon: Meggan Lester MD;  Location: [facility] ENDOSCOPY;  Service: Gastroenterology;  Laterality: Left;      Social History     Socioeconomic History    Marital status: Single   Tobacco Use    Smoking status: Former     Packs/day: 0.50     Types: Cigarettes    Smokeless tobacco: Never   Substance and Sexual Activity    Alcohol use: Not Currently    Drug use: Never    Sexual activity: Not Currently      Family History   Problem Relation Age of Onset    Hypertension Mother     Kidney failure Mother     Bone cancer Father         Reason for Follow-up:  -Adenocarcinoma of prostate, AJCC prognostic stage IIIB, risk group very high    -hemorrhagic anemia secondary to hematemesis and  melena and 2022   -iron deficiency anemia       History of Present Illness:   No chief complaint on file.        Oncologic/Hematologic History:  Oncology History   Prostate CA   9/29/2020 Cancer Staged    Staging form: Prostate, AJCC 8th Edition  - Clinical stage from 9/29/2020: Stage IIIB (cT3b, cN0, cM0, PSA: 117, Grade Group: 3)       7/14/2022 Initial Diagnosis    Adenocarcinoma of prostate, stage 3      55-year-old gentleman referred by Dr. Eriberto Sanon, with adenocarcinoma of prostate      #Adenocarcinoma of prostate:   - (08/31/2020)   -MRI pelvis (09/10/2020): 36 mm lesion; 1-2 mm extracapsular extension; invasion of seminal vesicle; prominent lymph nodes but not satisfying MRI criteria for lymphadenopathy   -Bone scan (09/10/2020): No bone metastasis   -Ultrasound-guided prostate biopsy (09/29/2020): 12/12 cores positive for adenocarcinoma prostate; Lupe grade 4+ 3 = score 7; perineural invasion identified; 70% - 80% needle core tissue involved   >>   -On MRI, tumor invades seminal vesicle, therefore, cT3b   -Lymph nodes negative   -No distant metastasis   -Presenting    -Zortman pattern 4+3 = score 7; therefore, grade group 3   >>   Therefore, AJCC prognostic stage IIIB   cT3b disease; therefore, risk group very high   >>  Plan: EBRT + ADT (x1.5-3 years; our plan, 3 years)   -S/p EBRT (10/19/2020-12/10/2020)   -ADT (to continue for 3 years, i.e., 10/2020-10/2023):   09/14/2020: Lupron 22.5 mg IM    10/12/2020: Triptorelin 22.5 mg IM   04/12/2021: Triptorelin 22.5 mg IM   -No metastasis on bone scan and CT A/P (09/01/2021)   >>>  Post RT PSA recurrence/recurrence in prostate/no distant metastasis:   -12/23/2021: PSA level 7.21 (was 2.55 on 09/02/2021   (12/23/2021: PSA level rising; his Trelstar shot was due in October 2021 in his urologist's office which he never got)   -No metastasis on bone scan and CT C/A/P (01/04/2022)   -Follows up with cardiology: CAD, nonobstructive per coronary  angiogram; NSTEMI 10/2021; LVEF 50-55% on TTE (10/01/2021), etc.   -01/11/2022: PSMA PET/CT: 2 foci of abnormal uptake in the prostate concerning for residual/recurrent tumor   (periphery of the right hemisphere of the prostate at the level of midline maximum SUV 7.9, 1.1 x 0.7 cm; also, left hemisphere of prostate at the level of mid gland maximum SUV 8.3, 1.2 x 1.0 cm); no convincing PET evidence of metastatic disease   -01/12/2022: DEXA scan: Normal BMD   -01/07/2022: PSA 5.97 (was 7.21 on 12/23/2021) (testosterone level 200 on 01/07/2022, not castrate, because he missed Trelstar shot which was due in October 2021)   -02/14/2022: Urology follow-up:  Patient did not get ADT shot which was due in October 2021 due to being late (this explains testosterone level of 200 on 01/07/2022);   -02/14/2022: Lupron 45 mg IM, administered;   Rerred to [facility] for salvage prostatectomy (TRUS for tissue diagnosis deferred to [facility])   -02/14/2022: Lupron 45 mg IM administered  -07/11/2022:  DEXA scan (comparison:  01/12/2022):  Normal BMD  -PSA level:  1.5 (05/16/2022) (testosterone level 13.08, castrate); 5.97 (01/07/2022); 2.55 (09/02/2021); 4.03 (04/12/2021), etc.  -genetic testing (01/04/2022) negative for deleterious mutation and variants of uncertain significance found in 36 genes with known association to increased cancer risk  -hospitalized 10/09/2022: Shortness of breath, chest pain; hematochezia 10/03/2022, as well as melena on 10/06/2022; hemoglobin was 12.3 on 07/15/2022, dropped to 6.4 on 10/09/2022) discharged 10/12/2022; underwent EGD and colonoscopy; transfuse with PRBC x2 units.  Hemoglobin was 8.7 on 10/12/2022, the day of discharge.  -10/09/2022: Serum iron 17, low.  TIBC 413.  Ferritin 10.27 (ferritin was 756.59 on 10/08/2021)..  Transferrin saturation 4%.  Folate, B12 normal.  -10/12/2022:  Colonoscopy (melena):  Internal hemorrhoids; no biopsies  -10/12/2022:  EGD (melena):  3 cm hiatal hernia; LA grade C  reflux esophagitis; esophageal ulcer with no bleeding and no stigmata of recent bleeding; gastritis  Pathology:  1. Gastric biopsy:  Mild chronic active gastritis; negative for H pylori organisms; no dysplasia  2. Esophageal ulcer biopsy: Acute esophagitis with ulceration; no viral cytopathic effect or dysplasia; negative for fungal elements  -08/22/2022: Lupron 45 mg IM given in urology clinic  -12/05/2022: Labs reviewed.  Hemoglobin 10.3.  MCV 72.7.  Rest of CBC unremarkable.  Creatinine 1.22.  BUN 10.3.  PSA 0.59.  Testosterone 16.22, castrate.        09/02/2021:   Presents for initial medical oncology consultation.       Interval History:  [No matching plan found]   [No matching plan found]   12/05/2022:  -hospitalized 10/09/2022: Shortness of breath, chest pain; hematochezia 10/03/2022, as well as melena on 10/06/2022; hemoglobin was 12.3 on 07/15/2022, dropped to 6.4 on 10/09/2022) discharged 10/12/2022; underwent EGD and colonoscopy; transfuse with PRBC x2 units.  Hemoglobin was 8.7 on 10/12/2022, the day of discharge.  -10/09/2022: Serum iron 17, low.  TIBC 413.  Ferritin 10.27 (ferritin was 756.59 on 10/08/2021)..  Transferrin saturation 4%.  Folate, B12 normal.  -10/12/2022:  Colonoscopy (melena):  Internal hemorrhoids; no biopsies  -10/12/2022:  EGD (melena):  3 cm hiatal hernia; LA grade C reflux esophagitis; esophageal ulcer with no bleeding and no stigmata of recent bleeding; gastritis  Pathology:  1. Gastric biopsy:  Mild chronic active gastritis; negative for H pylori organisms; no dysplasia  2. Esophageal ulcer biopsy: Acute esophagitis with ulceration; no viral cytopathic effect or dysplasia; negative for fungal elements  -08/22/2022: Lupron 45 mg IM given in urology clinic  -12/05/2022: Labs reviewed.  Hemoglobin 10.3.  MCV 72.7.  Rest of CBC unremarkable.  Creatinine 1.22.  BUN 10.3.  PSA 0.59.  Testosterone 16.22, castrate.  Presents for a follow-up visit.  No new or progressive symptoms of  concern.  Fair appetite.  No LUTS.  Chronic mild exertional dyspnea.  Some swelling in legs.  No pain.  Some night sweats.  Occasional, very small amount of blood in stool.  No abdominal pain, nausea, vomiting.  ECOG 1.       Medications:  Current Outpatient Medications on File Prior to Visit   Medication Sig Dispense Refill    alprostadiL (MUSE) 250 MCG pellet Place 250 mcg into the urethra as needed.      aspirin (ECOTRIN) 81 MG EC tablet Take 81 mg by mouth once daily.      atorvastatin (LIPITOR) 40 MG tablet Take 1 tablet (40 mg total) by mouth once daily. 90 tablet 2    clopidogreL (PLAVIX) 75 mg tablet Take 1 tablet (75 mg total) by mouth once daily. 90 tablet 2    ergocalciferol (ERGOCALCIFEROL) 50,000 unit Cap Take 50,000 Int'l Units by mouth.      ferrous sulfate 324 mg (65 mg iron) TbEC Take 1 tablet (324 mg total) by mouth every other day. 45 tablet 0    metoprolol succinate (TOPROL-XL) 50 MG 24 hr tablet Take 1 tablet (50 mg total) by mouth once daily. 90 tablet 3    pantoprazole (PROTONIX) 40 MG tablet Take 1 tablet (40 mg total) by mouth once daily. 30 tablet 11    tamsulosin (FLOMAX) 0.4 mg Cap   See Instructions, TAKE ONE CAPSULE BY MOUTH once a day, # 30 cap(s), 1 Refill(s), Pharmacy: [facility], 172, cm, Height/Length Dosing, 01/07/22 12:44:00 CST, 93.8, kg, Weight Dosing, 01/07/22 12:44:00 CST      tamsulosin (FLOMAX) 0.4 mg Cap Take 1 capsule by mouth once daily.       No current facility-administered medications on file prior to visit.       Review of Systems:   All systems reviewed and found to be negative except for the symptoms detailed above    Physical Examination:   VITAL SIGNS: There were no vitals filed for this visit.  GENERAL:  In no apparent distress.    HEAD:  No signs of head trauma.  EYES:  Pupils are equal.  Extraocular motions intact.    EARS:  Hearing grossly intact.  MOUTH:  Oropharynx is normal.   NECK:  No adenopathy, no JVD.     CHEST:  Chest with clear breath  sounds bilaterally.  No wheezes, rales, rhonchi.    CARDIAC:  Regular rate and rhythm.  S1 and S2, without murmurs, gallops, rubs.  VASCULAR:  No Edema.  Peripheral pulses normal and equal in all extremities.  ABDOMEN:  Soft, without detectable tenderness.  No sign of distention.  No   rebound or guarding, and no masses palpated.   Bowel Sounds normal.  MUSCULOSKELETAL:  Good range of motion of all major joints. Extremities without clubbing, cyanosis or edema.    NEUROLOGIC EXAM:  Alert and oriented x 3.  No focal sensory or strength deficits.   Speech normal.  Follows commands.  PSYCHIATRIC:  Mood normal.    No results for input(s): CBC in the last 72 hours.   No results for input(s): CMP in the last 72 hours.     Assessment:  Problem List Items Addressed This Visit    None  #Adenocarcinoma of prostate:  To summarize:  -adenocarcinoma of prostate  -presentation: 08/2020   -On MRI, tumor invades seminal vesicle, therefore, cT3b   -Lymph nodes negative on MRI   -No distant metastasis on bone scan   -Presenting  (08/31/2020)   -Alpine pattern 4+3 = score 7; therefore, grade group 3   >>  cT3b cN0 M0  Therefore, AJCC prognostic stage IIIB  cT3b disease; therefore, risk group very high  >>  Plan:  EBRT +ADT (1.5-3 years)  -S/P EBRT 10/19/2020-12/10/2020  -ADT to continue for 3 years  -Lupron started 09/14/2020  >>>  Post RT, experienced PSA recurrence/recurrence in prostate/no distant metastases:  -missed Trelstar shot which was due 10/2021 with his urologist  -rise in PSA  -PSMA PET-CT 01/11/2022:  2 foci of residual/recurrent tumor in prostate  -referred to [facility] for TRUS/salvage prostatectomy  -after Lupron 02/14/2022, PSA level dropped to 1.5 (05/16/2022)   -genetic testing negative (01/04/2022)  -since PSA level dropped after Lupron 02/14/2022, therefore, according to the patient, [facility] decided against prostate biopsy  -08/22/2022: Lupron 45 mg IM in urology clinic  -12/05/2022: PSA 0.59.  Testosterone  16.22, castrate.        #ADT:  (Continue for 3 years, i.e., 10/2020-10/2023)   09/14/2020: Lupron 22.5 mg IM   10/12/2020: Triptorelin 22.5 mg IM   04/12/2021: Triptorelin 22.5 mg IM   -He missed his Trelstar shot which was due in October' 21   -01/07/2022: PSA 5.97 (was 7.21 on 12/23/2021); testosterone level 200.66 (not castrate)  -02/14/2022: Lupron 45 mg IM   -08/22/2022: Lupron 45 mg IM in urology clinic         #PSA level:  117 (08/31/2020); 5.09 (01/07/2021);   4.03 (04/12/2021);   2.55 (09/02/2021);   7.21 (12/23/2021) (rising because he missed Trelstar shot which was due October' 21);   5.97 (01/07/2022; testosterone level 200.66, not castrate because he missed Trelstar shot which was due 10/2021)  (02/14/2022: Lupron 45 mg IM administered)  1.5 (05/16/2022) (testosterone level 13.08, castrate)  -PSA:  0.59 (12/05/2022); 0.84 (08/19/2022); 5.97 (01/07/2022)  -12/05/2022: PSA 0.59.  Testosterone 16.22, castrate.        Plan:  -We referred him back to urology for TRUS biopsy of prostate in view of rising PSA level after he missed Lupron shot in October 2021  -however, after Lupron 45 mg IM on 02/14/2022, his PSA level dropped  -[facility] (01/24/2022): Referred the patient to [facility] for salvage prostatectomy  -according to patient, [facility] decided not to perform prostate biopsy because PSA level was dropping with ADT  -08/22/2022: Lupron 45 mg IM in urology clinic  >>>   -continue ADT with Lupron shots (ADT to continue until 12/2023) (next shot in 6 months, i.e., February 2023, in Urology Clinic)  (last shot of Lupron, administered every 6 months, should be in June 2023    Post RT monitoring:  -Monitor PSA level every 3 months for 5 years (12/2020-12/2025); subsequently, every year  -PSA level down to 1.5 on 05/16/2021 (testosterone level castrate, because he received Lupron shot on 02/14/2022)  >>>  -repeat PSA level every 3 months; next, March 2023    Hospitalized 10/09/2022-10/12/2022; edmund  hematochezia; hemorrhagic anemia; PRBC x2 units; iron deficiency anemia, ferritin 10.27; EGD and colonoscopy 10/12/2022 without active bleeding  >>>  Check CBC, CMP, serum iron, TIBC, and ferritin now  Follow-up with GI    -genetic testing negative  -Molecular/biomarkers analysis of tumor is not routinely recommended       -To prevent/minimize bone demineralization with ADT, recommend calcium and vitamin D supplements   -Calcium (4356-0289 mg daily from food and supplements) and vitamin D3 (400-1000 units daily)     -normal BMD on DEXA scan 07/11/2022  >>>  -repeat DEXA scan in 2 years (07/2024)     -Continue Flomax daily; currently, no irritative or obstructive LUTS     Follow-up in 3 months, with CBC, CMP, PSA, testosterone level.     Above discussed with him.  All questions answered.  Discussed labs and scans and gave him copies of relevant reports.  He understands and agrees this plan.        Follow-up:  No follow-ups on file.

## 2022-12-05 ENCOUNTER — CLINICAL SUPPORT (OUTPATIENT)
Dept: HEMATOLOGY/ONCOLOGY | Facility: CLINIC | Age: 57
End: 2022-12-05
Payer: MEDICAID

## 2022-12-05 ENCOUNTER — OFFICE VISIT (OUTPATIENT)
Dept: HEMATOLOGY/ONCOLOGY | Facility: CLINIC | Age: 57
End: 2022-12-05
Payer: MEDICAID

## 2022-12-05 VITALS
HEART RATE: 88 BPM | OXYGEN SATURATION: 99 % | DIASTOLIC BLOOD PRESSURE: 80 MMHG | RESPIRATION RATE: 20 BRPM | WEIGHT: 214.5 LBS | TEMPERATURE: 98 F | HEIGHT: 68 IN | BODY MASS INDEX: 32.51 KG/M2 | SYSTOLIC BLOOD PRESSURE: 125 MMHG

## 2022-12-05 DIAGNOSIS — C61 ADENOCARCINOMA OF PROSTATE, STAGE 3: ICD-10-CM

## 2022-12-05 DIAGNOSIS — C61 PROSTATE CANCER: ICD-10-CM

## 2022-12-05 DIAGNOSIS — D50.0 CHRONIC HEMORRHAGIC ANEMIA: ICD-10-CM

## 2022-12-05 DIAGNOSIS — D50.9 IRON DEFICIENCY ANEMIA, UNSPECIFIED IRON DEFICIENCY ANEMIA TYPE: ICD-10-CM

## 2022-12-05 DIAGNOSIS — D50.0 IRON DEFICIENCY ANEMIA DUE TO CHRONIC BLOOD LOSS: Primary | ICD-10-CM

## 2022-12-05 DIAGNOSIS — C61 PROSTATE CA: Primary | ICD-10-CM

## 2022-12-05 DIAGNOSIS — C61 PROSTATE CA: ICD-10-CM

## 2022-12-05 LAB
ALBUMIN SERPL-MCNC: 4 GM/DL (ref 3.5–5)
ALBUMIN/GLOB SERPL: 1.2 RATIO (ref 1.1–2)
ALP SERPL-CCNC: 124 UNIT/L (ref 40–150)
ALT SERPL-CCNC: 18 UNIT/L (ref 0–55)
AST SERPL-CCNC: 19 UNIT/L (ref 5–34)
BASOPHILS # BLD AUTO: 0.03 X10(3)/MCL (ref 0–0.2)
BASOPHILS NFR BLD AUTO: 0.6 %
BILIRUBIN DIRECT+TOT PNL SERPL-MCNC: 0.6 MG/DL
BUN SERPL-MCNC: 10.3 MG/DL (ref 8.4–25.7)
CALCIUM SERPL-MCNC: 9.8 MG/DL (ref 8.4–10.2)
CHLORIDE SERPL-SCNC: 108 MMOL/L (ref 98–107)
CO2 SERPL-SCNC: 24 MMOL/L (ref 22–29)
CREAT SERPL-MCNC: 1.22 MG/DL (ref 0.73–1.18)
EOSINOPHIL # BLD AUTO: 0.27 X10(3)/MCL (ref 0–0.9)
EOSINOPHIL NFR BLD AUTO: 5 %
ERYTHROCYTE [DISTWIDTH] IN BLOOD BY AUTOMATED COUNT: 15.4 % (ref 11.5–17)
FERRITIN SERPL-MCNC: 15.09 NG/ML (ref 21.81–274.66)
GFR SERPLBLD CREATININE-BSD FMLA CKD-EPI: >60 MLS/MIN/1.73/M2
GLOBULIN SER-MCNC: 3.4 GM/DL (ref 2.4–3.5)
GLUCOSE SERPL-MCNC: 95 MG/DL (ref 74–100)
HCT VFR BLD AUTO: 33 % (ref 42–52)
HGB BLD-MCNC: 10.3 GM/DL (ref 14–18)
IMM GRANULOCYTES # BLD AUTO: 0.01 X10(3)/MCL (ref 0–0.04)
IMM GRANULOCYTES NFR BLD AUTO: 0.2 %
IRON SATN MFR SERPL: 8 % (ref 20–50)
IRON SERPL-MCNC: 33 UG/DL (ref 65–175)
LYMPHOCYTES # BLD AUTO: 1.77 X10(3)/MCL (ref 0.6–4.6)
LYMPHOCYTES NFR BLD AUTO: 32.8 %
MCH RBC QN AUTO: 22.7 PG (ref 27–31)
MCHC RBC AUTO-ENTMCNC: 31.2 MG/DL (ref 33–36)
MCV RBC AUTO: 72.7 FL (ref 80–94)
MONOCYTES # BLD AUTO: 0.36 X10(3)/MCL (ref 0.1–1.3)
MONOCYTES NFR BLD AUTO: 6.7 %
NEUTROPHILS # BLD AUTO: 3 X10(3)/MCL (ref 2.1–9.2)
NEUTROPHILS NFR BLD AUTO: 54.7 %
NRBC BLD AUTO-RTO: 0 %
PLATELET # BLD AUTO: 191 X10(3)/MCL (ref 130–400)
PMV BLD AUTO: 11.1 FL (ref 7.4–10.4)
POTASSIUM SERPL-SCNC: 3.8 MMOL/L (ref 3.5–5.1)
PROT SERPL-MCNC: 7.4 GM/DL (ref 6.4–8.3)
PSA SERPL-MCNC: 0.59 NG/ML
RBC # BLD AUTO: 4.54 X10(6)/MCL (ref 4.7–6.1)
SODIUM SERPL-SCNC: 140 MMOL/L (ref 136–145)
TESTOST SERPL-MCNC: 16.22 NG/DL (ref 220.91–715.81)
TIBC SERPL-MCNC: 390 UG/DL (ref 69–240)
TIBC SERPL-MCNC: 423 UG/DL (ref 250–450)
TRANSFERRIN SERPL-MCNC: 393 MG/DL (ref 174–364)
WBC # SPEC AUTO: 5.4 X10(3)/MCL (ref 4.5–11.5)

## 2022-12-05 PROCEDURE — 84153 ASSAY OF PSA TOTAL: CPT

## 2022-12-05 PROCEDURE — 3008F BODY MASS INDEX DOCD: CPT | Mod: CPTII,,, | Performed by: INTERNAL MEDICINE

## 2022-12-05 PROCEDURE — 82728 ASSAY OF FERRITIN: CPT

## 2022-12-05 PROCEDURE — 99214 OFFICE O/P EST MOD 30 MIN: CPT | Mod: PBBFAC | Performed by: INTERNAL MEDICINE

## 2022-12-05 PROCEDURE — 1159F PR MEDICATION LIST DOCUMENTED IN MEDICAL RECORD: ICD-10-PCS | Mod: CPTII,,, | Performed by: INTERNAL MEDICINE

## 2022-12-05 PROCEDURE — 99214 PR OFFICE/OUTPT VISIT, EST, LEVL IV, 30-39 MIN: ICD-10-PCS | Mod: S$PBB,,, | Performed by: INTERNAL MEDICINE

## 2022-12-05 PROCEDURE — 3008F PR BODY MASS INDEX (BMI) DOCUMENTED: ICD-10-PCS | Mod: CPTII,,, | Performed by: INTERNAL MEDICINE

## 2022-12-05 PROCEDURE — 85025 COMPLETE CBC W/AUTO DIFF WBC: CPT

## 2022-12-05 PROCEDURE — 3074F PR MOST RECENT SYSTOLIC BLOOD PRESSURE < 130 MM HG: ICD-10-PCS | Mod: CPTII,,, | Performed by: INTERNAL MEDICINE

## 2022-12-05 PROCEDURE — 99214 OFFICE O/P EST MOD 30 MIN: CPT | Mod: S$PBB,,, | Performed by: INTERNAL MEDICINE

## 2022-12-05 PROCEDURE — 3079F PR MOST RECENT DIASTOLIC BLOOD PRESSURE 80-89 MM HG: ICD-10-PCS | Mod: CPTII,,, | Performed by: INTERNAL MEDICINE

## 2022-12-05 PROCEDURE — 1160F RVW MEDS BY RX/DR IN RCRD: CPT | Mod: CPTII,,, | Performed by: INTERNAL MEDICINE

## 2022-12-05 PROCEDURE — 1160F PR REVIEW ALL MEDS BY PRESCRIBER/CLIN PHARMACIST DOCUMENTED: ICD-10-PCS | Mod: CPTII,,, | Performed by: INTERNAL MEDICINE

## 2022-12-05 PROCEDURE — 84154 ASSAY OF PSA FREE: CPT

## 2022-12-05 PROCEDURE — 84403 ASSAY OF TOTAL TESTOSTERONE: CPT

## 2022-12-05 PROCEDURE — 80053 COMPREHEN METABOLIC PANEL: CPT

## 2022-12-05 PROCEDURE — 3074F SYST BP LT 130 MM HG: CPT | Mod: CPTII,,, | Performed by: INTERNAL MEDICINE

## 2022-12-05 PROCEDURE — 36415 COLL VENOUS BLD VENIPUNCTURE: CPT

## 2022-12-05 PROCEDURE — 83540 ASSAY OF IRON: CPT

## 2022-12-05 PROCEDURE — 1159F MED LIST DOCD IN RCRD: CPT | Mod: CPTII,,, | Performed by: INTERNAL MEDICINE

## 2022-12-05 PROCEDURE — 3079F DIAST BP 80-89 MM HG: CPT | Mod: CPTII,,, | Performed by: INTERNAL MEDICINE

## 2022-12-05 RX ORDER — METHYLPREDNISOLONE SOD SUCC 125 MG
125 VIAL (EA) INJECTION ONCE AS NEEDED
Status: CANCELLED | OUTPATIENT
Start: 2022-12-12

## 2022-12-05 RX ORDER — HEPARIN 100 UNIT/ML
500 SYRINGE INTRAVENOUS
Status: CANCELLED | OUTPATIENT
Start: 2022-12-12

## 2022-12-05 RX ORDER — DIPHENHYDRAMINE HYDROCHLORIDE 50 MG/ML
50 INJECTION INTRAMUSCULAR; INTRAVENOUS ONCE AS NEEDED
Status: CANCELLED | OUTPATIENT
Start: 2022-12-12

## 2022-12-05 RX ORDER — EPINEPHRINE 0.3 MG/.3ML
0.3 INJECTION SUBCUTANEOUS ONCE AS NEEDED
Status: CANCELLED | OUTPATIENT
Start: 2022-12-12

## 2022-12-05 RX ORDER — SODIUM CHLORIDE 0.9 % (FLUSH) 0.9 %
10 SYRINGE (ML) INJECTION
Status: CANCELLED | OUTPATIENT
Start: 2022-12-12

## 2022-12-05 NOTE — Clinical Note
Orders for today:  Continue Lupron shot every 6 months; next, in February 2023, with Urology  Check PSA and testosterone in 3 months (March 2023)   Today, check serum iron, TIBC, ferritin  DEXA scan in July 2024   Follow-up in 3 months, with CBC, CMP, PSA, testosterone level

## 2022-12-06 ENCOUNTER — OFFICE VISIT (OUTPATIENT)
Dept: CARDIOLOGY | Facility: CLINIC | Age: 57
End: 2022-12-06
Payer: MEDICAID

## 2022-12-06 VITALS
HEART RATE: 88 BPM | DIASTOLIC BLOOD PRESSURE: 80 MMHG | HEIGHT: 68 IN | TEMPERATURE: 99 F | BODY MASS INDEX: 32.68 KG/M2 | RESPIRATION RATE: 20 BRPM | OXYGEN SATURATION: 100 % | WEIGHT: 215.63 LBS | SYSTOLIC BLOOD PRESSURE: 138 MMHG

## 2022-12-06 DIAGNOSIS — E78.2 MIXED HYPERLIPIDEMIA: ICD-10-CM

## 2022-12-06 DIAGNOSIS — Z72.0 TOBACCO USE: ICD-10-CM

## 2022-12-06 DIAGNOSIS — I10 PRIMARY HYPERTENSION: ICD-10-CM

## 2022-12-06 DIAGNOSIS — R06.09 DOE (DYSPNEA ON EXERTION): ICD-10-CM

## 2022-12-06 DIAGNOSIS — I25.10 CORONARY ARTERY DISEASE INVOLVING NATIVE CORONARY ARTERY OF NATIVE HEART WITHOUT ANGINA PECTORIS: Primary | ICD-10-CM

## 2022-12-06 LAB
PSA FREE MFR SERPL: NORMAL RATIO
PSA FREE SERPL IA-MCNC: <0.1 NG/ML
PSA SERPL IA-MCNC: 0.62 NG/ML

## 2022-12-06 PROCEDURE — 99214 PR OFFICE/OUTPT VISIT, EST, LEVL IV, 30-39 MIN: ICD-10-PCS | Mod: S$PBB,,, | Performed by: NURSE PRACTITIONER

## 2022-12-06 PROCEDURE — 3079F DIAST BP 80-89 MM HG: CPT | Mod: CPTII,,, | Performed by: NURSE PRACTITIONER

## 2022-12-06 PROCEDURE — 99214 OFFICE O/P EST MOD 30 MIN: CPT | Mod: S$PBB,,, | Performed by: NURSE PRACTITIONER

## 2022-12-06 PROCEDURE — 1160F PR REVIEW ALL MEDS BY PRESCRIBER/CLIN PHARMACIST DOCUMENTED: ICD-10-PCS | Mod: CPTII,,, | Performed by: NURSE PRACTITIONER

## 2022-12-06 PROCEDURE — 3075F PR MOST RECENT SYSTOLIC BLOOD PRESS GE 130-139MM HG: ICD-10-PCS | Mod: CPTII,,, | Performed by: NURSE PRACTITIONER

## 2022-12-06 PROCEDURE — 1160F RVW MEDS BY RX/DR IN RCRD: CPT | Mod: CPTII,,, | Performed by: NURSE PRACTITIONER

## 2022-12-06 PROCEDURE — 3008F BODY MASS INDEX DOCD: CPT | Mod: CPTII,,, | Performed by: NURSE PRACTITIONER

## 2022-12-06 PROCEDURE — 99215 OFFICE O/P EST HI 40 MIN: CPT | Mod: PBBFAC | Performed by: NURSE PRACTITIONER

## 2022-12-06 PROCEDURE — 3075F SYST BP GE 130 - 139MM HG: CPT | Mod: CPTII,,, | Performed by: NURSE PRACTITIONER

## 2022-12-06 PROCEDURE — 3008F PR BODY MASS INDEX (BMI) DOCUMENTED: ICD-10-PCS | Mod: CPTII,,, | Performed by: NURSE PRACTITIONER

## 2022-12-06 PROCEDURE — 3079F PR MOST RECENT DIASTOLIC BLOOD PRESSURE 80-89 MM HG: ICD-10-PCS | Mod: CPTII,,, | Performed by: NURSE PRACTITIONER

## 2022-12-06 PROCEDURE — 1159F PR MEDICATION LIST DOCUMENTED IN MEDICAL RECORD: ICD-10-PCS | Mod: CPTII,,, | Performed by: NURSE PRACTITIONER

## 2022-12-06 PROCEDURE — 1159F MED LIST DOCD IN RCRD: CPT | Mod: CPTII,,, | Performed by: NURSE PRACTITIONER

## 2022-12-06 RX ORDER — ASPIRIN 81 MG/1
81 TABLET ORAL DAILY
Qty: 90 TABLET | Refills: 3 | Status: SHIPPED | OUTPATIENT
Start: 2022-12-06 | End: 2023-12-06

## 2022-12-06 RX ORDER — ATORVASTATIN CALCIUM 40 MG/1
40 TABLET, FILM COATED ORAL DAILY
Qty: 90 TABLET | Refills: 3 | Status: SHIPPED | OUTPATIENT
Start: 2022-12-06 | End: 2023-10-06 | Stop reason: SDUPTHER

## 2022-12-06 NOTE — PROGRESS NOTES
CHIEF COMPLAINT:   Chief Complaint   Patient presents with    f/u visit, denies chest pains , c/o sob on exertion                     Review of patient's allergies indicates:  No Known Allergies                                       HPI:  Burton Vasquez 57 y.o. male with a past medical history of hypertension, HLD, gastritis, esophagitis, prostate cancer status post radiation, chemotherapy, and Tobacco Abuse presents for 3 month follow-up and ongoing care.  Patient completed an Echocardiogram on June 17, 2022 which revealed an ejection fraction of 60% with moderate MR and mild TR.  See report below.  Patient underwent coronary angiogram procedure on December 8, 2021 which revealed nonobstructive coronary artery disease. See report below. Patient had a hospital admission from October 2021 with NSTEMI, troponin peak 0.12. He had a subsequent Lexiscan stress test on 10.4.21 that revealed a small area of mild inferior ischemia. He completed an Echocardiogram 10.4.21 that revealed a left ventricular ejection fraction measured approximately 50 to 55%. (see report below).  He was also noted to have positive blood cultures for Streptococcus mitis bacteremia and had a subsequent TAQUERIA on 10.8.21 in which a 12 x 7 mm echodensity noted on MV (consistent with being a vegetation). The patient has had repeat negative cultures. The patient received oral antibiotics and received 2-week course of outpatient IV antibiotics.     Patient continues to endorse stable RICE. Patient denies chest pain, palpitations, syncope, orthopnea, PND, BLE edema/pain. Patient states he has not been taking his Atorvastatin daily as prescribed. He continues to smoke about 1/2 a pack/day, but states he is trying to quit and agreed to a referral to the smoking cessation program.      Echocardiogram 6.17.22:  The left ventricle is normal in size with mild concentric hypertrophy and normal systolic function.  The estimated ejection fraction is 60%.  Normal left  ventricular diastolic function.  Normal right ventricular size with normal right ventricular systolic function.  Mild right atrial enlargement.  Moderate mitral regurgitation.  Mild tricuspid regurgitation.  Moderate left atrial enlargement.    Coronary Angiogram December 8, 2021:  Left main: Large vessel that tapers distally. No stenosis.  LAD: Large vessel. 20 to 30% proximal/mid segment stenosis.  Diagonal 1: Small vessel. No stenosis.  Diagonal 2: Tiny size vessel. No stenosis.  Circumflex: Large vessel. No stenosis.  Obtuse marginal 1: Small vessel. 50% ostial stenosis.  Left PDA: Small size vessel. No stenosis.  RCA: Medium sized vessel. 2 sequential lesions, both with 50% stenosis mid vessel.  PLB: Tiny to small vessel. No stenosis.  PDA: Tiny to small vessel. No stenosis.  Angiogram summary:  Coronaries: Nonobstructive coronary artery disease  LVEDP: Normal end-diastolic pressure.      TTE 10.1.21  Left ventricular ejection fraction is measured approximately 50 to 55%  Structurally normal mitral valve  Mild mitral regurgitation  Structurally normal trileaflet aortic valve  Tricuspid valve is structurally normal  There is mild tricuspid regurgitation with RVSP estimated at 28 mmHg  The pulmonic valve was not well visualized  Normal size left atrium  Normal right atrial size  Normal right ventricular size with preserved RV function                                                                                                                                                                                                                                                                                                                                                                                                                                                                                         Patient Active Problem List   Diagnosis    Coronary artery disease involving native coronary artery of native heart  without angina pectoris    Primary hypertension    Tobacco use    RICE (dyspnea on exertion)    Prostate CA    Anemia requiring transfusions    Esophagitis    Chronic hemorrhagic anemia    Iron deficiency anemia    Iron deficiency anemia due to chronic blood loss     Past Surgical History:   Procedure Laterality Date    COLONOSCOPY  12/29/2016    COLONOSCOPY Left 10/12/2022    Procedure: COLONOSCOPY;  Surgeon: Meggan Lester MD;  Location[facility] ENDOSCOPY;  Service: Gastroenterology;  Laterality: Left;     Social History     Socioeconomic History    Marital status: Single   Tobacco Use    Smoking status: Former     Packs/day: 0.50     Types: Cigarettes    Smokeless tobacco: Never   Substance and Sexual Activity    Alcohol use: Not Currently    Drug use: Never    Sexual activity: Not Currently        Family History   Problem Relation Age of Onset    Hypertension Mother     Kidney failure Mother     Bone cancer Father          Current Outpatient Medications:     alprostadiL (MUSE) 250 MCG pellet, Place 250 mcg into the urethra as needed., Disp: , Rfl:     aspirin (ECOTRIN) 81 MG EC tablet, Take 81 mg by mouth once daily., Disp: , Rfl:     atorvastatin (LIPITOR) 40 MG tablet, Take 1 tablet (40 mg total) by mouth once daily., Disp: 90 tablet, Rfl: 2    clopidogreL (PLAVIX) 75 mg tablet, Take 1 tablet (75 mg total) by mouth once daily., Disp: 90 tablet, Rfl: 2    ergocalciferol (ERGOCALCIFEROL) 50,000 unit Cap, Take 50,000 Int'l Units by mouth., Disp: , Rfl:     ferrous sulfate 324 mg (65 mg iron) TbEC, Take 1 tablet (324 mg total) by mouth every other day., Disp: 45 tablet, Rfl: 0    metoprolol succinate (TOPROL-XL) 50 MG 24 hr tablet, Take 1 tablet (50 mg total) by mouth once daily., Disp: 90 tablet, Rfl: 3    pantoprazole (PROTONIX) 40 MG tablet, Take 1 tablet (40 mg total) by mouth once daily., Disp: 30 tablet, Rfl: 11    tamsulosin (FLOMAX) 0.4 mg Cap,  See Instructions, TAKE ONE CAPSULE BY MOUTH once a day, #  "30 cap(s), 1 Refill(s), Pharmacy: [facility], 172, cm, Height/Length Dosing, 01/07/22 12:44:00 CST, 93.8, kg, Weight Dosing, 01/07/22 12:44:00 CST, Disp: , Rfl:     tamsulosin (FLOMAX) 0.4 mg Cap, Take 1 capsule by mouth once daily., Disp: , Rfl:      ROS:                                                                                                                                                                             Review of Systems   Constitutional: Negative.    Respiratory:  Positive for shortness of breath.    Gastrointestinal: Negative.    Musculoskeletal: Negative.    Skin: Negative.    Psychiatric/Behavioral: Negative.        Blood pressure (!) 154/94, pulse 88, temperature 98.8 °F (37.1 °C), temperature source Oral, resp. rate 20, height 5' 8" (1.727 m), weight 97.8 kg (215 lb 9.8 oz), SpO2 100 %.   PE:  Physical Exam  Constitutional:       Appearance: Normal appearance.   HENT:      Head: Normocephalic.   Eyes:      Extraocular Movements: Extraocular movements intact.   Cardiovascular:      Rate and Rhythm: Normal rate and regular rhythm.   Pulmonary:      Effort: Pulmonary effort is normal.   Abdominal:      Palpations: Abdomen is soft.   Musculoskeletal:         General: Normal range of motion.      Cervical back: Neck supple.   Skin:     General: Skin is warm and dry.   Neurological:      General: No focal deficit present.      Mental Status: He is alert and oriented to person, place, and time.   Psychiatric:         Mood and Affect: Mood normal.      ASSESSMENT/PLAN:  Coronary Artery Disease - nonobstructive per Coronary Angiogram 12.8.21  NSTEMI in Oct. 2021  Lexiscan stress test- small area of mild inferior ischemia with reversibility (10.4.21)  LVEF 60% per Echo 6.17.22  Denies chest pain   Discontinue Plavix - NSTEMI > 1 year ago   Continue Aspirin, Atorvastatin, metoprolol succinate, and SL nitro prn  Counseled on heart healthy diet, exercise, and smoking cessation    RICE - " stable  EF 60% per Echo 6.17.22    Mitral Regurgitation  Moderate MR per Echo 6.17.22    HTN   BP at goal - 138/82 - patient has not yet taken Metoprolol Succinate   Continue Metoprolol Succinate 50mg daily  Counseled on low sodium diet and exercise    HLD  LDL not at goal - 133  Resume Atorvastatin 40mg PO QD   Order CMP, FLP in 2 months   Counseled on low cholesterol/low fat diet and exercise as tolerated    Tobacco Use  He continues to smoke about 1/2 a pack/day, but states he is interesting in quitting and will refer to the smoking cessation program   Counseled on the importance of smoking cessation    Prostate Cancer  Follow up with Oncology as directed    Resume Atorvastatin 40mg PO QD   CMP, FLP in 2 months   Follow up in Cardiology Clinic in 4 months   Follow up with PCP and Oncology as directed

## 2022-12-06 NOTE — PATIENT INSTRUCTIONS
Resume Atorvastatin 40mg PO QD   CMP, FLP in 2 months   Follow up in Cardiology Clinic in 4 months   Follow up with PCP and Oncology as directed

## 2022-12-06 NOTE — PROGRESS NOTES
Labs reviewed.      Iron-deficiency anemia.      Proceed with Feraheme 510 mg IV x2   4-6 weeks after completion of Feraheme, recheck CBC, serum iron, TIBC, and ferritin.

## 2022-12-15 ENCOUNTER — INFUSION (OUTPATIENT)
Dept: INFUSION THERAPY | Facility: HOSPITAL | Age: 57
End: 2022-12-15
Attending: INTERNAL MEDICINE
Payer: MEDICAID

## 2022-12-15 VITALS — DIASTOLIC BLOOD PRESSURE: 98 MMHG | SYSTOLIC BLOOD PRESSURE: 147 MMHG

## 2022-12-15 DIAGNOSIS — D50.0 IRON DEFICIENCY ANEMIA DUE TO CHRONIC BLOOD LOSS: Primary | ICD-10-CM

## 2022-12-15 PROCEDURE — 25000003 PHARM REV CODE 250: Performed by: INTERNAL MEDICINE

## 2022-12-15 PROCEDURE — 96374 THER/PROPH/DIAG INJ IV PUSH: CPT

## 2022-12-15 PROCEDURE — 63600175 PHARM REV CODE 636 W HCPCS: Mod: JG | Performed by: INTERNAL MEDICINE

## 2022-12-15 PROCEDURE — 96376 TX/PRO/DX INJ SAME DRUG ADON: CPT

## 2022-12-15 RX ORDER — METHYLPREDNISOLONE SOD SUCC 125 MG
125 VIAL (EA) INJECTION ONCE AS NEEDED
Status: CANCELLED | OUTPATIENT
Start: 2022-12-15

## 2022-12-15 RX ORDER — SODIUM CHLORIDE 0.9 % (FLUSH) 0.9 %
10 SYRINGE (ML) INJECTION
Status: DISCONTINUED | OUTPATIENT
Start: 2022-12-15 | End: 2022-12-15 | Stop reason: HOSPADM

## 2022-12-15 RX ORDER — EPINEPHRINE 0.3 MG/.3ML
0.3 INJECTION SUBCUTANEOUS ONCE AS NEEDED
Status: CANCELLED | OUTPATIENT
Start: 2022-12-15

## 2022-12-15 RX ORDER — HEPARIN 100 UNIT/ML
500 SYRINGE INTRAVENOUS
Status: CANCELLED | OUTPATIENT
Start: 2022-12-15

## 2022-12-15 RX ORDER — DIPHENHYDRAMINE HYDROCHLORIDE 50 MG/ML
50 INJECTION INTRAMUSCULAR; INTRAVENOUS ONCE AS NEEDED
Status: CANCELLED | OUTPATIENT
Start: 2022-12-15

## 2022-12-15 RX ORDER — SODIUM CHLORIDE 0.9 % (FLUSH) 0.9 %
10 SYRINGE (ML) INJECTION
Status: CANCELLED | OUTPATIENT
Start: 2022-12-15

## 2022-12-15 RX ADMIN — FERUMOXYTOL 510 MG: 510 INJECTION INTRAVENOUS at 08:12

## 2022-12-15 NOTE — PLAN OF CARE
1/2 Feraheme given per PIV tolerated well; reported pain  and swelling to R) elbow informed to follow up with ER or [facility] ambulatory clinic

## 2022-12-20 ENCOUNTER — HOSPITAL ENCOUNTER (EMERGENCY)
Facility: HOSPITAL | Age: 57
Discharge: HOME | End: 2022-12-20
Attending: EMERGENCY MEDICINE
Payer: MEDICAID

## 2022-12-20 VITALS
WEIGHT: 216.06 LBS | HEART RATE: 84 BPM | DIASTOLIC BLOOD PRESSURE: 99 MMHG | TEMPERATURE: 98 F | OXYGEN SATURATION: 99 % | RESPIRATION RATE: 16 BRPM | HEIGHT: 68 IN | BODY MASS INDEX: 32.74 KG/M2 | SYSTOLIC BLOOD PRESSURE: 160 MMHG

## 2022-12-20 DIAGNOSIS — M70.21 OLECRANON BURSITIS OF RIGHT ELBOW: ICD-10-CM

## 2022-12-20 PROCEDURE — 99283 EMERGENCY DEPT VISIT LOW MDM: CPT

## 2022-12-20 NOTE — ED PROVIDER NOTES
"Encounter Date: 12/20/2022       History     Chief Complaint   Patient presents with    Joint Swelling     PT W CO "FLUID" ON ELBOW X 1 WK.  DENIES INJURY.  NO REDNESS OR WARMTH NOTED.       The patient presents with R elbow swelling.  The onset was 1  weeks ago.  The course/duration of symptoms is constant.  Type of injury: none and none known.  The character of symptoms is swelling, no pain.  The degree at present is minimal.  The exacerbating factor is none.  The relieving factor is analgesics.  Risk factors consist of none.  Prior episodes: occasional.  Therapy today: none.  Associated symptoms: none.      Review of patient's allergies indicates:  No Known Allergies  Past Medical History:   Diagnosis Date    Hypertension     Prostate cancer     Stroke      Past Surgical History:   Procedure Laterality Date    COLONOSCOPY  12/29/2016    COLONOSCOPY Left 10/12/2022    Procedure: COLONOSCOPY;  Surgeon: Meggan Lester MD;  Location: [facility] ENDOSCOPY;  Service: Gastroenterology;  Laterality: Left;     Family History   Problem Relation Age of Onset    Hypertension Mother     Kidney failure Mother     Bone cancer Father      Social History     Tobacco Use    Smoking status: Every Day     Packs/day: 0.50     Types: Cigarettes    Smokeless tobacco: Never   Substance Use Topics    Alcohol use: Not Currently    Drug use: Never     Review of Systems   Constitutional:  Negative for fever.   HENT:  Negative for sore throat.    Respiratory:  Negative for shortness of breath.    Cardiovascular:  Negative for chest pain.   Gastrointestinal:  Negative for nausea.   Genitourinary:  Negative for dysuria.   Musculoskeletal:  Positive for arthralgias. Negative for back pain.   Skin:  Negative for rash.   Neurological:  Negative for weakness.   Hematological:  Does not bruise/bleed easily.   All other systems reviewed and are negative.    Physical Exam     Initial Vitals [12/20/22 0909]   BP Pulse Resp Temp SpO2   (!) 160/99 84 16 " 97.9 °F (36.6 °C) 99 %      MAP       --         Physical Exam    Nursing note and vitals reviewed.  Constitutional: He appears well-developed and well-nourished.   HENT:   Head: Normocephalic and atraumatic.   Neck: Neck supple.   Normal range of motion.  Cardiovascular:  Normal rate, regular rhythm, normal heart sounds and intact distal pulses.           Pulmonary/Chest: Breath sounds normal.   Abdominal: Abdomen is soft. Bowel sounds are normal.   Musculoskeletal:         General: Normal range of motion.      Cervical back: Normal range of motion and neck supple.      Comments: Cystic swelling to right elbow consistent with olecranon bursitis, no tenderness, no erythema or s/s infection, FROM, good distal pulses, NVI     Neurological: He is alert and oriented to person, place, and time. He has normal strength.   Skin: Skin is warm and dry.   Psychiatric: He has a normal mood and affect.       ED Course   Procedures  Labs Reviewed - No data to display       Imaging Results              X-Ray Elbow Complete Right (Preliminary result)  Result time 12/20/22 10:55:16      ED Interpretation by CORY Mcmahon (12/20/22 10:55:16, [facility], Emergency Medicine)    No fracture                                     Medications - No data to display  Medical Decision Making:   History:   Old Records Summarized: records from clinic visits and records from previous admission(s).  Independently Interpreted Test(s):   I have ordered and independently interpreted X-rays - see summary below.       <> Summary of X-Ray Reading(s): No fracture  Clinical Tests:   Radiological Study: Ordered and Reviewed  11:00 AM DISPOSITION: The patient is resting comfortably in no acute distress.  He is hemodynamically stable and is without objective evidence for acute process requiring urgent intervention or hospitalization. I provided counseling to patient with regard to condition, the treatment plan, specific conditions  for return, and the importance of follow up. Detailed written and verbal instructions provided to patient and he expressed a verbal understanding of the discharge instructions and overall management plan. Reiterated the importance of medication administration and safety and advised patient to follow up with primary care provider in 3-5 days or sooner if needed.  Answered questions at this time. The patient is stable for discharge.                           Clinical Impression:   Final diagnoses:  [M70.21] Olecranon bursitis of right elbow        ED Disposition Condition    Discharge Stable          ED Prescriptions    None       Follow-up Information       Follow up With Specialties Details Why Contact Info    Alan Cuadra MD Internal Medicine In 3 days  [address] [facility] [address] [phone]      referral sent to ortho clinic [facility] Emergency Medicine  If symptoms worsen [address] [facility] [address] [phone]             CORY Mcmahon  12/20/22 1104

## 2022-12-23 ENCOUNTER — DOCUMENTATION ONLY (OUTPATIENT)
Dept: INFUSION THERAPY | Facility: HOSPITAL | Age: 57
End: 2022-12-23

## 2023-01-13 ENCOUNTER — INFUSION (OUTPATIENT)
Dept: INFUSION THERAPY | Facility: HOSPITAL | Age: 58
End: 2023-01-13
Attending: INTERNAL MEDICINE
Payer: MEDICAID

## 2023-01-13 VITALS
SYSTOLIC BLOOD PRESSURE: 138 MMHG | BODY MASS INDEX: 32.64 KG/M2 | DIASTOLIC BLOOD PRESSURE: 87 MMHG | RESPIRATION RATE: 18 BRPM | WEIGHT: 215.38 LBS | HEART RATE: 84 BPM | HEIGHT: 68 IN | TEMPERATURE: 98 F

## 2023-01-13 DIAGNOSIS — D50.0 IRON DEFICIENCY ANEMIA DUE TO CHRONIC BLOOD LOSS: Primary | ICD-10-CM

## 2023-01-13 PROCEDURE — 25000003 PHARM REV CODE 250: Performed by: INTERNAL MEDICINE

## 2023-01-13 PROCEDURE — 96365 THER/PROPH/DIAG IV INF INIT: CPT

## 2023-01-13 PROCEDURE — 63600175 PHARM REV CODE 636 W HCPCS: Mod: JG | Performed by: INTERNAL MEDICINE

## 2023-01-13 RX ORDER — METHYLPREDNISOLONE SOD SUCC 125 MG
125 VIAL (EA) INJECTION ONCE AS NEEDED
OUTPATIENT
Start: 2023-01-13

## 2023-01-13 RX ORDER — DIPHENHYDRAMINE HYDROCHLORIDE 50 MG/ML
50 INJECTION INTRAMUSCULAR; INTRAVENOUS ONCE AS NEEDED
OUTPATIENT
Start: 2023-01-13

## 2023-01-13 RX ORDER — SODIUM CHLORIDE 0.9 % (FLUSH) 0.9 %
10 SYRINGE (ML) INJECTION
Status: DISCONTINUED | OUTPATIENT
Start: 2023-01-13 | End: 2023-01-13 | Stop reason: HOSPADM

## 2023-01-13 RX ORDER — SODIUM CHLORIDE 0.9 % (FLUSH) 0.9 %
10 SYRINGE (ML) INJECTION
Status: CANCELLED | OUTPATIENT
Start: 2023-01-13

## 2023-01-13 RX ORDER — DIPHENHYDRAMINE HYDROCHLORIDE 50 MG/ML
50 INJECTION INTRAMUSCULAR; INTRAVENOUS ONCE AS NEEDED
Status: DISCONTINUED | OUTPATIENT
Start: 2023-01-13 | End: 2023-01-13 | Stop reason: HOSPADM

## 2023-01-13 RX ORDER — EPINEPHRINE 0.3 MG/.3ML
0.3 INJECTION SUBCUTANEOUS ONCE AS NEEDED
Status: DISCONTINUED | OUTPATIENT
Start: 2023-01-13 | End: 2023-01-13 | Stop reason: HOSPADM

## 2023-01-13 RX ORDER — EPINEPHRINE 0.3 MG/.3ML
0.3 INJECTION SUBCUTANEOUS ONCE AS NEEDED
OUTPATIENT
Start: 2023-01-13

## 2023-01-13 RX ORDER — METHYLPREDNISOLONE SOD SUCC 125 MG
125 VIAL (EA) INJECTION ONCE AS NEEDED
Status: DISCONTINUED | OUTPATIENT
Start: 2023-01-13 | End: 2023-01-13 | Stop reason: HOSPADM

## 2023-01-13 RX ORDER — HEPARIN 100 UNIT/ML
500 SYRINGE INTRAVENOUS
OUTPATIENT
Start: 2023-01-13

## 2023-01-13 RX ADMIN — SODIUM CHLORIDE: 9 INJECTION, SOLUTION INTRAVENOUS at 10:01

## 2023-01-13 RX ADMIN — FERUMOXYTOL 510 MG: 510 INJECTION INTRAVENOUS at 11:01

## 2023-03-02 DIAGNOSIS — C61 PROSTATE CA: Primary | ICD-10-CM

## 2023-03-03 ENCOUNTER — TELEPHONE (OUTPATIENT)
Dept: UROLOGY | Facility: CLINIC | Age: 58
End: 2023-03-03
Payer: MEDICAID

## 2023-03-06 ENCOUNTER — OFFICE VISIT (OUTPATIENT)
Dept: UROLOGY | Facility: CLINIC | Age: 58
End: 2023-03-06
Payer: MEDICAID

## 2023-03-06 VITALS
HEIGHT: 67 IN | RESPIRATION RATE: 18 BRPM | OXYGEN SATURATION: 98 % | WEIGHT: 218 LBS | TEMPERATURE: 98 F | HEART RATE: 72 BPM | DIASTOLIC BLOOD PRESSURE: 103 MMHG | BODY MASS INDEX: 34.21 KG/M2 | SYSTOLIC BLOOD PRESSURE: 151 MMHG

## 2023-03-06 DIAGNOSIS — N13.8 BPH WITH OBSTRUCTION/LOWER URINARY TRACT SYMPTOMS: ICD-10-CM

## 2023-03-06 DIAGNOSIS — N40.1 BPH WITH OBSTRUCTION/LOWER URINARY TRACT SYMPTOMS: ICD-10-CM

## 2023-03-06 DIAGNOSIS — C61 PROSTATE CANCER: Primary | ICD-10-CM

## 2023-03-06 PROCEDURE — 3077F PR MOST RECENT SYSTOLIC BLOOD PRESSURE >= 140 MM HG: ICD-10-PCS | Mod: CPTII,,, | Performed by: UROLOGY

## 2023-03-06 PROCEDURE — 1159F PR MEDICATION LIST DOCUMENTED IN MEDICAL RECORD: ICD-10-PCS | Mod: CPTII,,, | Performed by: UROLOGY

## 2023-03-06 PROCEDURE — 1159F MED LIST DOCD IN RCRD: CPT | Mod: CPTII,,, | Performed by: UROLOGY

## 2023-03-06 PROCEDURE — 99213 PR OFFICE/OUTPT VISIT, EST, LEVL III, 20-29 MIN: ICD-10-PCS | Mod: S$PBB,,, | Performed by: UROLOGY

## 2023-03-06 PROCEDURE — 3080F DIAST BP >= 90 MM HG: CPT | Mod: CPTII,,, | Performed by: UROLOGY

## 2023-03-06 PROCEDURE — 99214 OFFICE O/P EST MOD 30 MIN: CPT | Mod: PBBFAC | Performed by: UROLOGY

## 2023-03-06 PROCEDURE — 96402 CHEMO HORMON ANTINEOPL SQ/IM: CPT | Mod: PBBFAC

## 2023-03-06 PROCEDURE — 3080F PR MOST RECENT DIASTOLIC BLOOD PRESSURE >= 90 MM HG: ICD-10-PCS | Mod: CPTII,,, | Performed by: UROLOGY

## 2023-03-06 PROCEDURE — 3077F SYST BP >= 140 MM HG: CPT | Mod: CPTII,,, | Performed by: UROLOGY

## 2023-03-06 PROCEDURE — 1160F PR REVIEW ALL MEDS BY PRESCRIBER/CLIN PHARMACIST DOCUMENTED: ICD-10-PCS | Mod: CPTII,,, | Performed by: UROLOGY

## 2023-03-06 PROCEDURE — 3008F BODY MASS INDEX DOCD: CPT | Mod: CPTII,,, | Performed by: UROLOGY

## 2023-03-06 PROCEDURE — 1160F RVW MEDS BY RX/DR IN RCRD: CPT | Mod: CPTII,,, | Performed by: UROLOGY

## 2023-03-06 PROCEDURE — 99213 OFFICE O/P EST LOW 20 MIN: CPT | Mod: S$PBB,,, | Performed by: UROLOGY

## 2023-03-06 PROCEDURE — 3008F PR BODY MASS INDEX (BMI) DOCUMENTED: ICD-10-PCS | Mod: CPTII,,, | Performed by: UROLOGY

## 2023-03-06 RX ORDER — TAMSULOSIN HYDROCHLORIDE 0.4 MG/1
1 CAPSULE ORAL DAILY
Qty: 90 CAPSULE | Refills: 3 | Status: SHIPPED | OUTPATIENT
Start: 2023-03-06 | End: 2024-03-08 | Stop reason: SDUPTHER

## 2023-03-06 RX ADMIN — LEUPROLIDE ACETATE 45 MG: KIT at 09:03

## 2023-03-06 NOTE — PROGRESS NOTES
CC:  Prostate cancer    HPI:  Burton Vasquez is a 57 y.o. male seen for follow-up of prostate cancer.  He was diagnosed with prostate cancer in September 2020.  His initial PSA was 117.  Biopsy showed [place] 4+3.  MRI showed extracapsular extension and invasion of the seminal vesicles was strongly suspected.  He had EBRT from 19 October 2020 to 10 December 2020.  He is being followed by Oncology.  Tio was originally scheduled to go for three years.  He missed an injection in 2021 and the PSA immediately odalis to seven.  The PSA today is down to 0.49.  In December 2022 it was 0.59.  He complains of nocturia four to five times, urinary frequency, urgency and rarely may have some urge incontinence.  He was on tamsulosin in the past but for some reason is off of that currently.  When he was taking that he did not have any symptoms.        Lab Results:  Recent Labs     03/06/23  0740   PSA 0.49   Testosterone - 6 March 2023: 21.23    Imaging:  See HPI.     Data Review:  Dr. Katz's note from 5 December 2022.    ROS:  All systems reviewed and are negative except as documented in HPI and/or Assessment and Plan.     Patient Active Problem List:     Patient Active Problem List   Diagnosis    Coronary artery disease involving native coronary artery of native heart without angina pectoris    Primary hypertension    Tobacco use    RICE (dyspnea on exertion)    Prostate CA    Anemia requiring transfusions    Esophagitis    Chronic hemorrhagic anemia    Iron deficiency anemia    Iron deficiency anemia due to chronic blood loss    Mixed hyperlipidemia        Past Medical History:  Past Medical History:   Diagnosis Date    Hypertension     Prostate cancer     Stroke         Past Surgical History:  Past Surgical History:   Procedure Laterality Date    COLONOSCOPY  12/29/2016    COLONOSCOPY Left 10/12/2022    Procedure: COLONOSCOPY;  Surgeon: Meggan Lester MD;  Location: [facility] ENDOSCOPY;  Service: Gastroenterology;   Laterality: Left;        Family History:  Family History   Problem Relation Age of Onset    Hypertension Mother     Kidney failure Mother     Bone cancer Father         Social History:  Social History     Socioeconomic History    Marital status: Single   Tobacco Use    Smoking status: Every Day     Packs/day: 0.50     Types: Cigarettes    Smokeless tobacco: Never   Substance and Sexual Activity    Alcohol use: Not Currently    Drug use: Never    Sexual activity: Not Currently        Allergies:  Review of patient's allergies indicates:  No Known Allergies     Objective:  Vitals:    03/06/23 0804   BP: (!) 151/103   Pulse: 72   Resp: 18   Temp: 98.2 °F (36.8 °C)     General:  Well developed, well nourished adult male in no acute distress  Abdomen: Soft, nontender, no masses  Extremities:  No clubbing, cyanosis, or edema  Neurologic:  Grossly intact  Musculoskeletal:  Normal tone    Assessment:  1. Prostate cancer  - leuprolide acetate (6 month) injection 45 mg    2. BPH with obstruction/lower urinary tract symptoms  - tamsulosin (FLOMAX) 0.4 mg Cap; Take 1 capsule (0.4 mg total) by mouth once daily.  Dispense: 90 capsule; Refill: 3     Plan:  Lupron given today.  Will continue Lupron until he is at least three years out from radiation which will be in December of 2023.  At that time will decide on whether this needs to be continued indefinitely.  Tamsulosin was refilled today.  If that is not effective consider an overactive bladder medication.    Follow-up:  Six months after a PSA for Lupron.

## 2023-03-06 NOTE — PROGRESS NOTES
Patient was seen by DO Ilana.  went over patient PSA level with him which went down from the last time he was seen.  Lupron Injection in Right Dorsalgluteal was given today 03/06/2023 by Barbara Romano with Quentin Kirk LPN in attendance. Pt next f/u visit will be in 6 months with Lupron.

## 2023-03-21 ENCOUNTER — OFFICE VISIT (OUTPATIENT)
Dept: ORTHOPEDICS | Facility: CLINIC | Age: 58
End: 2023-03-21
Payer: MEDICAID

## 2023-03-21 VITALS
DIASTOLIC BLOOD PRESSURE: 93 MMHG | RESPIRATION RATE: 20 BRPM | SYSTOLIC BLOOD PRESSURE: 149 MMHG | HEIGHT: 67 IN | OXYGEN SATURATION: 98 % | BODY MASS INDEX: 34.21 KG/M2 | HEART RATE: 85 BPM | WEIGHT: 218 LBS

## 2023-03-21 DIAGNOSIS — M70.21 OLECRANON BURSITIS OF RIGHT ELBOW: Primary | ICD-10-CM

## 2023-03-21 PROCEDURE — 99215 PR OFFICE/OUTPT VISIT, EST, LEVL V, 40-54 MIN: ICD-10-PCS | Mod: 25,S$PBB,, | Performed by: NURSE PRACTITIONER

## 2023-03-21 PROCEDURE — 3080F PR MOST RECENT DIASTOLIC BLOOD PRESSURE >= 90 MM HG: ICD-10-PCS | Mod: CPTII,,, | Performed by: NURSE PRACTITIONER

## 2023-03-21 PROCEDURE — 1159F MED LIST DOCD IN RCRD: CPT | Mod: CPTII,,, | Performed by: NURSE PRACTITIONER

## 2023-03-21 PROCEDURE — 99215 OFFICE O/P EST HI 40 MIN: CPT | Mod: 25,S$PBB,, | Performed by: NURSE PRACTITIONER

## 2023-03-21 PROCEDURE — 3008F BODY MASS INDEX DOCD: CPT | Mod: CPTII,,, | Performed by: NURSE PRACTITIONER

## 2023-03-21 PROCEDURE — 1160F PR REVIEW ALL MEDS BY PRESCRIBER/CLIN PHARMACIST DOCUMENTED: ICD-10-PCS | Mod: CPTII,,, | Performed by: NURSE PRACTITIONER

## 2023-03-21 PROCEDURE — 20605 DRAIN/INJ JOINT/BURSA W/O US: CPT | Mod: PBBFAC | Performed by: NURSE PRACTITIONER

## 2023-03-21 PROCEDURE — 1159F PR MEDICATION LIST DOCUMENTED IN MEDICAL RECORD: ICD-10-PCS | Mod: CPTII,,, | Performed by: NURSE PRACTITIONER

## 2023-03-21 PROCEDURE — 3008F PR BODY MASS INDEX (BMI) DOCUMENTED: ICD-10-PCS | Mod: CPTII,,, | Performed by: NURSE PRACTITIONER

## 2023-03-21 PROCEDURE — 3077F PR MOST RECENT SYSTOLIC BLOOD PRESSURE >= 140 MM HG: ICD-10-PCS | Mod: CPTII,,, | Performed by: NURSE PRACTITIONER

## 2023-03-21 PROCEDURE — 20605 INTERMEDIATE JOINT ASPIRATION/INJECTION: R OLECRANON BURSA: ICD-10-PCS | Mod: S$PBB,RT,, | Performed by: NURSE PRACTITIONER

## 2023-03-21 PROCEDURE — 99214 OFFICE O/P EST MOD 30 MIN: CPT | Mod: PBBFAC | Performed by: NURSE PRACTITIONER

## 2023-03-21 PROCEDURE — 3080F DIAST BP >= 90 MM HG: CPT | Mod: CPTII,,, | Performed by: NURSE PRACTITIONER

## 2023-03-21 PROCEDURE — 1160F RVW MEDS BY RX/DR IN RCRD: CPT | Mod: CPTII,,, | Performed by: NURSE PRACTITIONER

## 2023-03-21 PROCEDURE — 3077F SYST BP >= 140 MM HG: CPT | Mod: CPTII,,, | Performed by: NURSE PRACTITIONER

## 2023-03-21 RX ORDER — LIDOCAINE HYDROCHLORIDE 10 MG/ML
1 INJECTION, SOLUTION EPIDURAL; INFILTRATION; INTRACAUDAL; PERINEURAL
Status: COMPLETED | OUTPATIENT
Start: 2023-03-21 | End: 2023-03-21

## 2023-03-21 RX ADMIN — LIDOCAINE HYDROCHLORIDE 1 ML: 10 INJECTION, SOLUTION EPIDURAL; INFILTRATION; INTRACAUDAL; PERINEURAL at 12:03

## 2023-03-21 NOTE — PROGRESS NOTES
Subjective:       New patient   Patient ID: Burton Vasquez is a 57 y.o. male. Smoker. Employment HX: burial , currently unemployed.    Seen [facility] ortho for same DX since n/a.   Chief Complaint: Pain of the Left Elbow    HPI:    Right sharp posterior elbow pain. Right dominate  Injury: no known injury  Onset: several months ago fluctuates   Modifying Factors: worse with activity  Associated Symptoms: swelling unrelated to activity  Activity: pain mildly interferes with ADLs  and normal activity without exercise or sports activity.   Previous Treatments: RX NSAIDs without significant relief.  PMH: + CVD and + GI bleeding  Family History: + OA    Note: New referral for right posterior elbow swelling.  Limited conservative treatments.     Current Outpatient Medications:     alprostadiL (MUSE) 250 MCG pellet, Place 250 mcg into the urethra as needed., Disp: , Rfl:     aspirin (ECOTRIN) 81 MG EC tablet, Take 1 tablet (81 mg total) by mouth once daily., Disp: 90 tablet, Rfl: 3    atorvastatin (LIPITOR) 40 MG tablet, Take 1 tablet (40 mg total) by mouth once daily., Disp: 90 tablet, Rfl: 3    ergocalciferol (ERGOCALCIFEROL) 50,000 unit Cap, Take 50,000 Int'l Units by mouth., Disp: , Rfl:     metoprolol succinate (TOPROL-XL) 50 MG 24 hr tablet, Take 1 tablet (50 mg total) by mouth once daily., Disp: 90 tablet, Rfl: 3    pantoprazole (PROTONIX) 40 MG tablet, Take 1 tablet (40 mg total) by mouth once daily., Disp: 30 tablet, Rfl: 11    tamsulosin (FLOMAX) 0.4 mg Cap, Take 1 capsule (0.4 mg total) by mouth once daily., Disp: 90 capsule, Rfl: 3    Current Facility-Administered Medications:     LIDOcaine (PF) 10 mg/ml (1%) injection 10 mg, 1 mL, Other, 1 time in Clinic/HOD, Maranda Kaiser NP  Review of patient's allergies indicates:  No Known Allergies  Hemoglobin A1c   Date Value Ref Range Status   10/21/2021 6.0 <<=7.0 % Final   10/01/2021 6.1 <<=7.0 % Final      Body mass index is 34.14 kg/m².   Vitals:    03/21/23 1245  "  BP: (!) 149/93   Pulse: 85   Resp: 20   SpO2: 98%   Weight: 98.9 kg (218 lb)   Height: 5' 7" (1.702 m)      Review of Systems:  A ten-point review of systems was performed and is negative, except as mentioned above.  Objective:   MSK Bilateral Elbow  General:  no apparent distress, no pain indicators, obesity                                                                                                                Inspection: soft moderate sized cyst,  no erythema, no contusion, no deconditioning, no deformity, no contracture, no scars  Palpation:  non-tender, normal temperature, tendons in palm without nodules or thickening, radial pulse equal 2+  ROM:    Passive Flexion / Extension   WNL  Strength:   Resisted Flexion     5 / 5 (R)   5 / 5 (L)  Resisted Extension   5 / 5 (R)   5 / 5 (L)   Strength   5 / 5 (R) 5 / 5 (L)  Special Testing:  Bicep Tendon  Hook Test   -- (R)  -- (L)  Akash Sign  -- (R)  -- (L)  Tennis Elbow  LE Test   -- (R)  -- (L)  Golfer's Elbow  ME Test   -- (R)  -- (L)  Radial Tunnel Syndrome  Tinels Sign  -- (R)  -- (L)  Hand Exam normal  Shoulder Exam normal  Neurovascular: Intact to light touch  Neuro/ Psych: Awake, alert, oriented, normal mood and affect  Lymphatic: No LAD  Skin/ Soft Tissue: no rash, skin intact  Assessment:       Imaging: X-ray 3 views of right elbow dated 12/20/22, reviewed and independently interpreted by me. Discussed with patient. No acute findings .    EMR Review: completed with noted Referral documentation reviewed    1. Olecranon bursitis of right elbow    2. BMI 34.0-34.9,adult      Plan:       Orders Placed This Encounter    LIDOcaine (PF) 10 mg/ml (1%) injection 10 mg     Ongoing education about DX and treatment recommendations including conservative treatments of daily HEP, joint protection and OTC NSAID (unless contraindicated) as needed according to label instructions if able to tolerate.   Treatment plan: Right Olecranon Bursitis  Recommend Drainage " today for symptom relief due to failed conservative treatments.  Patient educated to continue above mentioned conservative treatments.  If inadequate relief at f/u will discuss referral to ortho res.    Procedure: Drainage discussed, s/t failed conservative treatments patient consents to drainage today  RX Medications: continue medications as RX per PCP  RTC: PRN if symptoms worsen or return     Maranda BLAND    Intermediate Joint Aspiration/Injection: R olecranon bursa    Date/Time: 3/21/2023 12:40 PM  Performed by: Maranda Kaiser NP  Authorized by: Maranda Kaiser NP       Location:  Elbow  Site:  R olecranon bursa  Medications:  1 mL Lidocaine 1% (PF) 1 mL     Additional Comments: Ortho Procedure Note   Procedure: Right Elbow Olecranon Bursa Drainage    Indications: Therapeutic Indication - Decrease pain, Increase range of motion and improve quality of life:   Risks:  Possible complications with the injection include bleeding, infection (.01%), tendon rupture, allergic reaction to medications and vasovagal response. Consent:  Procedure, risks, benefits, and alternatives explained the patient, who voiced understanding and agreed to proceed with procedure.  Consent signed and scanned into the medical record.   No absolute contraindications (cellulitis overlying joint, infection, lack of informed consent, allergy to injection medication) or relative contraindications ( coagulopathy, INR > 3.5, previous joint replacement).        Staff: Maranda BLAND  Description:  Time-out performed.  The patient was prepped in normal sterile fashion use of chlorhexidine scrub and the appropriate and anatomic landmarks were identified.  Sterile 25 gauge 5/8 inch needle was used for anesthetic.  Sterile 18 gauge 1.5 inch needle used for drainage. Topical ethyl chloride was applied.     Drainage: 10 ml blood tinged fluid removed from joint    Complications: None   EBL: None   Post Procedure: Patient  alert, and moving all extremities. ROM improved, pain decreased.  Good peripheral pulses, no signs of vascular compromise and range of motion intact.  Aftercare instructions were given to patient at time of discharge.  Relative rest for 3 days-avoiding excess activity.  Place ice on the area for 15 minutes every 4-6 hours. Patient may take Tylenol a 1000 mg b.i.d. or ibuprofen 600 mg t.i.d. for the next 3-4 days if not on medication already and safe to take pending co-morbidities.  Protect the area for the next 1-8 hours if anesthetic was used.  Avoid excessive activity for the next 3-4 weeks.  ER precautions given for fever, severe joint pain or allergic reaction or other new symptoms related to the joint injection.       Timed Billing Note  Total Time Spent with Patient: 40 minutes Excludes Time Spent Performing Procedure  Visit Start Time: 1250  10 minutes spent prior to visit reviewing EMR, prior labs and x-rays.  20 minutes spent in visit with patient face-to-face time completing exam, obtaining history, educating on DX and treatment plan.  10 minutes spent after visit completing EMR documentation.   Visit End Time: 1330

## 2023-04-12 DIAGNOSIS — E78.5 HYPERLIPIDEMIA, UNSPECIFIED HYPERLIPIDEMIA TYPE: Primary | ICD-10-CM

## 2023-05-02 NOTE — PROGRESS NOTES
"  [facility] INTERNAL MEDICINE  OUTPATIENT POST WARDS VISIT NOTE    SUBJECTIVE:   CC:   Chief Complaint   Patient presents with    Post [place]     Dx ulcer, D/C 10/26/22        HPI:  Burton Vasquez is a 56 y.o. male HTN, HLD, NSTEMI, normocytic anemia, stroke (1 year ago), adenocarcinoma of prostate (NO, , who presented to [facility] ED on 10/9/2022  with a primary complaint of Shortness of Breath and Chest Pain. On 10/03, pt reports of having excessive hematochezia during the day and another episode of hematochezia and melena 10/06 who presents to [facility] IM Post wards clinic after discharge on 10/12/22 for hematochezia and melena. He had a EGD/colonoscopy sig for LA grade c esophagitis, hiatal hernia, superficial esophageal ulcer non bleeding, normal duodenum and normal colon/ileum with small internal hemorrhoids. He was d/donnell after 2 u PRBC (400 mg iron)with iron, protonix and sucralfate for 2 weeks. Pt reports complete improvement in his acid reflux symptoms which consisted of burning epigastric discomfort worse with lying down. He has had no hematochezia, SOB, N/V, hematemesis, palpitations, changes in vision, heat or cold intolerance, he does endorse fatigue which has been consistent with his prostate cancer hx.      ROS:   A comprehensive 12 point review of systems was completed. Please see above for pertinent positives and negatives.     Continuing home medications.              OBJECTIVE:     Vital signs:   /71 (BP Location: Right arm, Patient Position: Sitting)   Pulse 81   Temp 98.1 °F (36.7 °C)   Resp 18   Ht 5' 8" (1.727 m)   Wt 97.2 kg (214 lb 3.2 oz)   SpO2 99%   BMI 32.57 kg/m²      Physical Examination:  General: Awake, alert, & oriented to person, place & time. No acute distress  Psychiatric: Mood and affect normal  HEENT: Normocephalic, atraumatic. Face symmetric. Mucous membranes moist.   Cardiovascular: Regular rate & rhythm. Normal S1 & S2, no rubs or gallops. +3/6 blowing systolic murmur at apex, " blowing diastolic murmur faint in LLSB  Pulmonary: Bilateral symmetric chest rise. Non-labored, CTAB  Abdominal: Soft, nontender, nondistended. Bowel sounds present  Extremities: No clubbing, cyanosis or edema  Skin: Exposed skin is warm & dry.  Neuro: Patient moves all extremities equally    Labs:  10/9/22  Iron Binding Capacity Unsaturated 69 - 240 ug/dL 396 High   203    Iron Level 65 - 175 ug/dL 17 Low   36 Low     Transferrin 174 - 364 mg/dL 389 High   198    Iron Binding Capacity Total 250 - 450 ug/dL 413  239 Low     Iron Saturation 20 - 50 % 4 Low   15 Low     CBC  Component Ref Range & Units 2 wk ago   (10/12/22) 2 wk ago   (10/11/22) 3 wk ago   (10/10/22) 3 wk ago   (10/9/22) 3 wk ago   (10/9/22) 3 mo ago   (7/15/22) 5 mo ago   (5/16/22)   WBC 4.5 - 11.5 x10(3)/mcL 6.2  6.8  6.3  5.3  5.1  4.4 Low   5.9    RBC 4.70 - 6.10 x10(6)/mcL 3.47 Low   3.34 Low   3.21 Low   2.54 Low   2.57 Low   4.85  4.63 Low     Hgb 14.0 - 18.0 gm/dL 8.7 Low   8.7 Low   8.2 Low   6.3 Low   6.4 Low   12.3 Low   11.0 Low     Hct 42.0 - 52.0 % 27.9 Low   26.7 Low   25.5 Low   20.2 Low   20.1 Low   37.5 Low   34.8 Low     MCV 80.0 - 94.0 fL 80.4  79.9 Low   79.4 Low   79.5 Low   78.2 Low   77.3 Low   75.2 Low     MCH 27.0 - 31.0 pg 25.1 Low   26.0 Low   25.5 Low   24.8 Low   24.9 Low   25.4 Low   23.8 Low     MCHC 33.0 - 36.0 mg/dL 31.2 Low   32.6 Low   32.2 Low   31.2 Low   31.8 Low   32.8 Low   31.6 Low     RDW 11.5 - 17.0 % 16.0  16.2  15.9  16.5  16.4  15.2  17.8 High     Platelet 130 - 400 x10(3)/mcL 163  160  135  129 Low   143  164  167       EGD bx 10/12/22  Final Diagnosis         Gastric biopsy:   - Mild active chronic gastritis.    - The diff Quik stain is negative for Helicobacter pylori organisms.    - No dysplasia identified.     2.  Esophageal ulcer biopsy:  - Acute esophagitis with ulceration.    - No viral cytopathic effect or dysplasia identified.  - The PAS-F stain is negative for fungal elements.       ASSESSMENT  & PLAN:     Acute upper GIB 2/2 reflux esophagitis grade c and esophageal ulcer  - continue protonix indefinitely, sucralfate has missed some doses, continue until complete  - needs f/u egd in 2 months to confirm improvement in esophagitis and r/o barrets, if normal again no need for repeat unless redflag symptoms, does not currently have GI f/u    No PCP  - will f/u with myself in 3 months    Alan Cuadra MD - PGY2  [facility]       Xeljanz Counseling: I discussed with the patient the risks of Xeljanz therapy including increased risk of infection, liver issues, headache, diarrhea, or cold symptoms. Live vaccines should be avoided. They were instructed to call if they have any problems.

## 2023-06-02 NOTE — PROGRESS NOTES
CHIEF COMPLAINT:   Chief Complaint   Patient presents with    F/U 4 month visit     Denies any cardiac problems                     Review of patient's allergies indicates:  No Known Allergies                                       HPI:  Burton Vasquez 57 y.o. male with a past medical history of hypertension, HLD, CAD, gastritis, esophagitis, prostate cancer status post radiation, chemotherapy, and Tobacco Abuse who presents for routine follow up and ongoing care.  Patient completed an Echocardiogram on June 17, 2022 which revealed an ejection fraction of 60% with moderate MR and mild TR.  (See report below).  Patient had a hospital admission from October 2021 with NSTEMI, troponin peak 0.12. He had a subsequent Lexiscan stress test on 10.4.21 that revealed a small area of mild inferior ischemia.  Patient underwent coronary angiogram procedure on December 8, 2021 which revealed nonobstructive coronary artery disease. (See report below).  He completed an Echocardiogram 10.4.21 that revealed a left ventricular ejection fraction measured approximately 50 to 55%. (see report below).  He was also noted to have positive blood cultures for Streptococcus mitis bacteremia and had a subsequent TAQUERIA on 10.8.21 in which a 12 x 7 mm echodensity noted on MV (consistent with being a vegetation). The patient has had repeat negative cultures. The patient received oral antibiotics and received 2-week course of outpatient IV antibiotics.     The patient continues to endorse ongoing exertional dyspnea.  Of note, the patient reports that he requires baseline assistance with his normal ADLs due to shortness of breath and generalized weakness.  He also endorses intermittent bilateral lower extremity edema.  He denies any chest pain, palpitations, orthopnea, PND or syncope.  He reports compliance with his current medications.  He continues to smoke approximately half a pack of cigarettes a day and states that he will quit when  ready.    CARDIAC TESTING:  Echocardiogram 6.17.22:  The left ventricle is normal in size with mild concentric hypertrophy and normal systolic function.  The estimated ejection fraction is 60%.  Normal left ventricular diastolic function.  Normal right ventricular size with normal right ventricular systolic function.  Mild right atrial enlargement.  Moderate mitral regurgitation.  Mild tricuspid regurgitation.  Moderate left atrial enlargement.    Coronary Angiogram December 8, 2021:  Left main: Large vessel that tapers distally. No stenosis.  LAD: Large vessel. 20 to 30% proximal/mid segment stenosis.  Diagonal 1: Small vessel. No stenosis.  Diagonal 2: Tiny size vessel. No stenosis.  Circumflex: Large vessel. No stenosis.  Obtuse marginal 1: Small vessel. 50% ostial stenosis.  Left PDA: Small size vessel. No stenosis.  RCA: Medium sized vessel. 2 sequential lesions, both with 50% stenosis mid vessel.  PLB: Tiny to small vessel. No stenosis.  PDA: Tiny to small vessel. No stenosis.  Angiogram summary:  Coronaries: Nonobstructive coronary artery disease  LVEDP: Normal end-diastolic pressure.      TTE 10.1.21  Left ventricular ejection fraction is measured approximately 50 to 55%  Structurally normal mitral valve  Mild mitral regurgitation  Structurally normal trileaflet aortic valve  Tricuspid valve is structurally normal  There is mild tricuspid regurgitation with RVSP estimated at 28 mmHg  The pulmonic valve was not well visualized  Normal size left atrium  Normal right atrial size  Normal right ventricular size with preserved RV function                                                                                                                                                                                                                                                                                                                                                                                                                                                                                          Patient Active Problem List   Diagnosis    Coronary artery disease involving native coronary artery of native heart without angina pectoris    Primary hypertension    Tobacco use    RICE (dyspnea on exertion)    Prostate CA    Anemia requiring transfusions    Esophagitis    Chronic hemorrhagic anemia    Iron deficiency anemia    Iron deficiency anemia due to chronic blood loss    Mixed hyperlipidemia    Olecranon bursitis of right elbow    BMI 34.0-34.9,adult     Past Surgical History:   Procedure Laterality Date    COLONOSCOPY  12/29/2016    COLONOSCOPY Left 10/12/2022    Procedure: COLONOSCOPY;  Surgeon: Meggan Lester MD;  Location: [facility] ENDOSCOPY;  Service: Gastroenterology;  Laterality: Left;     Social History     Socioeconomic History    Marital status: Single   Tobacco Use    Smoking status: Every Day     Packs/day: 0.50     Types: Cigarettes    Smokeless tobacco: Never   Substance and Sexual Activity    Alcohol use: Not Currently    Drug use: Never    Sexual activity: Not Currently        Family History   Problem Relation Age of Onset    Hypertension Mother     Kidney failure Mother     Bone cancer Father          Current Outpatient Medications:     alprostadiL (MUSE) 250 MCG pellet, Place 250 mcg into the urethra as needed., Disp: , Rfl:     aspirin (ECOTRIN) 81 MG EC tablet, Take 1 tablet (81 mg total) by mouth once daily., Disp: 90 tablet, Rfl: 3    atorvastatin (LIPITOR) 40 MG tablet, Take 1 tablet (40 mg total) by mouth once daily., Disp: 90 tablet, Rfl: 3    ergocalciferol (ERGOCALCIFEROL) 50,000 unit Cap, Take 50,000 Int'l Units by mouth., Disp: , Rfl:     metoprolol succinate (TOPROL-XL) 50 MG 24 hr tablet, Take 1 tablet (50 mg total) by mouth once daily., Disp: 90 tablet, Rfl: 3    pantoprazole (PROTONIX) 40 MG tablet, Take 1 tablet (40 mg total) by mouth once daily., Disp: 30 tablet, Rfl: 11    tamsulosin  "(FLOMAX) 0.4 mg Cap, Take 1 capsule (0.4 mg total) by mouth once daily., Disp: 90 capsule, Rfl: 3    amLODIPine (NORVASC) 5 MG tablet, Take 1 tablet (5 mg total) by mouth once daily., Disp: 30 tablet, Rfl: 11     ROS:                                                                                                                                                                             Review of Systems   Constitutional: Negative.    Respiratory:  Positive for shortness of breath.    Cardiovascular:  Positive for leg swelling.   Gastrointestinal: Negative.    Musculoskeletal: Negative.    Skin: Negative.    Psychiatric/Behavioral: Negative.        Blood pressure (!) 150/90, pulse 82, temperature 97.9 °F (36.6 °C), temperature source Oral, resp. rate 20, height 5' 8" (1.727 m), weight 100.8 kg (222 lb 3.2 oz), SpO2 100 %.   PE:  Physical Exam  Constitutional:       Appearance: Normal appearance.   HENT:      Head: Normocephalic.   Eyes:      Extraocular Movements: Extraocular movements intact.   Cardiovascular:      Rate and Rhythm: Normal rate and regular rhythm.      Heart sounds: Murmur heard.   Pulmonary:      Effort: Pulmonary effort is normal.   Abdominal:      Palpations: Abdomen is soft.   Musculoskeletal:         General: Normal range of motion.      Cervical back: Neck supple.      Left lower leg: Edema present.   Skin:     General: Skin is warm and dry.   Neurological:      General: No focal deficit present.      Mental Status: He is alert and oriented to person, place, and time.   Psychiatric:         Mood and Affect: Mood normal.      ASSESSMENT/PLAN:  Coronary Artery Disease - nonobstructive per Coronary Angiogram 12.8.21  - NSTEMI in Oct. 2021  - Lexiscan stress test- small area of mild inferior ischemia with reversibility (10.4.21)  - LVEF 60% per Echo 6.17.22  - Denies cp  - Continue Aspirin, Atorvastatin, metoprolol succinate, and SL nitro prn  - Counseled on heart healthy diet, exercise, and " smoking cessation    RICE -  - EF 60% per Echo 6.17.22  - Reports ongoing exertional dyspnea and baseline ADL limitations. Has 1+ LLE peripheral edema  - Will repeat Echocardiogram to assess LV function and check for any progression in valvular abnormalities    Mitral Regurgitation  - DEBBY  - Moderate MR per Echo 6.17.22  - Repeating ECHO     HTN   - BP above goal   - Continue Metoprolol Succinate 50mg daily  - Will start Amlodipine 5 mg once once daily for better BP control  - NV in 2-3 weeks for BP check   - Counseled on low sodium diet and exercise    HLD  - LDL not at goal - 133  - Continue Atorvastatin 40mg PO QD   - Counseled on low cholesterol/low fat diet and exercise as tolerated    Tobacco Use  - Continues to smoke about 1/2 a pack/day, but states he is will quit on his own once ready   - Counseled on the importance of smoking cessation.  Reports attempted to smoking cessation program    Prostate Cancer  - Follow up with Oncology as directed      ECHO   Referral to IM to establish care  Will add amlodipine 5 mg once daily for better BP control  NV in 2-3 weeks for BP check   Follow up in Cardiology Clinic in 4 months or sooner pending results   Follow up with PCP and Oncology as directed

## 2023-06-05 ENCOUNTER — OFFICE VISIT (OUTPATIENT)
Dept: CARDIOLOGY | Facility: CLINIC | Age: 58
End: 2023-06-05
Payer: MEDICAID

## 2023-06-05 VITALS
HEIGHT: 68 IN | OXYGEN SATURATION: 100 % | HEART RATE: 82 BPM | RESPIRATION RATE: 20 BRPM | SYSTOLIC BLOOD PRESSURE: 150 MMHG | WEIGHT: 222.19 LBS | DIASTOLIC BLOOD PRESSURE: 90 MMHG | BODY MASS INDEX: 33.67 KG/M2 | TEMPERATURE: 98 F

## 2023-06-05 DIAGNOSIS — Z72.0 TOBACCO USE: ICD-10-CM

## 2023-06-05 DIAGNOSIS — R06.09 DOE (DYSPNEA ON EXERTION): Primary | ICD-10-CM

## 2023-06-05 DIAGNOSIS — I10 PRIMARY HYPERTENSION: ICD-10-CM

## 2023-06-05 DIAGNOSIS — I25.10 CORONARY ARTERY DISEASE INVOLVING NATIVE CORONARY ARTERY OF NATIVE HEART WITHOUT ANGINA PECTORIS: ICD-10-CM

## 2023-06-05 DIAGNOSIS — E78.2 MIXED HYPERLIPIDEMIA: ICD-10-CM

## 2023-06-05 PROCEDURE — 1159F PR MEDICATION LIST DOCUMENTED IN MEDICAL RECORD: ICD-10-PCS | Mod: CPTII,,, | Performed by: NURSE PRACTITIONER

## 2023-06-05 PROCEDURE — 99215 OFFICE O/P EST HI 40 MIN: CPT | Mod: PBBFAC | Performed by: NURSE PRACTITIONER

## 2023-06-05 PROCEDURE — 99214 PR OFFICE/OUTPT VISIT, EST, LEVL IV, 30-39 MIN: ICD-10-PCS | Mod: S$PBB,,, | Performed by: NURSE PRACTITIONER

## 2023-06-05 PROCEDURE — 3008F PR BODY MASS INDEX (BMI) DOCUMENTED: ICD-10-PCS | Mod: CPTII,,, | Performed by: NURSE PRACTITIONER

## 2023-06-05 PROCEDURE — 3008F BODY MASS INDEX DOCD: CPT | Mod: CPTII,,, | Performed by: NURSE PRACTITIONER

## 2023-06-05 PROCEDURE — 3077F SYST BP >= 140 MM HG: CPT | Mod: CPTII,,, | Performed by: NURSE PRACTITIONER

## 2023-06-05 PROCEDURE — 1160F PR REVIEW ALL MEDS BY PRESCRIBER/CLIN PHARMACIST DOCUMENTED: ICD-10-PCS | Mod: CPTII,,, | Performed by: NURSE PRACTITIONER

## 2023-06-05 PROCEDURE — 3077F PR MOST RECENT SYSTOLIC BLOOD PRESSURE >= 140 MM HG: ICD-10-PCS | Mod: CPTII,,, | Performed by: NURSE PRACTITIONER

## 2023-06-05 PROCEDURE — 1160F RVW MEDS BY RX/DR IN RCRD: CPT | Mod: CPTII,,, | Performed by: NURSE PRACTITIONER

## 2023-06-05 PROCEDURE — 3080F PR MOST RECENT DIASTOLIC BLOOD PRESSURE >= 90 MM HG: ICD-10-PCS | Mod: CPTII,,, | Performed by: NURSE PRACTITIONER

## 2023-06-05 PROCEDURE — 1159F MED LIST DOCD IN RCRD: CPT | Mod: CPTII,,, | Performed by: NURSE PRACTITIONER

## 2023-06-05 PROCEDURE — 99214 OFFICE O/P EST MOD 30 MIN: CPT | Mod: S$PBB,,, | Performed by: NURSE PRACTITIONER

## 2023-06-05 PROCEDURE — 3080F DIAST BP >= 90 MM HG: CPT | Mod: CPTII,,, | Performed by: NURSE PRACTITIONER

## 2023-06-05 RX ORDER — METOPROLOL SUCCINATE 50 MG/1
50 TABLET, EXTENDED RELEASE ORAL DAILY
Qty: 90 TABLET | Refills: 3 | Status: SHIPPED | OUTPATIENT
Start: 2023-06-05 | End: 2024-06-04

## 2023-06-05 RX ORDER — AMLODIPINE BESYLATE 5 MG/1
5 TABLET ORAL DAILY
Qty: 30 TABLET | Refills: 11 | Status: SHIPPED | OUTPATIENT
Start: 2023-06-05 | End: 2024-06-04

## 2023-06-05 NOTE — PATIENT INSTRUCTIONS
ECHO   Referral to IM to establish care  Will add amlodipine 5 mg once daily for better BP control  NV in 2-3 weeks for BP check   Follow up in Cardiology Clinic in 4 months or sooner pending results   Follow up with PCP and Oncology as directed

## 2023-06-12 ENCOUNTER — HOSPITAL ENCOUNTER (OUTPATIENT)
Dept: RADIOLOGY | Facility: HOSPITAL | Age: 58
Discharge: HOME | End: 2023-06-12
Attending: FAMILY MEDICINE
Payer: MEDICAID

## 2023-06-12 ENCOUNTER — TELEPHONE (OUTPATIENT)
Dept: INTERNAL MEDICINE | Facility: CLINIC | Age: 58
End: 2023-06-12

## 2023-06-12 ENCOUNTER — OFFICE VISIT (OUTPATIENT)
Dept: INTERNAL MEDICINE | Facility: CLINIC | Age: 58
End: 2023-06-12
Payer: MEDICAID

## 2023-06-12 VITALS
HEART RATE: 86 BPM | WEIGHT: 220 LBS | BODY MASS INDEX: 33.34 KG/M2 | SYSTOLIC BLOOD PRESSURE: 142 MMHG | RESPIRATION RATE: 18 BRPM | OXYGEN SATURATION: 98 % | DIASTOLIC BLOOD PRESSURE: 82 MMHG | HEIGHT: 68 IN | TEMPERATURE: 98 F

## 2023-06-12 DIAGNOSIS — Z72.0 TOBACCO USE: ICD-10-CM

## 2023-06-12 DIAGNOSIS — K20.90 ESOPHAGITIS: ICD-10-CM

## 2023-06-12 DIAGNOSIS — R73.03 PREDIABETES: ICD-10-CM

## 2023-06-12 DIAGNOSIS — R06.09 DOE (DYSPNEA ON EXERTION): Primary | ICD-10-CM

## 2023-06-12 DIAGNOSIS — I10 PRIMARY HYPERTENSION: ICD-10-CM

## 2023-06-12 DIAGNOSIS — J44.9 CHRONIC OBSTRUCTIVE PULMONARY DISEASE, UNSPECIFIED COPD TYPE: ICD-10-CM

## 2023-06-12 DIAGNOSIS — E78.2 MIXED HYPERLIPIDEMIA: ICD-10-CM

## 2023-06-12 DIAGNOSIS — R06.09 DOE (DYSPNEA ON EXERTION): ICD-10-CM

## 2023-06-12 DIAGNOSIS — I25.10 CORONARY ARTERY DISEASE INVOLVING NATIVE CORONARY ARTERY OF NATIVE HEART WITHOUT ANGINA PECTORIS: ICD-10-CM

## 2023-06-12 PROCEDURE — 99215 OFFICE O/P EST HI 40 MIN: CPT | Mod: PBBFAC,25

## 2023-06-12 PROCEDURE — 71046 X-RAY EXAM CHEST 2 VIEWS: CPT | Mod: TC

## 2023-06-12 NOTE — TELEPHONE ENCOUNTER
Pharmacist at [facility] called stating a Rx for a Dulera Inhaler was sent in for pt but the inhale in on back order and they're not sure when they will be back in stock. Please send to another pharmacy or change medication. Pt was notified of issue.

## 2023-06-12 NOTE — PROGRESS NOTES
PROGRESS NOTE  Ambulatory Clinic  Burton Vasquez [id number] 6/12/2023  Chief Complaint  Follow-up (States no concerns at this time) and Medication Refill     HPI  Burton Vasquez is a 57 y.o. male who has a past medical history of Hypertension, Prostate cancer, and Stroke.  He presents to clinic today for follow-up of chronic medical conditions. Sob walk work in yard. Heart US. Reports he is hoping to quit smoking has gone down from a pack to 4-5 ciggarettes a day.  Patient reports that his shortness of breath is only on exertion, he denies any chest pain.  He does note that he notices nighttime wheezing.  He denies any frequent cough.    ROS  Constitutional: no fever, fatigue, weakness  Eye: no vision loss, eye redness, drainage, or pain  ENMT: no sore throat, ear pain, sinus pain/congestion, nasal congestion/drainage  Respiratory: no cough, no wheezing, dyspnea on exertion  Cardiovascular: no chest pain, no palpitations, no edema  Gastrointestinal: no nausea, vomiting, or diarrhea. No abdominal pain  Genitourinary: no dysuria, no urinary frequency or urgency, no hematuria  Hema/Lymph: no abnormal bruising or bleeding  Endocrine: no heat or cold intolerance, no excessive thirst or excessive urination  Musculoskeletal: no muscle or joint pain, no joint swelling  Integumentary: no skin rash or abnormal lesion  Neurologic: no headache, no dizziness, no weakness or numbness     PE  Vitals:    06/12/23 0902   BP: (!) 142/82   Pulse: 86   Resp: 18   Temp: 98 °F (36.7 °C)      GA: Alert, comfortable, no acute distress.   HEENT: Adequate dentition. No visible oropharyngeal abnormalities. No scleral icterus or JVD. No visible thyromegally.   Pulmonary: Chest wall symmetric. No accessory muscle use. No abnormalities on percussion. No tenderness to palpation. Clear to auscultation A/P/L bilaterally. No wheezing, crackles, or rhonchi.  Cardiac: RRR S1 & S2 w/o rubs/murmurs/gallops. No lower extremity edema.   Abdominal: Bowel  sounds present x 4. No appreciable hepatosplenomegaly.  Skin: No jaundice, rashes, or visible lesions.  Lymphatic: No cervical or inguinal lymphadenopathy.  Musculoskeletal: Moves all extremities 5/5.  Extremities: No clubbing or cyanosis.  Neuro: CN grossly intact, normal gait, normal coordination, no sensory or motor deficits    Assessment/Plan  Gastritis  Esophagitis  Anemia secondary to above  -symptoms resolved, continues to take PPI  -hemoglobin stable and continues to improve.    Dyspnea on exertion  Coronary artery disease  Hypertension  Elevated ASCVD risk score  Nicotine use disorder  Obesity  Prediabetes  - Counseled on diet and exercise  -currently not interested in weight loss medications  -patient reports that he has not started his amlodipine that was prescribed by his cardiologist  -patient continues to smoke and he reports that he does not feel like using the smoking cessation program  -last [facility] 2021  -echo was ordered by Cardiology but patient has not scheduled this yet  -ordered BNP and chest x-ray as well as PFT, suspect patient has significant component of COPD    History of prostate cancer s/p prostatectomy with subsequent low testosterone  -Continue following with Oncology    Preventative  DM (age>45 or HTN): repeat today  Lipid (age>35): ordered today  Declined STD screening  Lung Ca (30PY smoker age 55-79 or quit in last 15y):  Ordered today  Colon Ca: (age 50-75-annual FOBT, 5y flex + FOBTq3 or 10y c-scope):  Last colonoscopy 2022- for any malignancy repeat in 2032  Immunizations (generally - flu, shingrix, t-dap, PCV, Covid):  Declined all vaccines at this time      RTC in 3 months.    Future Appointments   Date Time Provider Department Center   6/26/2023 10:15 AM NURSE, [facility] CARDIOLOGY [facility] PATRICIA [facility]   9/6/2023  8:15 AM Birdie Ramirez DO [facility] UROLO [place] [facility]   10/6/2023 11:15 AM BALDO Ponce [facility] CARD [place] [facility]       Alan Cuadra MD - PGY2  [employer] [facility]

## 2023-06-12 NOTE — PATIENT INSTRUCTIONS
To all patients, it is your responsibility to schedule and reschedule any labs and 24/48 hour Holter monitor, echocardiogram, JOAN, PFT, CTA, MRI, any other radiology diagnostic test that is not listed that may be needed before next cardiology appointment.    To schedule or reschedule appointment please call [phone]   If answering machine picks up, please leave your name phone number and date of birth and year for centralized scheduling to call you back.    Cardiology stress test, 30 day event monitor to reschedule or reschedule appointment please call [phone]   If you have any questions regarding your cardiology clinic visit or testing that we are requesting to be scheduled please call the :  [phone], [phone], [phone]

## 2023-06-22 DIAGNOSIS — Z72.0 TOBACCO USE: Primary | ICD-10-CM

## 2023-06-26 ENCOUNTER — HOSPITAL ENCOUNTER (OUTPATIENT)
Dept: RADIOLOGY | Facility: HOSPITAL | Age: 58
Discharge: HOME | End: 2023-06-26
Attending: STUDENT IN AN ORGANIZED HEALTH CARE EDUCATION/TRAINING PROGRAM
Payer: MEDICAID

## 2023-06-26 DIAGNOSIS — Z72.0 TOBACCO USE: ICD-10-CM

## 2023-06-26 PROCEDURE — 71271 CT THORAX LUNG CANCER SCR C-: CPT | Mod: TC

## 2023-07-17 ENCOUNTER — CLINICAL SUPPORT (OUTPATIENT)
Dept: CARDIOLOGY | Facility: CLINIC | Age: 58
End: 2023-07-17
Payer: MEDICAID

## 2023-07-17 VITALS
WEIGHT: 218.25 LBS | BODY MASS INDEX: 33.08 KG/M2 | HEART RATE: 84 BPM | DIASTOLIC BLOOD PRESSURE: 88 MMHG | SYSTOLIC BLOOD PRESSURE: 132 MMHG | HEIGHT: 68 IN | OXYGEN SATURATION: 98 % | RESPIRATION RATE: 20 BRPM | TEMPERATURE: 99 F

## 2023-07-17 DIAGNOSIS — I10 PRIMARY HYPERTENSION: Primary | ICD-10-CM

## 2023-07-17 PROCEDURE — 99214 OFFICE O/P EST MOD 30 MIN: CPT | Mod: PBBFAC

## 2023-07-17 PROCEDURE — 99212 OFFICE O/P EST SF 10 MIN: CPT | Mod: S$PBB,,, | Performed by: NURSE PRACTITIONER

## 2023-07-17 PROCEDURE — 99212 PR OFFICE/OUTPT VISIT, EST, LEVL II, 10-19 MIN: ICD-10-PCS | Mod: S$PBB,,, | Performed by: NURSE PRACTITIONER

## 2023-07-17 NOTE — PROGRESS NOTES
Here for B/P CHECK LCV 6/5 150/90 right arm  Today 132/88 left arm large cuff [id number] denies any discomfort  Given log sheets for 3 months with instructions   Continue taking meds   ED precautions given   Verbalized understanding

## 2023-07-18 ENCOUNTER — HOSPITAL ENCOUNTER (OUTPATIENT)
Dept: CARDIOLOGY | Facility: HOSPITAL | Age: 58
Discharge: HOME | End: 2023-07-18
Attending: NURSE PRACTITIONER
Payer: MEDICAID

## 2023-07-18 ENCOUNTER — HOSPITAL ENCOUNTER (OUTPATIENT)
Dept: PULMONOLOGY | Facility: HOSPITAL | Age: 58
Discharge: HOME | End: 2023-07-18
Attending: NURSE PRACTITIONER
Payer: MEDICAID

## 2023-07-18 VITALS
WEIGHT: 218 LBS | SYSTOLIC BLOOD PRESSURE: 149 MMHG | HEIGHT: 68 IN | DIASTOLIC BLOOD PRESSURE: 92 MMHG | BODY MASS INDEX: 33.04 KG/M2

## 2023-07-18 DIAGNOSIS — J44.9 CHRONIC OBSTRUCTIVE PULMONARY DISEASE, UNSPECIFIED COPD TYPE: ICD-10-CM

## 2023-07-18 DIAGNOSIS — R06.09 DOE (DYSPNEA ON EXERTION): ICD-10-CM

## 2023-07-18 LAB
AV INDEX (PROSTH): 1.04
AV MEAN GRADIENT: 2 MMHG
AV PEAK GRADIENT: 5 MMHG
AV VALVE AREA: 3.33 CM2
AV VELOCITY RATIO: 0.73
BSA FOR ECHO PROCEDURE: 2.18 M2
CV ECHO LV RWT: 0.71 CM
DOP CALC AO PEAK VEL: 1.14 M/S
DOP CALC AO VTI: 20.9 CM
DOP CALC LVOT AREA: 3.2 CM2
DOP CALC LVOT DIAMETER: 2.02 CM
DOP CALC LVOT PEAK VEL: 0.83 M/S
DOP CALC LVOT STROKE VOLUME: 69.51 CM3
DOP CALC MV VTI: 32.6 CM
DOP CALCLVOT PEAK VEL VTI: 21.7 CM
E WAVE DECELERATION TIME: 264.98 MSEC
E/A RATIO: 1.81
ECHO LV POSTERIOR WALL: 1.45 CM (ref 0.6–1.1)
EJECTION FRACTION: 60 %
FRACTIONAL SHORTENING: 38 % (ref 28–44)
HR MV ECHO: 72 BPM
INTERVENTRICULAR SEPTUM: 1.54 CM (ref 0.6–1.1)
LEFT ATRIUM SIZE: 4.59 CM
LEFT ATRIUM VOLUME INDEX MOD: 28.4 ML/M2
LEFT ATRIUM VOLUME MOD: 60.17 CM3
LEFT INTERNAL DIMENSION IN SYSTOLE: 2.55 CM (ref 2.1–4)
LEFT VENTRICLE DIASTOLIC VOLUME INDEX: 34.97 ML/M2
LEFT VENTRICLE DIASTOLIC VOLUME: 74.13 ML
LEFT VENTRICLE MASS INDEX: 113 G/M2
LEFT VENTRICLE SYSTOLIC VOLUME INDEX: 11.1 ML/M2
LEFT VENTRICLE SYSTOLIC VOLUME: 23.49 ML
LEFT VENTRICULAR INTERNAL DIMENSION IN DIASTOLE: 4.1 CM (ref 3.5–6)
LEFT VENTRICULAR MASS: 239.78 G
LV LATERAL E/E' RATIO: 14 M/S
LVOT MG: 1.54 MMHG
LVOT MV: 0.58 CM/S
MR PISA EROA: 0.51 CM2
MV MEAN GRADIENT: 3 MMHG
MV PEAK A VEL: 0.62 M/S
MV PEAK E VEL: 1.12 M/S
MV PEAK GRADIENT: 6 MMHG
MV VALVE AREA BY CONTINUITY EQUATION: 2.13 CM2
PISA MRMAX VEL: 6.48 M/S
PISA RADIUS: 1.13 CM
PISA TR MAX VEL: 2.84 M/S
PISA VN NYQUIST MS: 0.41 M/S
PISA VN NYQUIST: 0.41 M/S
RA PRESSURE: 3 MMHG
RA WIDTH: 4.8 CM
TDI LATERAL: 0.08 M/S
TR MAX PG: 32 MMHG
TRICUSPID ANNULAR PLANE SYSTOLIC EXCURSION: 2.81 CM
TV REST PULMONARY ARTERY PRESSURE: 35 MMHG

## 2023-07-18 PROCEDURE — 93306 TTE W/DOPPLER COMPLETE: CPT

## 2023-07-18 PROCEDURE — 94729 DIFFUSING CAPACITY: CPT

## 2023-07-18 PROCEDURE — 94060 EVALUATION OF WHEEZING: CPT

## 2023-07-18 PROCEDURE — 94726 PLETHYSMOGRAPHY LUNG VOLUMES: CPT

## 2023-07-18 PROCEDURE — 94640 AIRWAY INHALATION TREATMENT: CPT | Mod: 59

## 2023-07-20 LAB
DLCO SINGLE BREATH LLN: 21.53
DLCO SINGLE BREATH PRE REF: 58.2 %
DLCO SINGLE BREATH REF: 28.45
DLCOC SBVA LLN: 2.97
DLCOC SBVA REF: 4.23
DLCOC SINGLE BREATH LLN: 21.53
DLCOC SINGLE BREATH REF: 28.45
DLCOVA LLN: 2.97
DLCOVA PRE REF: 81.1 %
DLCOVA PRE: 3.43 ML/(MIN*MMHG*L) (ref 2.97–5.5)
DLCOVA REF: 4.23
ERV LLN: -16448.8
ERV PRE REF: 71.2 %
ERV REF: 1.2
FEF 25 75 CHG: 9.5 %
FEF 25 75 LLN: 1.2
FEF 25 75 POST REF: 78.1 %
FEF 25 75 PRE REF: 71.3 %
FEF 25 75 REF: 2.7
FET100 CHG: 6.6 %
FEV1 CHG: 3.6 %
FEV1 FVC CHG: 0.8 %
FEV1 FVC LLN: 68
FEV1 FVC POST REF: 97.1 %
FEV1 FVC PRE REF: 96.3 %
FEV1 FVC REF: 79
FEV1 LLN: 2.21
FEV1 POST REF: 78.6 %
FEV1 PRE REF: 75.9 %
FEV1 REF: 3
FRCPLETH LLN: 2.46
FRCPLETH PREREF: 67.3 %
FRCPLETH REF: 3.45
FVC CHG: 2.7 %
FVC LLN: 2.85
FVC POST REF: 81 %
FVC PRE REF: 78.8 %
FVC REF: 3.78
IVC PRE: 3.11 L (ref 2.85–4.73)
IVC SINGLE BREATH LLN: 2.85
IVC SINGLE BREATH PRE REF: 82.2 %
IVC SINGLE BREATH REF: 3.78
MVV LLN: 114
MVV PRE REF: 50.6 %
MVV REF: 134
PEF CHG: -7.6 %
PEF LLN: 5.63
PEF POST REF: 51.9 %
PEF PRE REF: 56.1 %
PEF REF: 8.13
POST FEF 25 75: 2.1 L/S (ref 1.2–4.79)
POST FET 100: 6.83 SEC
POST FEV1 FVC: 76.85 % (ref 68.02–88.86)
POST FEV1: 2.35 L (ref 2.21–3.73)
POST FVC: 3.06 L (ref 2.85–4.73)
POST PEF: 4.22 L/S (ref 5.63–10.64)
PRE DLCO: 16.55 ML/(MIN*MMHG) (ref 21.53–35.38)
PRE ERV: 0.86 L (ref -16448.8–16451.2)
PRE FEF 25 75: 1.92 L/S (ref 1.2–4.79)
PRE FET 100: 6.41 SEC
PRE FEV1 FVC: 76.22 % (ref 68.02–88.86)
PRE FEV1: 2.27 L (ref 2.21–3.73)
PRE FRC PL: 2.32 L (ref 2.46–4.43)
PRE FVC: 2.98 L (ref 2.85–4.73)
PRE MVV: 67.6 L/MIN (ref 113.63–153.73)
PRE PEF: 4.57 L/S (ref 5.63–10.64)
PRE RV: 1.46 L (ref 1.57–2.92)
PRE TLC: 4.44 L (ref 5.57–7.87)
RAW LLN: 3.06
RAW PRE REF: 113.8 %
RAW PRE: 3.48 CMH2O*S/L (ref 3.06–3.06)
RAW REF: 3.06
RV LLN: 1.57
RV PRE REF: 65.2 %
RV REF: 2.24
RVTLC LLN: 26
RVTLC PRE REF: 92.9 %
RVTLC PRE: 32.91 % (ref 26.43–44.39)
RVTLC REF: 35
TLC LLN: 5.57
TLC PRE REF: 66.1 %
TLC REF: 6.72
VA PRE: 4.82 L (ref 6.57–6.57)
VA SINGLE BREATH LLN: 6.57
VA SINGLE BREATH PRE REF: 73.4 %
VA SINGLE BREATH REF: 6.57
VC LLN: 2.85
VC PRE REF: 78.8 %
VC PRE: 2.98 L (ref 2.85–4.73)
VC REF: 3.78

## 2023-09-05 ENCOUNTER — LAB VISIT (OUTPATIENT)
Dept: LAB | Facility: HOSPITAL | Age: 58
End: 2023-09-05
Attending: NURSE PRACTITIONER
Payer: MEDICAID

## 2023-09-05 DIAGNOSIS — E78.2 MIXED HYPERLIPIDEMIA: ICD-10-CM

## 2023-09-05 DIAGNOSIS — C61 PROSTATE CA: ICD-10-CM

## 2023-09-05 LAB
CHOLEST SERPL-MCNC: 130 MG/DL
CHOLEST/HDLC SERPL: 3 {RATIO} (ref 0–5)
FREE/TOTAL PSA (OLG): <0.3
HDLC SERPL-MCNC: 40 MG/DL (ref 35–60)
LDLC SERPL CALC-MCNC: 63 MG/DL (ref 50–140)
PSA FREE MFR SERPL: <32 %
PSA FREE SERPL-MCNC: <0.1 NG/ML
PSA SERPL-MCNC: 0.31 NG/ML
TRIGL SERPL-MCNC: 137 MG/DL (ref 34–140)
VLDLC SERPL CALC-MCNC: 27 MG/DL

## 2023-09-05 PROCEDURE — 84154 ASSAY OF PSA FREE: CPT

## 2023-09-05 PROCEDURE — 80061 LIPID PANEL: CPT

## 2023-09-05 PROCEDURE — 36415 COLL VENOUS BLD VENIPUNCTURE: CPT

## 2023-09-06 ENCOUNTER — OFFICE VISIT (OUTPATIENT)
Dept: UROLOGY | Facility: CLINIC | Age: 58
End: 2023-09-06
Payer: MEDICAID

## 2023-09-06 VITALS
HEART RATE: 77 BPM | HEIGHT: 68 IN | DIASTOLIC BLOOD PRESSURE: 78 MMHG | RESPIRATION RATE: 18 BRPM | OXYGEN SATURATION: 100 % | TEMPERATURE: 98 F | SYSTOLIC BLOOD PRESSURE: 133 MMHG | BODY MASS INDEX: 33.7 KG/M2 | WEIGHT: 222.38 LBS

## 2023-09-06 DIAGNOSIS — N40.1 BPH WITH OBSTRUCTION/LOWER URINARY TRACT SYMPTOMS: ICD-10-CM

## 2023-09-06 DIAGNOSIS — C61 PROSTATE CANCER: Primary | ICD-10-CM

## 2023-09-06 DIAGNOSIS — N13.8 BPH WITH OBSTRUCTION/LOWER URINARY TRACT SYMPTOMS: ICD-10-CM

## 2023-09-06 LAB
BILIRUB SERPL-MCNC: NORMAL MG/DL
BLOOD URINE, POC: NORMAL
COLOR, POC UA: YELLOW
GLUCOSE UR QL STRIP: NORMAL
KETONES UR QL STRIP: NORMAL
LEUKOCYTE ESTERASE URINE, POC: NORMAL
NITRITE, POC UA: NORMAL
PH, POC UA: 5.5
PROTEIN, POC: NORMAL
SPECIFIC GRAVITY, POC UA: 1.02
UROBILINOGEN, POC UA: 0.2

## 2023-09-06 PROCEDURE — 3008F PR BODY MASS INDEX (BMI) DOCUMENTED: ICD-10-PCS | Mod: CPTII,,, | Performed by: UROLOGY

## 2023-09-06 PROCEDURE — 3078F DIAST BP <80 MM HG: CPT | Mod: CPTII,,, | Performed by: UROLOGY

## 2023-09-06 PROCEDURE — 96402 CHEMO HORMON ANTINEOPL SQ/IM: CPT | Mod: PBBFAC

## 2023-09-06 PROCEDURE — 3044F PR MOST RECENT HEMOGLOBIN A1C LEVEL <7.0%: ICD-10-PCS | Mod: CPTII,,, | Performed by: UROLOGY

## 2023-09-06 PROCEDURE — 81001 URINALYSIS AUTO W/SCOPE: CPT | Mod: PBBFAC | Performed by: UROLOGY

## 2023-09-06 PROCEDURE — 3075F PR MOST RECENT SYSTOLIC BLOOD PRESS GE 130-139MM HG: ICD-10-PCS | Mod: CPTII,,, | Performed by: UROLOGY

## 2023-09-06 PROCEDURE — 1160F RVW MEDS BY RX/DR IN RCRD: CPT | Mod: CPTII,,, | Performed by: UROLOGY

## 2023-09-06 PROCEDURE — 1160F PR REVIEW ALL MEDS BY PRESCRIBER/CLIN PHARMACIST DOCUMENTED: ICD-10-PCS | Mod: CPTII,,, | Performed by: UROLOGY

## 2023-09-06 PROCEDURE — 3008F BODY MASS INDEX DOCD: CPT | Mod: CPTII,,, | Performed by: UROLOGY

## 2023-09-06 PROCEDURE — 99214 PR OFFICE/OUTPT VISIT, EST, LEVL IV, 30-39 MIN: ICD-10-PCS | Mod: S$PBB,,, | Performed by: UROLOGY

## 2023-09-06 PROCEDURE — 1159F PR MEDICATION LIST DOCUMENTED IN MEDICAL RECORD: ICD-10-PCS | Mod: CPTII,,, | Performed by: UROLOGY

## 2023-09-06 PROCEDURE — 3044F HG A1C LEVEL LT 7.0%: CPT | Mod: CPTII,,, | Performed by: UROLOGY

## 2023-09-06 PROCEDURE — 3078F PR MOST RECENT DIASTOLIC BLOOD PRESSURE < 80 MM HG: ICD-10-PCS | Mod: CPTII,,, | Performed by: UROLOGY

## 2023-09-06 PROCEDURE — 1159F MED LIST DOCD IN RCRD: CPT | Mod: CPTII,,, | Performed by: UROLOGY

## 2023-09-06 PROCEDURE — 3075F SYST BP GE 130 - 139MM HG: CPT | Mod: CPTII,,, | Performed by: UROLOGY

## 2023-09-06 PROCEDURE — 99215 OFFICE O/P EST HI 40 MIN: CPT | Mod: PBBFAC,25 | Performed by: UROLOGY

## 2023-09-06 PROCEDURE — 99214 OFFICE O/P EST MOD 30 MIN: CPT | Mod: S$PBB,,, | Performed by: UROLOGY

## 2023-09-06 RX ADMIN — LEUPROLIDE ACETATE 45 MG: KIT at 09:09

## 2023-09-06 NOTE — PROGRESS NOTES
Pt seen by Dr. Ramirez; Lupron injection given LGM w/no complications (Lot #: [id number]; Exp: 07/22/2025); Pt instructed to return to clinic in 6 months w/labs & possible Lupron injection; Discharge paperwork given w/pt verbalizing understanding

## 2023-09-06 NOTE — PROGRESS NOTES
CC:  Prostate cancer    HPI:  Burton Vasquez is a 57 y.o. male seen for follow-up of prostate cancer.  He was diagnosed with prostate cancer in September 2020.  His initial PSA was 117.  Biopsy showed [place] 4+3.  MRI showed extracapsular extension and invasion of the seminal vesicles was strongly suspected.  He had EBRT from 19 October 2020 to 10 December 2020. Lupron was originally scheduled to go for three years.  He missed an injection in 2021 and the PSA immediately odalis to seven and he began receiving Lupron again..  The PSA then came down.  He was being followed by Oncology and I reviewed their last note dated 5 December 2022.  He is on tamsulosin for urinary frequency, nocturia, urgency.  This is helping with his symptoms.     Urinalysis:  Results for orders placed or performed in visit on 09/06/23   POCT URINE DIPSTICK WITH MICROSCOPE, AUTOMATED   Result Value Ref Range    Color, UA Yellow     Spec Grav UA 1.025     pH, UA 5.5     WBC, UA neg     Nitrite, UA neg     Protein, POC neg     Glucose, UA neg     Ketones, UA neg     Urobilinogen, UA 0.2     Bilirubin, POC neg     Blood, UA moderate      Microscopic: 1-3 RBC per HPF    Lab Results:  PSA History:     Latest Reference Range & Units 08/19/22 13:28 12/05/22 12:49 03/06/23 07:40 09/05/23 08:00   PSA, Screen <=4.00 ng/mL 0.84 0.59 0.49 0.31       ROS:  All systems reviewed and are negative except as documented in HPI and/or Assessment and Plan.     Patient Active Problem List:     Patient Active Problem List   Diagnosis    Coronary artery disease involving native coronary artery of native heart without angina pectoris    Primary hypertension    Tobacco use    RICE (dyspnea on exertion)    Prostate CA    Anemia requiring transfusions    Esophagitis    Chronic hemorrhagic anemia    Iron deficiency anemia    Iron deficiency anemia due to chronic blood loss    Mixed hyperlipidemia    Olecranon bursitis of right elbow    BMI 34.0-34.9,adult    Chronic  obstructive pulmonary disease        Past Medical History:  Past Medical History:   Diagnosis Date    Hypertension     Prostate cancer     Stroke         Past Surgical History:  Past Surgical History:   Procedure Laterality Date    COLONOSCOPY  12/29/2016    COLONOSCOPY Left 10/12/2022    Procedure: COLONOSCOPY;  Surgeon: Meggan Lester MD;  Location: [facility] ENDOSCOPY;  Service: Gastroenterology;  Laterality: Left;        Family History:  Family History   Problem Relation Age of Onset    Hypertension Mother     Kidney failure Mother     Bone cancer Father         Social History:  Social History     Socioeconomic History    Marital status: Single   Tobacco Use    Smoking status: Every Day     Current packs/day: 0.50     Types: Cigarettes    Smokeless tobacco: Never   Substance and Sexual Activity    Alcohol use: Not Currently    Drug use: Never    Sexual activity: Not Currently        Allergies:  Review of patient's allergies indicates:  No Known Allergies     Objective:  Vitals:    09/06/23 0805   BP: 133/78   Pulse: 77   Resp: 18   Temp: 98 °F (36.7 °C)     General:  Well developed, well nourished adult male in no acute distress  Abdomen: Soft, nontender, no masses  Extremities:  No clubbing, cyanosis, or edema  Neurologic:  Grossly intact  Musculoskeletal:  Normal tone    Assessment:  1. Prostate cancer  - POCT URINE DIPSTICK WITH MICROSCOPE, AUTOMATED  - leuprolide acetate (6 month) injection 45 mg  - PSA, Total (Diagnostic); Future  - Testosterone; Future    2. BPH with obstruction/lower urinary tract symptoms     Plan:  The Lupron injection today will complete the three years of androgen ablation planned with the radiation.  Will try stopping the Lupron at that time.  Continue tamsulosin.    Follow-up:  PSA and testosterone in six months and follow-up after.

## 2023-09-07 ENCOUNTER — OFFICE VISIT (OUTPATIENT)
Dept: INTERNAL MEDICINE | Facility: CLINIC | Age: 58
End: 2023-09-07
Payer: MEDICAID

## 2023-09-07 VITALS
HEART RATE: 99 BPM | RESPIRATION RATE: 18 BRPM | DIASTOLIC BLOOD PRESSURE: 85 MMHG | TEMPERATURE: 98 F | OXYGEN SATURATION: 98 % | BODY MASS INDEX: 33.4 KG/M2 | WEIGHT: 220.38 LBS | HEIGHT: 68 IN | SYSTOLIC BLOOD PRESSURE: 133 MMHG

## 2023-09-07 DIAGNOSIS — J98.4 RESTRICTIVE LUNG DISEASE: ICD-10-CM

## 2023-09-07 DIAGNOSIS — R06.09 DOE (DYSPNEA ON EXERTION): ICD-10-CM

## 2023-09-07 DIAGNOSIS — Z72.0 TOBACCO USE: Primary | ICD-10-CM

## 2023-09-07 DIAGNOSIS — I10 PRIMARY HYPERTENSION: ICD-10-CM

## 2023-09-07 DIAGNOSIS — R73.03 PREDIABETES: ICD-10-CM

## 2023-09-07 DIAGNOSIS — J44.9 CHRONIC OBSTRUCTIVE PULMONARY DISEASE, UNSPECIFIED COPD TYPE: ICD-10-CM

## 2023-09-07 PROCEDURE — 99214 OFFICE O/P EST MOD 30 MIN: CPT | Mod: PBBFAC | Performed by: FAMILY MEDICINE

## 2023-09-07 RX ORDER — IBUPROFEN 200 MG
1 TABLET ORAL DAILY
Qty: 30 PATCH | Refills: 0 | Status: SHIPPED | OUTPATIENT
Start: 2023-09-07 | End: 2023-10-07

## 2023-09-07 RX ORDER — NICOTINE 7MG/24HR
1 PATCH, TRANSDERMAL 24 HOURS TRANSDERMAL DAILY
Qty: 30 PATCH | Refills: 0 | Status: SHIPPED | OUTPATIENT
Start: 2023-11-06 | End: 2023-12-06

## 2023-09-07 RX ORDER — MICONAZOLE NITRATE 2 %
2 CREAM (GRAM) TOPICAL
Qty: 190 EACH | Refills: 0 | Status: SHIPPED | OUTPATIENT
Start: 2023-09-07

## 2023-09-07 RX ORDER — IBUPROFEN 200 MG
1 TABLET ORAL DAILY
Qty: 30 PATCH | Refills: 0 | Status: SHIPPED | OUTPATIENT
Start: 2023-10-07 | End: 2023-11-06

## 2023-09-07 NOTE — PROGRESS NOTES
PROGRESS NOTE  Ambulatory Clinic  Burton Vasquez [id number] 9/7/2023  Chief Complaint  Follow-up     HPI  Burton Vasquez is a 57 y.o. male who has a past medical history of Hypertension, Prostate cancer, and Stroke.  He presents to clinic today for follow-up of chronic medical conditions. Sob while work in yard. Heart US. Reports he is hoping to quit smoking has gone down from a pack to 4-5 ciggarettes a day.  Patient reports that his shortness of breath is only on exertion, he denies any chest pain.  He does note that he notices nighttime wheezing.  He denies any frequent cough.  His PFT demonstrated only moderate restrictive pattern, no COPD, no interstitial lung disease pattern CT chest for lung cancer screening was negative for any acute findings and 12 month follow-up was recommended.  He states that he is more ready to quit smoking at this time and is willing to start trying nicotine replacement therapy.    ROS  Constitutional: no fever, fatigue, weakness  Eye: no vision loss, eye redness, drainage, or pain  ENMT: no sore throat, ear pain, sinus pain/congestion, nasal congestion/drainage  Respiratory: no cough, no wheezing, dyspnea on exertion  Cardiovascular: no chest pain, no palpitations, no edema  Gastrointestinal: no nausea, vomiting, or diarrhea. No abdominal pain  Genitourinary: no dysuria, no urinary frequency or urgency, no hematuria  Hema/Lymph: no abnormal bruising or bleeding  Endocrine: no heat or cold intolerance, no excessive thirst or excessive urination  Musculoskeletal: no muscle or joint pain, no joint swelling  Integumentary: no skin rash or abnormal lesion  Neurologic: no headache, no dizziness, no weakness or numbness     PE  Vitals:    09/07/23 1029   BP: 133/85   Pulse:    Resp:    Temp:       GA: Alert, comfortable, no acute distress.   HEENT: Adequate dentition. No visible oropharyngeal abnormalities. No scleral icterus or JVD. No visible thyromegally.   Pulmonary: Chest wall symmetric.  No accessory muscle use. No abnormalities on percussion. No tenderness to palpation. Clear to auscultation A/P/L bilaterally. No wheezing, crackles, or rhonchi.  Cardiac: RRR S1 & S2 w/o rubs/murmurs/gallops. No lower extremity edema.   Abdominal: Bowel sounds present x 4. No appreciable hepatosplenomegaly.  Skin: No jaundice, rashes, or visible lesions.  Lymphatic: No cervical or inguinal lymphadenopathy.  Musculoskeletal: Moves all extremities 5/5.  Extremities: No clubbing or cyanosis.  Neuro: CN grossly intact, normal gait, normal coordination, no sensory or motor deficits    Assessment/Plan  Gastritis  Esophagitis  Anemia secondary to above  -symptoms resolved, continues to take PPI  -hemoglobin stable and continues to improve.    Dyspnea on exertion  Coronary artery disease  Hypertension  Elevated ASCVD risk score  Nicotine use disorder  Obesity  Prediabetes  - Counseled on diet and exercise  -currently not interested in weight loss medications  -started taking amlodipine  -patient continues to smoke and he reports that he is ready to quit smoking will order smoking cessation program and start on nicotine patches as well as nicotine gum  -last [facility] 2021  -echo was ordered by Cardiology but patient has not scheduled this yet  -BNP was normal, chest x-ray was unremarkable, chest CT low dose was also unremarkable  -patient reports improvement in shortness a breath  PFT demonstrates moderate restrictive pathology no underlying COPD    History of prostate cancer s/p prostatectomy with subsequent low testosterone  -Continue following with Oncology    Preventative  DM (age>45 or HTN): repeat today  Lipid (age>35): ordered today  Declined STD screening  Lung Ca (30PY smoker age 55-79 or quit in last 15y):  Repeat in June 2023  Colon Ca: (age 50-75-annual FOBT, 5y flex + FOBTq3 or 10y c-scope):  Last colonoscopy 2022- for any malignancy repeat in 2032  Immunizations (generally - flu, shingrix, t-dap, PCV, Covid):   Declined all vaccines at this time      RTC in 3 months.    Future Appointments   Date Time Provider Department Center   10/6/2023 11:15 AM Luz Acosta FNP [facility] CARD [place] [facility]   3/6/2024  8:30 AM Birdie Ramirez DO [facility] UROLO [place]    3/18/2024  8:50 AM Alan Cuadra MD [facility] IM RES [place] Un       Alan Cuadra MD - PGY2  [employer]  [facility]

## 2023-10-03 NOTE — PROGRESS NOTES
CHIEF COMPLAINT:   Chief Complaint   Patient presents with    F/U 4 month visit     Patient c/o having SOB on exertion relived with rest                     Review of patient's allergies indicates:  No Known Allergies                                       HPI:  Burton Vasquez 57 y.o. male with a past medical history of Hx NSTEMI, CAD,nonobstructive per [facility] Dec. 2021, HTN, HLD, gastritis, esophagitis, prostate cancer status post radiation, chemotherapy, and Tobacco Abuse who presents for routine follow up and results of testing.  At his last clinic visit the patient endorsed shortness of breath with exertion and intermittent bilateral lower extremity edema.  He completed a subsequent echocardiogram on 7.18.23 that revealed an estimated ejection fraction of 60%, grade 1 left ventricular diastolic dysfunction, moderate MR moderate TR, which is stable to his previous Echo in June 2022.     Today the patient presents to clinic reporting that he feels well overall.  He endorses significant improvement in his shortness of breath since last seen in clinic.  He states that it now typically occurs only with over exertion.  The patient is able to perform his normal ADLs and regular activities without issue.  He denies any chest pain, palpitations, orthopnea, PND, peripheral edema, claudication, lightheadedness or syncope.  He has reduced his smoking to approximately 1-2 cigarettes a day and states that he is working towards complete cessation.    Background Information:  Patient was noted to have a hospital admission from October 2021 with NSTEMI, troponin peak 0.12. He completed a Lexiscan stress test on 10.4.21 that revealed a small area of mild inferior ischemia.  Patient underwent a subsequent coronary angiogram procedure on 12.8.21 which revealed nonobstructive coronary artery disease. He was also noted to have positive blood cultures for Streptococcus bacteremia and underwent a subsequent TAQUERIA on 10.8.21 in which a 12 x 7  mm echodensity was noted on MV (consistent with being a vegetation).  The patient completed a course of antibiotics and has had repeat negative cultures.      CARDIAC TESTING:  ECHO 7.18.23  The left ventricle is normal in size with moderate concentric hypertrophy and normal systolic function.  Normal right ventricular size with normal right ventricular systolic function.  The estimated ejection fraction is 60%.  Grade I left ventricular diastolic dysfunction.  Moderate mitral regurgitation.  Mild tricuspid regurgitation.  Normal central venous pressure (3 mmHg).  The estimated PA systolic pressure is 35 mmHg.  Mild left atrial enlargement.    Echocardiogram 6.17.22:  The left ventricle is normal in size with mild concentric hypertrophy and normal systolic function.  The estimated ejection fraction is 60%.  Normal left ventricular diastolic function.  Normal right ventricular size with normal right ventricular systolic function.  Mild right atrial enlargement.  Moderate mitral regurgitation.  Mild tricuspid regurgitation.  Moderate left atrial enlargement.    Coronary Angiogram December 8, 2021:  Left main: Large vessel that tapers distally. No stenosis.  LAD: Large vessel. 20 to 30% proximal/mid segment stenosis.  Diagonal 1: Small vessel. No stenosis.  Diagonal 2: Tiny size vessel. No stenosis.  Circumflex: Large vessel. No stenosis.  Obtuse marginal 1: Small vessel. 50% ostial stenosis.  Left PDA: Small size vessel. No stenosis.  RCA: Medium sized vessel. 2 sequential lesions, both with 50% stenosis mid vessel.  PLB: Tiny to small vessel. No stenosis.  PDA: Tiny to small vessel. No stenosis.  Angiogram summary:  Coronaries: Nonobstructive coronary artery disease  LVEDP: Normal end-diastolic pressure.    TTE 10.1.21  Left ventricular ejection fraction is measured approximately 50 to 55%  Structurally normal mitral valve  Mild mitral regurgitation  Structurally normal trileaflet aortic valve  Tricuspid valve is  structurally normal  There is mild tricuspid regurgitation with RVSP estimated at 28 mmHg  The pulmonic valve was not well visualized  Normal size left atrium  Normal right atrial size  Normal right ventricular size with preserved RV function                                                                                                                                                                                                                                                                                                                                                                                                                                                                         Patient Active Problem List   Diagnosis    Coronary artery disease involving native coronary artery of native heart without angina pectoris    Primary hypertension    Tobacco use    RICE (dyspnea on exertion)    Prostate CA    Anemia requiring transfusions    Esophagitis    Chronic hemorrhagic anemia    Iron deficiency anemia    Iron deficiency anemia due to chronic blood loss    Mixed hyperlipidemia    Olecranon bursitis of right elbow    BMI 34.0-34.9,adult    Chronic obstructive pulmonary disease    Restrictive lung disease     Past Surgical History:   Procedure Laterality Date    COLONOSCOPY  12/29/2016    COLONOSCOPY Left 10/12/2022    Procedure: COLONOSCOPY;  Surgeon: Meggan Lester MD;  Location: [facility] ENDOSCOPY;  Service: Gastroenterology;  Laterality: Left;     Social History     Socioeconomic History    Marital status: Single   Tobacco Use    Smoking status: Every Day     Current packs/day: 0.50     Types: Cigarettes    Smokeless tobacco: Never   Substance and Sexual Activity    Alcohol use: Not Currently    Drug use: Never    Sexual activity: Not Currently        Family History   Problem Relation Age of Onset    Hypertension Mother     Kidney failure Mother     Bone cancer Father          Current Outpatient  Medications:     amLODIPine (NORVASC) 5 MG tablet, Take 1 tablet (5 mg total) by mouth once daily., Disp: 30 tablet, Rfl: 11    aspirin (ECOTRIN) 81 MG EC tablet, Take 1 tablet (81 mg total) by mouth once daily., Disp: 90 tablet, Rfl: 3    atorvastatin (LIPITOR) 40 MG tablet, Take 1 tablet (40 mg total) by mouth once daily., Disp: 90 tablet, Rfl: 3    ergocalciferol (ERGOCALCIFEROL) 50,000 unit Cap, Take 50,000 Int'l Units by mouth., Disp: , Rfl:     metoprolol succinate (TOPROL-XL) 50 MG 24 hr tablet, Take 1 tablet (50 mg total) by mouth once daily., Disp: 90 tablet, Rfl: 3    mometasone-formoterol 50-5 mcg/actuation HFAA, Inhale 1 puff into the lungs every 8 (eight) hours as needed (wheezing SOB)., Disp: 13 g, Rfl: 3    [START ON 10/7/2023] nicotine (NICODERM CQ) 14 mg/24 hr, Place 1 patch onto the skin once daily., Disp: 30 patch, Rfl: 0    pantoprazole (PROTONIX) 40 MG tablet, Take 1 tablet (40 mg total) by mouth once daily., Disp: 30 tablet, Rfl: 11    tamsulosin (FLOMAX) 0.4 mg Cap, Take 1 capsule (0.4 mg total) by mouth once daily., Disp: 90 capsule, Rfl: 3    alprostadiL (MUSE) 250 MCG pellet, Place 250 mcg into the urethra as needed., Disp: , Rfl:     nicotine (NICODERM CQ) 21 mg/24 hr, Place 1 patch onto the skin once daily. (Patient not taking: Reported on 10/6/2023), Disp: 30 patch, Rfl: 0    [START ON 11/6/2023] nicotine (NICODERM CQ) 7 mg/24 hr, Place 1 patch onto the skin once daily. (Patient not taking: Reported on 10/6/2023), Disp: 30 patch, Rfl: 0    nicotine, polacrilex, (NICORETTE) 2 mg Gum, Take 1 each (2 mg total) by mouth as needed. (Patient not taking: Reported on 10/6/2023), Disp: 190 each, Rfl: 0     ROS:                                                                                                                                                                             Review of Systems   Constitutional: Negative.    Respiratory:  Positive for shortness of breath.    Gastrointestinal:  "Negative.    Musculoskeletal: Negative.    Skin: Negative.    Psychiatric/Behavioral: Negative.          Blood pressure 137/88, pulse 94, temperature 99 °F (37.2 °C), temperature source Oral, resp. rate 18, height 5' 8" (1.727 m), weight 101.3 kg (223 lb 5.2 oz), SpO2 99 %.   PE:  Physical Exam  Constitutional:       Appearance: Normal appearance.   HENT:      Head: Normocephalic.   Eyes:      Extraocular Movements: Extraocular movements intact.   Cardiovascular:      Rate and Rhythm: Normal rate and regular rhythm.      Heart sounds: Murmur heard.   Pulmonary:      Effort: Pulmonary effort is normal.   Abdominal:      Palpations: Abdomen is soft.   Musculoskeletal:         General: Normal range of motion.      Cervical back: Neck supple.      Left lower leg: Edema present.   Skin:     General: Skin is warm and dry.   Neurological:      General: No focal deficit present.      Mental Status: He is alert and oriented to person, place, and time.   Psychiatric:         Mood and Affect: Mood normal.        ASSESSMENT/PLAN:  Coronary Artery Disease - nonobstructive per Coronary Angiogram 12.8.21  - Hx NSTEMI in Oct. 2021  - Lexiscan stress test- small area of mild inferior ischemia with reversibility (10.4.21)  - LVEF- 60% per ECHO 7.18.23  - LVEF 60% per Echo 6.17.22  - Denies any CP.   - Continue Aspirin, Atorvastatin, metoprolol succinate, and SL nitro prn  - Counseled on heart healthy diet, exercise, and smoking cessation    RICE -Improved   - LVEF- 60% per ECHO 7.18.23  - EF 60% per Echo 6.17.22    Mitral Regurgitation  - EF-60%, moderate MR, mild TR per ECHO 7.18.23  - Moderate MR per Echo 6.17.22  - Will continue to monitor with serial echocardiograms at least every 1-2 years    HTN   - BP at goal  - Continue Metoprolol Succinate 50mg daily and amlodipine 5 mg   - Counseled on low sodium diet and exercise    HLD  - LDL -63- lipid panel now at goal   - Continue Atorvastatin 40mg PO QD   - Counseled on low " cholesterol/low fat diet and exercise as tolerated    Tobacco Use  - Report smokes 1-2 cigarettes per day day is working on weaning on his own   - Counseled on the importance of smoking cessation    Prostate Cancer  - Follow up with Oncology as directed      EKG   Follow up in Cardiology Clinic in 6 months or sooner if needed    Follow up with PCP and Oncology as directed

## 2023-10-06 ENCOUNTER — OFFICE VISIT (OUTPATIENT)
Dept: CARDIOLOGY | Facility: CLINIC | Age: 58
End: 2023-10-06
Payer: MEDICAID

## 2023-10-06 VITALS
HEART RATE: 94 BPM | BODY MASS INDEX: 33.84 KG/M2 | TEMPERATURE: 99 F | RESPIRATION RATE: 18 BRPM | DIASTOLIC BLOOD PRESSURE: 88 MMHG | HEIGHT: 68 IN | WEIGHT: 223.31 LBS | SYSTOLIC BLOOD PRESSURE: 137 MMHG | OXYGEN SATURATION: 99 %

## 2023-10-06 DIAGNOSIS — E78.2 MIXED HYPERLIPIDEMIA: ICD-10-CM

## 2023-10-06 DIAGNOSIS — I10 PRIMARY HYPERTENSION: ICD-10-CM

## 2023-10-06 DIAGNOSIS — I25.10 CORONARY ARTERY DISEASE INVOLVING NATIVE CORONARY ARTERY OF NATIVE HEART WITHOUT ANGINA PECTORIS: Primary | ICD-10-CM

## 2023-10-06 DIAGNOSIS — R06.09 DOE (DYSPNEA ON EXERTION): ICD-10-CM

## 2023-10-06 DIAGNOSIS — Z72.0 TOBACCO USE: ICD-10-CM

## 2023-10-06 PROCEDURE — 3079F PR MOST RECENT DIASTOLIC BLOOD PRESSURE 80-89 MM HG: ICD-10-PCS | Mod: CPTII,,, | Performed by: NURSE PRACTITIONER

## 2023-10-06 PROCEDURE — 3008F PR BODY MASS INDEX (BMI) DOCUMENTED: ICD-10-PCS | Mod: CPTII,,, | Performed by: NURSE PRACTITIONER

## 2023-10-06 PROCEDURE — 99214 PR OFFICE/OUTPT VISIT, EST, LEVL IV, 30-39 MIN: ICD-10-PCS | Mod: S$PBB,,, | Performed by: NURSE PRACTITIONER

## 2023-10-06 PROCEDURE — 3079F DIAST BP 80-89 MM HG: CPT | Mod: CPTII,,, | Performed by: NURSE PRACTITIONER

## 2023-10-06 PROCEDURE — 3044F HG A1C LEVEL LT 7.0%: CPT | Mod: CPTII,,, | Performed by: NURSE PRACTITIONER

## 2023-10-06 PROCEDURE — 1160F RVW MEDS BY RX/DR IN RCRD: CPT | Mod: CPTII,,, | Performed by: NURSE PRACTITIONER

## 2023-10-06 PROCEDURE — 99215 OFFICE O/P EST HI 40 MIN: CPT | Mod: PBBFAC | Performed by: NURSE PRACTITIONER

## 2023-10-06 PROCEDURE — 1159F PR MEDICATION LIST DOCUMENTED IN MEDICAL RECORD: ICD-10-PCS | Mod: CPTII,,, | Performed by: NURSE PRACTITIONER

## 2023-10-06 PROCEDURE — 3044F PR MOST RECENT HEMOGLOBIN A1C LEVEL <7.0%: ICD-10-PCS | Mod: CPTII,,, | Performed by: NURSE PRACTITIONER

## 2023-10-06 PROCEDURE — 3075F SYST BP GE 130 - 139MM HG: CPT | Mod: CPTII,,, | Performed by: NURSE PRACTITIONER

## 2023-10-06 PROCEDURE — 1160F PR REVIEW ALL MEDS BY PRESCRIBER/CLIN PHARMACIST DOCUMENTED: ICD-10-PCS | Mod: CPTII,,, | Performed by: NURSE PRACTITIONER

## 2023-10-06 PROCEDURE — 3008F BODY MASS INDEX DOCD: CPT | Mod: CPTII,,, | Performed by: NURSE PRACTITIONER

## 2023-10-06 PROCEDURE — 3075F PR MOST RECENT SYSTOLIC BLOOD PRESS GE 130-139MM HG: ICD-10-PCS | Mod: CPTII,,, | Performed by: NURSE PRACTITIONER

## 2023-10-06 PROCEDURE — 99214 OFFICE O/P EST MOD 30 MIN: CPT | Mod: S$PBB,,, | Performed by: NURSE PRACTITIONER

## 2023-10-06 PROCEDURE — 93005 ELECTROCARDIOGRAM TRACING: CPT

## 2023-10-06 PROCEDURE — 1159F MED LIST DOCD IN RCRD: CPT | Mod: CPTII,,, | Performed by: NURSE PRACTITIONER

## 2023-10-06 RX ORDER — ATORVASTATIN CALCIUM 40 MG/1
40 TABLET, FILM COATED ORAL DAILY
Qty: 90 TABLET | Refills: 3 | Status: SHIPPED | OUTPATIENT
Start: 2023-10-06 | End: 2024-10-05

## 2023-10-06 NOTE — PATIENT INSTRUCTIONS
EKG   Follow up in Cardiology Clinic in 6 months or sooner if needed    Follow up with PCP and Oncology as directed

## 2024-03-08 DIAGNOSIS — N40.1 BPH WITH OBSTRUCTION/LOWER URINARY TRACT SYMPTOMS: ICD-10-CM

## 2024-03-08 DIAGNOSIS — N13.8 BPH WITH OBSTRUCTION/LOWER URINARY TRACT SYMPTOMS: ICD-10-CM

## 2024-03-08 RX ORDER — TAMSULOSIN HYDROCHLORIDE 0.4 MG/1
1 CAPSULE ORAL DAILY
Qty: 90 CAPSULE | Refills: 3 | Status: SHIPPED | OUTPATIENT
Start: 2024-03-08

## 2024-03-18 ENCOUNTER — OFFICE VISIT (OUTPATIENT)
Dept: INTERNAL MEDICINE | Facility: CLINIC | Age: 59
End: 2024-03-18
Payer: MEDICAID

## 2024-03-18 VITALS
BODY MASS INDEX: 33.65 KG/M2 | HEIGHT: 68 IN | DIASTOLIC BLOOD PRESSURE: 80 MMHG | TEMPERATURE: 98 F | WEIGHT: 222 LBS | OXYGEN SATURATION: 99 % | RESPIRATION RATE: 20 BRPM | HEART RATE: 91 BPM | SYSTOLIC BLOOD PRESSURE: 151 MMHG

## 2024-03-18 DIAGNOSIS — I10 HYPERTENSION, UNSPECIFIED TYPE: ICD-10-CM

## 2024-03-18 DIAGNOSIS — R40.0 DAYTIME SOMNOLENCE: ICD-10-CM

## 2024-03-18 DIAGNOSIS — R06.09 DYSPNEA ON EXERTION: ICD-10-CM

## 2024-03-18 DIAGNOSIS — D50.0 IRON DEFICIENCY ANEMIA DUE TO CHRONIC BLOOD LOSS: ICD-10-CM

## 2024-03-18 DIAGNOSIS — Z72.0 TOBACCO USE: Primary | ICD-10-CM

## 2024-03-18 DIAGNOSIS — C61 PROSTATE CA: ICD-10-CM

## 2024-03-18 DIAGNOSIS — I27.20 PULMONARY HYPERTENSION: ICD-10-CM

## 2024-03-18 DIAGNOSIS — Z87.891 PERSONAL HISTORY OF NICOTINE DEPENDENCE: ICD-10-CM

## 2024-03-18 PROCEDURE — 99215 OFFICE O/P EST HI 40 MIN: CPT | Mod: PBBFAC | Performed by: FAMILY MEDICINE

## 2024-03-18 NOTE — PROGRESS NOTES
PROGRESS NOTE  Ambulatory Clinic  Burton Vasquez [id number] 3/18/2024  Chief Complaint  Follow-up (6 month follow up) and Medication Refill     HPI  Burton Vasquez is a 58 y.o. male who has a past medical history of Hypertension, Prostate cancer, and Stroke.  He presents to clinic today for follow-up of chronic medical conditions. Pt continues to report SOB with exertion.  He denies any chest pain, nausea, vomiting.  He does report that he has occasional small amounts of blood in stool. He has not followed up with GI since hospitalization.    ROS  Constitutional: no fever, fatigue, weakness  Eye: no vision loss, eye redness, drainage, or pain  ENMT: no sore throat, ear pain, sinus pain/congestion, nasal congestion/drainage  Respiratory: no cough, no wheezing, dyspnea on exertion  Cardiovascular: no chest pain, no palpitations, no edema  Gastrointestinal: no nausea, vomiting, or diarrhea. No abdominal pain  Genitourinary: no dysuria, no urinary frequency or urgency, no hematuria  Hema/Lymph: no abnormal bruising or bleeding  Endocrine: no heat or cold intolerance, no excessive thirst or excessive urination  Musculoskeletal: no muscle or joint pain, no joint swelling  Integumentary: no skin rash or abnormal lesion  Neurologic: no headache, no dizziness, no weakness or numbness     PE  Vitals:    03/18/24 0933   BP: (!) 151/80   Pulse:    Resp:    Temp:       GA: Alert, comfortable, no acute distress.   HEENT: Adequate dentition. No visible oropharyngeal abnormalities. No scleral icterus or JVD. No visible thyromegally.   Pulmonary: Chest wall symmetric. No accessory muscle use. No abnormalities on percussion. No tenderness to palpation. Clear to auscultation A/P/L bilaterally. No wheezing, crackles, or rhonchi.  Cardiac: RRR S1 & S2 w/o rubs/murmurs/gallops. No lower extremity edema.   Abdominal: Bowel sounds present x 4. No appreciable hepatosplenomegaly.  Skin: No jaundice, rashes, or visible lesions.  Lymphatic: No  cervical or inguinal lymphadenopathy.  Musculoskeletal: Moves all extremities 5/5.  Extremities: No clubbing or cyanosis.  Neuro: CN grossly intact, normal gait, normal coordination, no sensory or motor deficits    Assessment/Plan  Gastritis  Esophagitis  Anemia secondary to above  Blood in stool  -symptoms resolved, continues to take PPI  -hemoglobin stable and continues to improve.  -advised to f/u with GI    Dyspnea on exertion  Coronary artery disease  Hypertension  Elevated ASCVD risk score  Nicotine use disorder  Obesity  Prediabetes  - Counseled on diet and exercise  -currently not interested in weight loss medications  -started taking amlodipine  -patient continues to smoke and he reports that he is ready to quit smoking will order smoking cessation program and start on nicotine patches as well as nicotine gum  -last [facility] 2021  -echo was ordered by Cardiology but patient has not scheduled this yet  -BNP was normal, chest x-ray was unremarkable, chest CT low dose was also unremarkable  -patient reports improvement in shortness a breath  PFT demonstrates moderate restrictive pathology no underlying COPD  - pt did not take BP meds today has BP elevated    History of prostate cancer s/p prostatectomy with subsequent low testosterone  S/p XRT  -Continue following with Oncology  -missed first lupron injection  -f/u with urology  -s/p xrt  -dexa 7/11/22 WNL    PHTN  ESS16  Will screen for sleep apnea  No signs of DVT    Preventative  DM (age>45 or HTN): repeat today  Lipid (age>35): ordered today  Declined STD screening  Lung Ca (30PY smoker age 55-79 or quit in last 15y):  Repeat in June 2023  Colon Ca: (age 50-75-annual FOBT, 5y flex + FOBTq3 or 10y c-scope):  Last colonoscopy 2022- for any malignancy repeat in 2032  Immunizations (generally - flu, shingrix, t-dap, PCV, Covid):  Declined all vaccines at this time      RTC in 1 months.    Future Appointments   Date Time Provider Department Center   3/22/2024  9:30  Birdie Alvarez DO [facility]   4/9/2024 12:45 PM Luz Acosta, FNP [facility]       Alan Cuadra MD - PGY2  [employer]  [facility]

## 2024-03-21 ENCOUNTER — LAB VISIT (OUTPATIENT)
Dept: LAB | Facility: HOSPITAL | Age: 59
End: 2024-03-21
Attending: UROLOGY
Payer: MEDICAID

## 2024-03-21 DIAGNOSIS — R06.09 DYSPNEA ON EXERTION: ICD-10-CM

## 2024-03-21 DIAGNOSIS — C61 PROSTATE CANCER: ICD-10-CM

## 2024-03-21 LAB
BASOPHILS # BLD AUTO: 0.04 X10(3)/MCL
BASOPHILS NFR BLD AUTO: 0.6 %
CRP SERPL-MCNC: 6.4 MG/L
EOSINOPHIL # BLD AUTO: 0.24 X10(3)/MCL (ref 0–0.9)
EOSINOPHIL NFR BLD AUTO: 3.9 %
ERYTHROCYTE [DISTWIDTH] IN BLOOD BY AUTOMATED COUNT: 13.5 % (ref 11.5–17)
ERYTHROCYTE [SEDIMENTATION RATE] IN BLOOD: 22 MM/HR (ref 0–15)
HCT VFR BLD AUTO: 34.4 % (ref 42–52)
HGB BLD-MCNC: 11.4 G/DL (ref 14–18)
IMM GRANULOCYTES # BLD AUTO: 0.01 X10(3)/MCL (ref 0–0.04)
IMM GRANULOCYTES NFR BLD AUTO: 0.2 %
LYMPHOCYTES # BLD AUTO: 2.64 X10(3)/MCL (ref 0.6–4.6)
LYMPHOCYTES NFR BLD AUTO: 42.4 %
MCH RBC QN AUTO: 25.6 PG (ref 27–31)
MCHC RBC AUTO-ENTMCNC: 33.1 G/DL (ref 33–36)
MCV RBC AUTO: 77.3 FL (ref 80–94)
MONOCYTES # BLD AUTO: 0.32 X10(3)/MCL (ref 0.1–1.3)
MONOCYTES NFR BLD AUTO: 5.1 %
NEUTROPHILS # BLD AUTO: 2.97 X10(3)/MCL (ref 2.1–9.2)
NEUTROPHILS NFR BLD AUTO: 47.8 %
NRBC BLD AUTO-RTO: 0 %
PLATELET # BLD AUTO: 164 X10(3)/MCL (ref 130–400)
PMV BLD AUTO: 10.2 FL (ref 7.4–10.4)
PSA SERPL-MCNC: 0.16 NG/ML
RBC # BLD AUTO: 4.45 X10(6)/MCL (ref 4.7–6.1)
TESTOST SERPL-MCNC: 16.24 NG/DL (ref 220.91–715.81)
WBC # SPEC AUTO: 6.22 X10(3)/MCL (ref 4.5–11.5)

## 2024-03-21 PROCEDURE — 86140 C-REACTIVE PROTEIN: CPT

## 2024-03-21 PROCEDURE — 86038 ANTINUCLEAR ANTIBODIES: CPT

## 2024-03-21 PROCEDURE — 85025 COMPLETE CBC W/AUTO DIFF WBC: CPT

## 2024-03-21 PROCEDURE — 84153 ASSAY OF PSA TOTAL: CPT

## 2024-03-21 PROCEDURE — 36415 COLL VENOUS BLD VENIPUNCTURE: CPT

## 2024-03-21 PROCEDURE — 85652 RBC SED RATE AUTOMATED: CPT

## 2024-03-21 PROCEDURE — 86200 CCP ANTIBODY: CPT

## 2024-03-21 PROCEDURE — 84403 ASSAY OF TOTAL TESTOSTERONE: CPT

## 2024-03-22 ENCOUNTER — OFFICE VISIT (OUTPATIENT)
Dept: UROLOGY | Facility: CLINIC | Age: 59
End: 2024-03-22
Payer: MEDICAID

## 2024-03-22 VITALS
DIASTOLIC BLOOD PRESSURE: 92 MMHG | OXYGEN SATURATION: 100 % | HEART RATE: 94 BPM | SYSTOLIC BLOOD PRESSURE: 137 MMHG | BODY MASS INDEX: 33.19 KG/M2 | RESPIRATION RATE: 20 BRPM | WEIGHT: 219 LBS | HEIGHT: 68 IN | TEMPERATURE: 98 F

## 2024-03-22 DIAGNOSIS — C61 PROSTATE CANCER: Primary | ICD-10-CM

## 2024-03-22 DIAGNOSIS — N40.1 BPH WITH OBSTRUCTION/LOWER URINARY TRACT SYMPTOMS: ICD-10-CM

## 2024-03-22 DIAGNOSIS — N13.8 BPH WITH OBSTRUCTION/LOWER URINARY TRACT SYMPTOMS: ICD-10-CM

## 2024-03-22 LAB
BILIRUB SERPL-MCNC: NORMAL MG/DL
BLOOD URINE, POC: NORMAL
CCP IGG SERPL-ACNC: <15.6 U
COLOR, POC UA: YELLOW
GLUCOSE UR QL STRIP: NORMAL
IMMUNOLOGIST REVIEW: NORMAL
KETONES UR QL STRIP: NORMAL
LEUKOCYTE ESTERASE URINE, POC: NORMAL
NITRITE, POC UA: NORMAL
NUCLEAR IGG SERPL IA-ACNC: 0.3 U
PH, POC UA: 6
PROTEIN, POC: NORMAL
SPECIFIC GRAVITY, POC UA: 1.02
UROBILINOGEN, POC UA: 0.2

## 2024-03-22 PROCEDURE — 99214 OFFICE O/P EST MOD 30 MIN: CPT | Mod: S$PBB,,, | Performed by: UROLOGY

## 2024-03-22 PROCEDURE — 1159F MED LIST DOCD IN RCRD: CPT | Mod: CPTII,,, | Performed by: UROLOGY

## 2024-03-22 PROCEDURE — 3080F DIAST BP >= 90 MM HG: CPT | Mod: CPTII,,, | Performed by: UROLOGY

## 2024-03-22 PROCEDURE — 81001 URINALYSIS AUTO W/SCOPE: CPT | Mod: PBBFAC | Performed by: UROLOGY

## 2024-03-22 PROCEDURE — 1160F RVW MEDS BY RX/DR IN RCRD: CPT | Mod: CPTII,,, | Performed by: UROLOGY

## 2024-03-22 PROCEDURE — 3008F BODY MASS INDEX DOCD: CPT | Mod: CPTII,,, | Performed by: UROLOGY

## 2024-03-22 PROCEDURE — 3075F SYST BP GE 130 - 139MM HG: CPT | Mod: CPTII,,, | Performed by: UROLOGY

## 2024-03-22 PROCEDURE — 96402 CHEMO HORMON ANTINEOPL SQ/IM: CPT | Mod: PBBFAC

## 2024-03-22 PROCEDURE — 99214 OFFICE O/P EST MOD 30 MIN: CPT | Mod: PBBFAC | Performed by: UROLOGY

## 2024-03-22 RX ADMIN — LEUPROLIDE ACETATE 45 MG: KIT at 10:03

## 2024-03-22 NOTE — PROGRESS NOTES
Placed in room. Seen by Dr. Gallego. Spoke with patient. Lupron Depot 45 mg. Given  in RGM. LOT#[id number], expires January 11, 2026. RTC in 6 months.

## 2024-03-22 NOTE — PROGRESS NOTES
CC:  Prostate cancer    HPI:  Burton Vasquez is a 58 y.o. male seen for follow-up of prostate cancer.  He was diagnosed with prostate cancer in September 2020.  His initial PSA was 117.  Biopsy showed [place] 4+3.  MRI showed extracapsular extension and invasion of the seminal vesicles was strongly suspected.  He had EBRT from 19 October 2020 to 10 December 2020. Lupron was originally scheduled to go for three years.  He missed an injection in 2021 and the PSA immediately odalis to seven and he began receiving Lupron again. The PSA then came down.  He states that he is continuing to be followed by Oncology however I can not find a recent note.  He also states that he is taking Xtandi and I can not find any documentation for that.  He is on tamsulosin for urinary frequency, nocturia, urgency.  This is helping with his symptoms.     Urinalysis:  Results for orders placed or performed in visit on 09/06/23   POCT URINE DIPSTICK WITH MICROSCOPE, AUTOMATED   Result Value Ref Range    Color, UA Yellow     Spec Grav UA 1.025     pH, UA 5.5     WBC, UA neg     Nitrite, UA neg     Protein, POC neg     Glucose, UA neg     Ketones, UA neg     Urobilinogen, UA 0.2     Bilirubin, POC neg     Blood, UA moderate      Microscopic Urinalysis:  WBC:   None per HPF     RBC:    2-3 per HPF     Bacteria:    None per HPF     Squamous epithelial cells:    None per HPF      Crystals:   None    Lab Results:  PSA History:    08/31/20 11:15 01/07/21 08:00 04/12/21 07:54 09/02/21 13:34 12/23/21 12:00 01/07/22 12:00 08/19/22 13:28 12/05/22 12:49 03/06/23 07:40 09/05/23 08:00 03/21/24 11:41   117.0 (H) 5.09 (H) 4.03 (H) 2.55 7.21 (H) 5.97 (H) 0.84 0.59 0.49 0.31 0.16      Latest Reference Range & Units 01/07/22 12:00 05/16/22 15:23 12/05/22 12:49 03/06/23 07:40 03/21/24 11:41   Testosterone Total 220.91 - 715.81 ng/dL 200.66 (L) 13.08 (L) 16.22 (L) 21.23 (L) 16.24 (L)       ROS:  All systems reviewed and are negative except as documented in HPI  and/or Assessment and Plan.     Patient Active Problem List:     Patient Active Problem List   Diagnosis    Coronary artery disease involving native coronary artery of native heart without angina pectoris    Primary hypertension    Tobacco use    RICE (dyspnea on exertion)    Prostate CA    Anemia requiring transfusions    Esophagitis    Chronic hemorrhagic anemia    Iron deficiency anemia    Iron deficiency anemia due to chronic blood loss    Mixed hyperlipidemia    Olecranon bursitis of right elbow    BMI 34.0-34.9,adult    Chronic obstructive pulmonary disease    Restrictive lung disease        Past Medical History:  Past Medical History:   Diagnosis Date    Hypertension     Prostate cancer     Stroke         Past Surgical History:  Past Surgical History:   Procedure Laterality Date    COLONOSCOPY  12/29/2016    COLONOSCOPY Left 10/12/2022    Procedure: COLONOSCOPY;  Surgeon: Meggan Lester MD;  Location: [facility] ENDOSCOPY;  Service: Gastroenterology;  Laterality: Left;        Family History:  Family History   Problem Relation Age of Onset    Hypertension Mother     Kidney failure Mother     Bone cancer Father         Social History:  Social History     Socioeconomic History    Marital status: Single   Tobacco Use    Smoking status: Every Day     Current packs/day: 0.50     Types: Cigarettes     Passive exposure: Never    Smokeless tobacco: Never   Substance and Sexual Activity    Alcohol use: Not Currently    Drug use: Never    Sexual activity: Not Currently        Allergies:  Review of patient's allergies indicates:  No Known Allergies     Objective:  Vitals:    03/22/24 0959   BP: (!) 137/92   Pulse: 94   Resp: 20   Temp: 98.2 °F (36.8 °C)     General:  Well developed, well nourished adult male in no acute distress  Abdomen: Soft, nontender, no masses  Extremities:  No clubbing, cyanosis, or edema  Neurologic:  Grossly intact  Musculoskeletal:  Normal tone    Assessment:  1. Prostate cancer  - POCT URINE  DIPSTICK WITH MICROSCOPE, AUTOMATED  - leuprolide acetate (6 month) injection 45 mg  - PSA, Total (Diagnostic); Future  - Testosterone; Future    2. BPH with obstruction/lower urinary tract symptoms     Plan:  Lupron given today.  We will clarify at his next visit whether he is taking Xtandi and who is managing that.  Continue tamsulosin for BPH.    Follow-up:  Six months for Lupron with a PSA and testosterone.

## 2024-03-26 ENCOUNTER — TELEPHONE (OUTPATIENT)
Dept: INTERNAL MEDICINE | Facility: CLINIC | Age: 59
End: 2024-03-26
Payer: MEDICAID

## 2024-03-26 DIAGNOSIS — J98.4 RESTRICTIVE LUNG DISEASE: ICD-10-CM

## 2024-03-26 DIAGNOSIS — K20.90 ESOPHAGITIS: ICD-10-CM

## 2024-03-26 DIAGNOSIS — R49.9 CHANGE IN VOICE: Primary | ICD-10-CM

## 2024-03-26 DIAGNOSIS — K22.11 ESOPHAGEAL ULCER WITH BLEEDING: ICD-10-CM

## 2024-03-26 RX ORDER — PANTOPRAZOLE SODIUM 40 MG/1
40 TABLET, DELAYED RELEASE ORAL DAILY
Qty: 30 TABLET | Refills: 11 | Status: SHIPPED | OUTPATIENT
Start: 2024-03-26 | End: 2025-03-26

## 2024-03-26 NOTE — TELEPHONE ENCOUNTER
After further review of PFT and patients flow volume loop, he does not have a convincing reason to have restrictive lung disease and it appears that he also has variable obstruction which can be seen in vocal chord dysfunction, after further interview with the patient he notes he has had a change in his voice over the past year. He also smokes heavily and has acid reflux, thus I would like to have him seen by ENT to evaluate for possible vocal chord dysfunction, this waws relayed to the patient, and order has been placed.

## 2024-04-15 ENCOUNTER — OFFICE VISIT (OUTPATIENT)
Dept: INTERNAL MEDICINE | Facility: CLINIC | Age: 59
End: 2024-04-15
Payer: MEDICAID

## 2024-04-15 VITALS
HEART RATE: 102 BPM | HEIGHT: 67 IN | SYSTOLIC BLOOD PRESSURE: 129 MMHG | DIASTOLIC BLOOD PRESSURE: 77 MMHG | WEIGHT: 220 LBS | OXYGEN SATURATION: 98 % | BODY MASS INDEX: 34.53 KG/M2 | RESPIRATION RATE: 20 BRPM | TEMPERATURE: 98 F

## 2024-04-15 DIAGNOSIS — Z87.891 PERSONAL HISTORY OF NICOTINE DEPENDENCE: ICD-10-CM

## 2024-04-15 DIAGNOSIS — Z72.0 TOBACCO USE: ICD-10-CM

## 2024-04-15 DIAGNOSIS — I10 PRIMARY HYPERTENSION: Primary | ICD-10-CM

## 2024-04-15 DIAGNOSIS — I25.10 CORONARY ARTERY DISEASE INVOLVING NATIVE CORONARY ARTERY OF NATIVE HEART WITHOUT ANGINA PECTORIS: ICD-10-CM

## 2024-04-15 DIAGNOSIS — J98.4 RESTRICTIVE LUNG DISEASE: ICD-10-CM

## 2024-04-15 DIAGNOSIS — I27.20 PULMONARY HYPERTENSION: ICD-10-CM

## 2024-04-15 DIAGNOSIS — R49.9 CHANGE IN VOICE: ICD-10-CM

## 2024-04-15 PROCEDURE — 99213 OFFICE O/P EST LOW 20 MIN: CPT | Mod: PBBFAC | Performed by: FAMILY MEDICINE

## 2024-04-15 RX ORDER — NICOTINE 7MG/24HR
1 PATCH, TRANSDERMAL 24 HOURS TRANSDERMAL DAILY
Qty: 30 PATCH | Refills: 0 | Status: SHIPPED | OUTPATIENT
Start: 2024-04-15

## 2024-04-15 NOTE — PROGRESS NOTES
PROGRESS NOTE  Ambulatory Clinic  Burton Vasquez [id number] 4/15/2024  Chief Complaint  Follow-up (1 month follow up)     RALEIGH Vasquez is a 58 y.o. male who has a past medical history of Hypertension, Prostate cancer, and Stroke.  He presents to clinic today for follow-up of chronic medical conditions. Pt continues to report SOB with exertion.  He denies any chest pain, nausea, vomiting.  He does report that he has occasional small amounts of blood in stool. He has not followed up with GI since hospitalization.    ROS  Constitutional: no fever, fatigue, weakness  Eye: no vision loss, eye redness, drainage, or pain  ENMT: no sore throat, ear pain, sinus pain/congestion, nasal congestion/drainage  Respiratory: no cough, no wheezing, dyspnea on exertion  Cardiovascular: no chest pain, no palpitations, no edema  Gastrointestinal: no nausea, vomiting, or diarrhea. No abdominal pain  Genitourinary: no dysuria, no urinary frequency or urgency, no hematuria  Hema/Lymph: no abnormal bruising or bleeding  Endocrine: no heat or cold intolerance, no excessive thirst or excessive urination  Musculoskeletal: no muscle or joint pain, no joint swelling  Integumentary: no skin rash or abnormal lesion  Neurologic: no headache, no dizziness, no weakness or numbness     PE  Vitals:    04/15/24 1330   BP: 129/77   Pulse: 102   Resp: 20   Temp: 98.1 °F (36.7 °C)      GA: Alert, comfortable, no acute distress.   HEENT: Adequate dentition. No visible oropharyngeal abnormalities. No scleral icterus or JVD. No visible thyromegally.   Pulmonary: Chest wall symmetric. No accessory muscle use. No abnormalities on percussion. No tenderness to palpation. Clear to auscultation A/P/L bilaterally. No wheezing, crackles, or rhonchi.  Cardiac: RRR S1 & S2 w/o rubs/murmurs/gallops. No lower extremity edema.   Abdominal: Bowel sounds present x 4. No appreciable hepatosplenomegaly.  Skin: No jaundice, rashes, or visible lesions.  Lymphatic: No  cervical or inguinal lymphadenopathy.  Musculoskeletal: Moves all extremities 5/5.  Extremities: No clubbing or cyanosis.  Neuro: CN grossly intact, normal gait, normal coordination, no sensory or motor deficits    Assessment/Plan  Gastritis  Esophagitis  Anemia secondary to above  Blood in stool  -symptoms resolved, continues to take PPI  -hemoglobin stable and continues to improve.  -advised to f/u with GI    Dyspnea on exertion  Coronary artery disease  Hypertension  Elevated ASCVD risk score  Nicotine use disorder  Obesity  Prediabetes  - Counseled on diet and exercise  -currently not interested in weight loss medications  -started taking amlodipine  -patient continues to smoke and he reports that he is ready to quit smoking will order smoking cessation program and start on nicotine patches as well as nicotine gum  -last [facility] 2021  -echo was ordered by Cardiology but patient has not scheduled this yet  -BNP was normal, chest x-ray was unremarkable, chest CT low dose was also unremarkable  -patient reports improvement in shortness a breath  - PFT demonstrates moderate restrictive pathology no underlying COPD, flow volume loops appeared to have signs extrinsic variable obstruction, he was referred to ENT to rule out any vocal cord paralysis or pathology  - blood pressure stable today at 129/77 after taking blood pressure medications today  -will send 7 mg nicotine patches to help with nicotine cessation    History of prostate cancer s/p prostatectomy with subsequent low testosterone  S/p XRT  -Continue following with Oncology  -missed first lupron injection  -f/u with urology  -s/p xrt  -dexa 7/11/22 WNL    PHTN  ESS16  Will screen for sleep apnea  No signs of DVT    Preventative  DM (age>45 or HTN): repeat at next visit  Lipid (age>35): ldl 63 (7mo ago)  Declined STD screening  Lung Ca (30PY smoker age 55-79 or quit in last 15y):  Repeat in June 2023  Colon Ca: (age 50-75-annual FOBT, 5y flex + FOBTq3 or 10y  c-scope):  Last colonoscopy 2022- for any malignancy repeat in 2032  Immunizations (generally - flu, shingrix, t-dap, PCV, Covid):  Declined all vaccines at this time      RTC in 3 months.    Future Appointments   Date Time Provider Department Center   4/15/2024  1:50 PM Alan Cuadra MD [facility] IM RES [place]    4/17/2024 12:45 PM Luz Acosta FNP [facility] CARD [place]    4/25/2024  9:00 AM RESIDENT 2, [facility] OTORHINOLARYNGOLOGY [facility] ENT [place]   8/16/2024  8:00 AM Magnolia Alexander FNP [facility] GASTRO [place]    9/25/2024  9:15 AM Birdie Ramirez DO [facility] UROLO [place]     Alan Cuadra MD - PGY3  [employer] IM [place]

## 2024-04-17 ENCOUNTER — OFFICE VISIT (OUTPATIENT)
Dept: CARDIOLOGY | Facility: CLINIC | Age: 59
End: 2024-04-17
Payer: MEDICAID

## 2024-04-17 VITALS
HEART RATE: 90 BPM | BODY MASS INDEX: 33.52 KG/M2 | HEIGHT: 68 IN | SYSTOLIC BLOOD PRESSURE: 136 MMHG | RESPIRATION RATE: 18 BRPM | WEIGHT: 221.19 LBS | TEMPERATURE: 98 F | DIASTOLIC BLOOD PRESSURE: 86 MMHG

## 2024-04-17 DIAGNOSIS — R00.2 PALPITATIONS: ICD-10-CM

## 2024-04-17 DIAGNOSIS — I25.10 CORONARY ARTERY DISEASE INVOLVING NATIVE CORONARY ARTERY OF NATIVE HEART WITHOUT ANGINA PECTORIS: Primary | ICD-10-CM

## 2024-04-17 DIAGNOSIS — E78.2 MIXED HYPERLIPIDEMIA: ICD-10-CM

## 2024-04-17 DIAGNOSIS — R09.89 CAROTID BRUIT, UNSPECIFIED LATERALITY: ICD-10-CM

## 2024-04-17 DIAGNOSIS — I10 PRIMARY HYPERTENSION: ICD-10-CM

## 2024-04-17 DIAGNOSIS — Z72.0 TOBACCO USE: ICD-10-CM

## 2024-04-17 PROCEDURE — 1160F RVW MEDS BY RX/DR IN RCRD: CPT | Mod: CPTII,,, | Performed by: NURSE PRACTITIONER

## 2024-04-17 PROCEDURE — 3008F BODY MASS INDEX DOCD: CPT | Mod: CPTII,,, | Performed by: NURSE PRACTITIONER

## 2024-04-17 PROCEDURE — 99214 OFFICE O/P EST MOD 30 MIN: CPT | Mod: S$PBB,,, | Performed by: NURSE PRACTITIONER

## 2024-04-17 PROCEDURE — 99214 OFFICE O/P EST MOD 30 MIN: CPT | Mod: PBBFAC | Performed by: NURSE PRACTITIONER

## 2024-04-17 PROCEDURE — 1159F MED LIST DOCD IN RCRD: CPT | Mod: CPTII,,, | Performed by: NURSE PRACTITIONER

## 2024-04-17 PROCEDURE — 3079F DIAST BP 80-89 MM HG: CPT | Mod: CPTII,,, | Performed by: NURSE PRACTITIONER

## 2024-04-17 PROCEDURE — 3075F SYST BP GE 130 - 139MM HG: CPT | Mod: CPTII,,, | Performed by: NURSE PRACTITIONER

## 2024-04-17 NOTE — PATIENT INSTRUCTIONS
Carotid US  48 Hour Holter Monitor   Follow up in Cardiology Clinic in 4 months or sooner if needed    Follow up with PCP and Oncology as directed

## 2024-04-17 NOTE — PROGRESS NOTES
CHIEF COMPLAINT:   Chief Complaint   Patient presents with    Follow-up     Patient c/o having SOB on exertion relieved with rest                     Review of patient's allergies indicates:  No Known Allergies                                       HPI:  Burton Vasquez 58 y.o. male with a past medical history of Hx NSTEMI/Nonobstructive CAD per [facility] Dec. 2021, Hx CVA, HTN, HLD, gastritis, esophagitis, prostate cancer status post radiation, chemotherapy, and Tobacco Abuse who presents to cardiology clinic for routine follow up and ongoing care. Latest Echocardiogram obtained on 7.18.23 revealed a preserved EF of 60%, Grade 1 left ventricular Diastolic Dysfunction, moderate MR, and mild TR, which is stable  to previous Echo in June 2022.     Patient presents to clinic today continuing to endorse stable shortness of breath with exertion.  He states he is able to perform his ADLs without issue but endorses exertional dyspnea with moderate activities such as with regular housework.  However, he does note some improvement with his inhaler but states he has not utilized it for the past 2-3 weeks due to needing refills.  He denies any chest pain, orthopnea, PND, peripheral edema, claudication, lightheadedness, dizziness or syncope.  The patient does endorse occasional palpitations with activity in which he feels his heart race.  He is unsure of the duration of his palpitations but states it typically improves with rest.  He endorses compliance with his current medications and is currently tolerating without issue.  He continues to smoke approximately half a pack of cigarettes a day and states that he is working towards complete cessation.  He states that he was recently prescribed nicotine patches and will  the prescription today.    Background Information:  Patient was noted to have a hospital admission from October 2021 with NSTEMI, troponin peak 0.12. He completed a Lexiscan stress test on 10.4.21 that revealed a  small area of mild inferior ischemia.  Patient underwent a subsequent coronary angiogram procedure on 12.8.21 which revealed nonobstructive coronary artery disease. He was also noted to have positive blood cultures for Streptococcus bacteremia and underwent a subsequent TAQUERIA on 10.8.21 in which a 12 x 7 mm echodensity was noted on MV (consistent with being a vegetation).  The patient completed a course of antibiotics and has had repeat negative cultures.      CARDIAC TESTING:  ECHO 7.18.23  The left ventricle is normal in size with moderate concentric hypertrophy and normal systolic function.  Normal right ventricular size with normal right ventricular systolic function.  The estimated ejection fraction is 60%.  Grade I left ventricular diastolic dysfunction.  Moderate mitral regurgitation.  Mild tricuspid regurgitation.  Normal central venous pressure (3 mmHg).  The estimated PA systolic pressure is 35 mmHg.  Mild left atrial enlargement.    Echocardiogram 6.17.22:  The left ventricle is normal in size with mild concentric hypertrophy and normal systolic function.  The estimated ejection fraction is 60%.  Normal left ventricular diastolic function.  Normal right ventricular size with normal right ventricular systolic function.  Mild right atrial enlargement.  Moderate mitral regurgitation.  Mild tricuspid regurgitation.  Moderate left atrial enlargement.    Coronary Angiogram December 8, 2021:  Left main: Large vessel that tapers distally. No stenosis.  LAD: Large vessel. 20 to 30% proximal/mid segment stenosis.  Diagonal 1: Small vessel. No stenosis.  Diagonal 2: Tiny size vessel. No stenosis.  Circumflex: Large vessel. No stenosis.  Obtuse marginal 1: Small vessel. 50% ostial stenosis.  Left PDA: Small size vessel. No stenosis.  RCA: Medium sized vessel. 2 sequential lesions, both with 50% stenosis mid vessel.  PLB: Tiny to small vessel. No stenosis.  PDA: Tiny to small vessel. No stenosis.  Angiogram  summary:  Coronaries: Nonobstructive coronary artery disease  LVEDP: Normal end-diastolic pressure.    TTE 10.1.21  Left ventricular ejection fraction is measured approximately 50 to 55%  Structurally normal mitral valve  Mild mitral regurgitation  Structurally normal trileaflet aortic valve  Tricuspid valve is structurally normal  There is mild tricuspid regurgitation with RVSP estimated at 28 mmHg  The pulmonic valve was not well visualized  Normal size left atrium  Normal right atrial size  Normal right ventricular size with preserved RV function                                                                                                                                                                                                                                                                                                                                                                                                                                                                         Patient Active Problem List   Diagnosis    Coronary artery disease involving native coronary artery of native heart without angina pectoris    Primary hypertension    Tobacco use    RICE (dyspnea on exertion)    Prostate CA    Anemia requiring transfusions    Esophagitis    Chronic hemorrhagic anemia    Iron deficiency anemia    Iron deficiency anemia due to chronic blood loss    Mixed hyperlipidemia    Olecranon bursitis of right elbow    BMI 34.0-34.9,adult    Chronic obstructive pulmonary disease    Restrictive lung disease     Past Surgical History:   Procedure Laterality Date    COLONOSCOPY  12/29/2016    COLONOSCOPY Left 10/12/2022    Procedure: COLONOSCOPY;  Surgeon: Meggan Lester MD;  Location: [facility] ENDOSCOPY;  Service: Gastroenterology;  Laterality: Left;     Social History     Socioeconomic History    Marital status: Single   Tobacco Use    Smoking status: Every Day     Current packs/day: 0.50     Types:  Cigarettes     Passive exposure: Never    Smokeless tobacco: Never   Substance and Sexual Activity    Alcohol use: Not Currently    Drug use: Never    Sexual activity: Not Currently        Family History   Problem Relation Name Age of Onset    Hypertension Mother      Kidney failure Mother      Bone cancer Father           Current Outpatient Medications:     amLODIPine (NORVASC) 5 MG tablet, Take 1 tablet (5 mg total) by mouth once daily., Disp: 30 tablet, Rfl: 11    aspirin (ECOTRIN) 81 MG EC tablet, Take 1 tablet (81 mg total) by mouth once daily., Disp: 90 tablet, Rfl: 3    atorvastatin (LIPITOR) 40 MG tablet, Take 1 tablet (40 mg total) by mouth once daily., Disp: 90 tablet, Rfl: 3    ergocalciferol (ERGOCALCIFEROL) 50,000 unit Cap, Take 50,000 Int'l Units by mouth., Disp: , Rfl:     metoprolol succinate (TOPROL-XL) 50 MG 24 hr tablet, Take 1 tablet (50 mg total) by mouth once daily., Disp: 90 tablet, Rfl: 3    mometasone-formoterol 50-5 mcg/actuation HFAA, Inhale 1 puff into the lungs every 8 (eight) hours as needed (wheezing SOB)., Disp: 13 g, Rfl: 3    nicotine (NICODERM CQ) 7 mg/24 hr, Place 1 patch onto the skin once daily., Disp: 30 patch, Rfl: 0    pantoprazole (PROTONIX) 40 MG tablet, Take 1 tablet (40 mg total) by mouth once daily., Disp: 30 tablet, Rfl: 11    tamsulosin (FLOMAX) 0.4 mg Cap, Take 1 capsule (0.4 mg total) by mouth once daily., Disp: 90 capsule, Rfl: 3    alprostadiL (MUSE) 250 MCG pellet, Place 250 mcg into the urethra as needed. (Patient not taking: Reported on 4/17/2024), Disp: , Rfl:     nicotine, polacrilex, (NICORETTE) 2 mg Gum, Take 1 each (2 mg total) by mouth as needed. (Patient not taking: Reported on 10/6/2023), Disp: 190 each, Rfl: 0     ROS:                                                                                                                                                                             Review of Systems   Constitutional: Negative.    Respiratory:   "Positive for shortness of breath.    Cardiovascular:  Positive for palpitations.   Gastrointestinal: Negative.    Musculoskeletal: Negative.    Skin: Negative.    Psychiatric/Behavioral: Negative.          Blood pressure 136/86, pulse 90, temperature 98.4 °F (36.9 °C), temperature source Oral, resp. rate 18, height 5' 8" (1.727 m), weight 100.3 kg (221 lb 3.2 oz).   PE:  Physical Exam  Constitutional:       Appearance: Normal appearance.   HENT:      Head: Normocephalic.   Eyes:      Extraocular Movements: Extraocular movements intact.   Cardiovascular:      Rate and Rhythm: Normal rate and regular rhythm.      Heart sounds: Murmur heard.   Pulmonary:      Effort: Pulmonary effort is normal.   Abdominal:      Palpations: Abdomen is soft.   Musculoskeletal:         General: Normal range of motion.      Cervical back: Neck supple.      Left lower leg: Edema present.   Skin:     General: Skin is warm and dry.   Neurological:      General: No focal deficit present.      Mental Status: He is alert and oriented to person, place, and time.   Psychiatric:         Mood and Affect: Mood normal.        ASSESSMENT/PLAN:  Nonobstructive Coronary Artery Disease  per [facility] 12.8.21  - Hx NSTEMI in Oct. 2021  - Lexiscan stress test- small area of mild inferior ischemia with reversibility (10.4.21)  - EF- 60%, mod. MR per ECHO 7.18.23  - Denies any CP. Reports stable RICE  - Continue Aspirin, Atorvastatin, metoprolol succinate 50 mg, and SL nitro prn  - Counseled on heart healthy diet, increased exercise, and smoking cessation    RICE - Stable   - EF- 60%, mod. MR per ECHO 7.18.23  - EF 60% per Echo 6.17.22    Mitral Regurgitation  - EF-60%, moderate MR, mild TR per ECHO 7.18.23  - Moderate MR per Echo 6.17.22  - Will continue to monitor with serial Echocardiograms at least every 1-2 years    HTN   - BP at goal  - Continue Metoprolol Succinate 50mg daily and amlodipine 5 mg   - Counseled on low sodium diet and exercise    HLD  - LDL -63- " at goal   - Continue Atorvastatin 40mg PO QD   - Counseled on low cholesterol/low fat diet and exercise as tolerated    Tobacco Use  - Report smokes 1/2 pack of cigarettes a day in his ready to quit.  Will  previus prescription for nicotine patches today   - Counseled on the importance of smoking cessation    Prostate Cancer  - Follow up with Oncology as directed    Palpitaitons  - Reports intermittent palpitations in which he feels his heart race. (See HPI)  - Obtain 48 Hour Holter Monitor to assess for any arrhythmias  - Continue metoprolol succinate 50 mg daily for now.  May adjust pending findings of Holter  - Advised patient to avoid/decrease caffeine intake and remain well hydrated  - Sleep Study Referral recently placed per PCP     Carotid Bruit?  - Obtain Carotid US to assess for any significant carotid artery stenosis, given history and risk factors      Carotid US  48 Hour Holter Monitor   Follow up in Cardiology Clinic in 4 months or sooner if needed    Follow up with PCP and Oncology as directed

## 2024-04-25 ENCOUNTER — OFFICE VISIT (OUTPATIENT)
Dept: OTOLARYNGOLOGY | Facility: CLINIC | Age: 59
End: 2024-04-25
Payer: MEDICAID

## 2024-04-25 VITALS
HEART RATE: 93 BPM | HEIGHT: 68 IN | SYSTOLIC BLOOD PRESSURE: 131 MMHG | WEIGHT: 222.63 LBS | BODY MASS INDEX: 33.74 KG/M2 | DIASTOLIC BLOOD PRESSURE: 79 MMHG

## 2024-04-25 DIAGNOSIS — J98.4 RESTRICTIVE LUNG DISEASE: ICD-10-CM

## 2024-04-25 DIAGNOSIS — R49.9 CHANGE IN VOICE: ICD-10-CM

## 2024-04-25 DIAGNOSIS — R49.0 MUSCLE TENSION DYSPHONIA: Primary | ICD-10-CM

## 2024-04-25 PROCEDURE — 31575 DIAGNOSTIC LARYNGOSCOPY: CPT | Mod: PBBFAC | Performed by: STUDENT IN AN ORGANIZED HEALTH CARE EDUCATION/TRAINING PROGRAM

## 2024-04-25 PROCEDURE — 99214 OFFICE O/P EST MOD 30 MIN: CPT | Mod: PBBFAC | Performed by: STUDENT IN AN ORGANIZED HEALTH CARE EDUCATION/TRAINING PROGRAM

## 2024-04-25 RX ORDER — LIDOCAINE HYDROCHLORIDE 40 MG/ML
SOLUTION TOPICAL
Status: DISCONTINUED
Start: 2024-04-25 | End: 2024-04-25 | Stop reason: HOSPADM

## 2024-04-25 NOTE — PROGRESS NOTES
The scope used for the exam was:  Flexible scope ENF-P4  Serial Number:  1)    [id number]    []   2)    [id number]    [x]   3)    [id number]    []   4)    [id number]    []   5)    [id number]    []   6)    [id number]    []       The scope used for the exam was:  Rigid scope   Serial Number:  1)   [id number]    []   2)   [id number]    []   3)   7330    []   4)    3384   []   5)    0824   []   6)    5554   []     7)   7425    []   8)   2240    []   9)   1109    []

## 2024-04-25 NOTE — PROGRESS NOTES
[facility]  Otolaryngology-Head & Neck Surgery    Office Visit    Burton Vasquez  [id number]  1965    CC: dysphonia    HPI: Burton Vasquez is a 58 y.o. male past medical history of prostate cancer and hypertension presents with 1 week dysphonia.  He describes it as intermittent.  He describes his voice during this time as rough.  He rates it as a 5 on a scale of 0-10.  He is never experienced anything like this before.  He denies recent URI.  He denies any dysphagia , shortness of breath, weight loss, fever, otalgia, or any lumps or bumps in his neck.  He does have a significant history of tobacco abuse.  He smoked a pack a day for about 30 years.  He does not notice any difference in the time of day of his dysphonia.  He says it is very intermittent in does not usually last for very long.    Review of patient's allergies indicates:  No Known Allergies    Past Medical History:   Diagnosis Date    Hypertension     Prostate cancer     Stroke        Past Surgical History:   Procedure Laterality Date    COLONOSCOPY  12/29/2016    COLONOSCOPY Left 10/12/2022    Procedure: COLONOSCOPY;  Surgeon: Meggan Lester MD;  Location: [facility] ENDOSCOPY;  Service: Gastroenterology;  Laterality: Left;       Social History     Socioeconomic History    Marital status: Single   Tobacco Use    Smoking status: Every Day     Current packs/day: 0.50     Types: Cigarettes     Passive exposure: Never    Smokeless tobacco: Never    Tobacco comments:     Quit smoking last week    Substance and Sexual Activity    Alcohol use: Not Currently    Drug use: Never    Sexual activity: Not Currently       Family History   Problem Relation Name Age of Onset    Hypertension Mother      Kidney failure Mother      Bone cancer Father         Outpatient Encounter Medications as of 4/25/2024   Medication Sig Dispense Refill    amLODIPine (NORVASC) 5 MG tablet Take 1 tablet (5 mg total) by mouth once daily. 30 tablet 11     metoprolol succinate (TOPROL-XL) 50 MG 24 hr tablet Take 1 tablet (50 mg total) by mouth once daily. 90 tablet 3    nicotine (NICODERM CQ) 7 mg/24 hr Place 1 patch onto the skin once daily. 30 patch 0    tamsulosin (FLOMAX) 0.4 mg Cap Take 1 capsule (0.4 mg total) by mouth once daily. 90 capsule 3    alprostadiL (MUSE) 250 MCG pellet Place 250 mcg into the urethra as needed. (Patient not taking: Reported on 4/17/2024)      aspirin (ECOTRIN) 81 MG EC tablet Take 1 tablet (81 mg total) by mouth once daily. 90 tablet 3    atorvastatin (LIPITOR) 40 MG tablet Take 1 tablet (40 mg total) by mouth once daily. 90 tablet 3    ergocalciferol (ERGOCALCIFEROL) 50,000 unit Cap Take 50,000 Int'l Units by mouth.      mometasone-formoterol 50-5 mcg/actuation HFAA Inhale 1 puff into the lungs every 8 (eight) hours as needed (wheezing SOB). 13 g 3    nicotine, polacrilex, (NICORETTE) 2 mg Gum Take 1 each (2 mg total) by mouth as needed. (Patient not taking: Reported on 10/6/2023) 190 each 0    pantoprazole (PROTONIX) 40 MG tablet Take 1 tablet (40 mg total) by mouth once daily. 30 tablet 11     Facility-Administered Encounter Medications as of 4/25/2024   Medication Dose Route Frequency Provider Last Rate Last Admin    LIDOcaine HCL 4% (XYLOCAINE) 4 % (40 mg/mL) external solution             phenylephrine HCL 1% (KATELYN-SYNEPHRINE) 1 % nasal spray                PHYSICAL EXAM:  Vitals:    04/25/24 0902   BP: 131/79   Pulse: 93       General Appearance: well nourished, well-developed, alert, oriented, in no acute distress, no dysphonia  Head/Face: Normocephalic, atraumatic  Eyes: EOMI, normal conjunctiva  Ears: Hears well at normal conversation volume  Otomicroscopy:   AD: external normal, ear canal normal, TM intact without effusion or retraction  AS: external normal, ear canal normal, TM intact without effusion or retraction  Nose: External nose normal, septum midline, no inferior turbinate hypertrophy, no epistaxis  Oral Cavity &  Oropharynx: Lips normal. Tongue without masses or lesions. Dentition fair. Oropharynx unremarkable. No masses, lesions, or leukoplakia. Floor of mouth and base of tongue are soft.   Neck: Soft, non-tender, no palpable lymph nodes. Thyroid without nodules or goiter.   Neuro: CN II - XII intact  Psychiatric: oriented to time, place and person, no depression, anxiety or agitation      PROCEDURE: Flexible fiberoptic laryngoscopy    Surgeon: Reuben Calderon   Anesthesia: lidocaine  Complications: None  Date: 04/25/2024  Indication: dysphonia    Procedure in Detail:    Informed consent was obtained from the patient after explanation of procedure, indications, risks and benefits. Flexible laryngoscopy was performed on Burton Vasquez through the left nasal passage(s). The nasal cavity, nasopharynx, oropharynx, hypopharynx and larynx were adequately visualized. The true vocal cords and arytenoids were examined during phonation and repose.    Operative Findings:    Nasal Cavity:  nasal airway patent without lesions or ulcerations  Nasopharynx: No adenoid hypertrophy, no masses or ulcerations noted in the fossae of Rosenmüller, patent appearing gonzalez tubarii  Tongue Base: No fullness or lingual tonsillar hypertrophy. No masses.  Pharyngeal Walls: No lesions or ulcerations noted  Epiglottis / Aryepiglottic Folds: Crisp epiglottis without lesions, normal-appearing aryepiglottic folds without lesions.   Arytenoids: Full symmetric range of motion bilaterally, no lesions  Piriform Sinus: No lesions or masses noted  Vocal Cords: Symmetric movement bilaterally with good glottic closure, patent airway during inspiration and expiration, no lesions or masses      The patient tolerated the procedure well without any complications.    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         ASSESSMENT:  Burton Vasquez is a 58 y.o. male with past medical history of high blood pressure and prostate  cancer presents with signs and symptoms of muscle tension dysphonia    PLAN:  --ambulatory referral to speech therapy    RTC PRN    Reuben Calderon MD  [facility]  9:32 AM 04/25/2024

## 2024-05-01 NOTE — PROGRESS NOTES
I have reviewed and agree with the resident's findings, including all diagnostic interpretations and plans as written.     Gael Monique M.D.

## 2024-05-07 DIAGNOSIS — G47.33 OSA (OBSTRUCTIVE SLEEP APNEA): Primary | ICD-10-CM

## 2024-06-02 ENCOUNTER — PROCEDURE VISIT (OUTPATIENT)
Dept: SLEEP MEDICINE | Facility: HOSPITAL | Age: 59
End: 2024-06-02
Attending: FAMILY MEDICINE
Payer: MEDICAID

## 2024-06-02 DIAGNOSIS — G47.33 OSA (OBSTRUCTIVE SLEEP APNEA): ICD-10-CM

## 2024-06-02 PROCEDURE — 95810 POLYSOM 6/> YRS 4/> PARAM: CPT

## 2024-06-27 ENCOUNTER — HOSPITAL ENCOUNTER (OUTPATIENT)
Dept: CARDIOLOGY | Facility: HOSPITAL | Age: 59
Discharge: HOME | End: 2024-06-27
Attending: NURSE PRACTITIONER
Payer: MEDICAID

## 2024-06-27 ENCOUNTER — HOSPITAL ENCOUNTER (OUTPATIENT)
Dept: RADIOLOGY | Facility: HOSPITAL | Age: 59
Discharge: HOME | End: 2024-06-27
Attending: NURSE PRACTITIONER
Payer: MEDICAID

## 2024-06-27 ENCOUNTER — HOSPITAL ENCOUNTER (OUTPATIENT)
Dept: RADIOLOGY | Facility: HOSPITAL | Age: 59
Discharge: HOME | End: 2024-06-27
Attending: FAMILY MEDICINE
Payer: MEDICAID

## 2024-06-27 DIAGNOSIS — Z87.891 PERSONAL HISTORY OF NICOTINE DEPENDENCE: ICD-10-CM

## 2024-06-27 DIAGNOSIS — R00.2 PALPITATIONS: ICD-10-CM

## 2024-06-27 DIAGNOSIS — R09.89 CAROTID BRUIT, UNSPECIFIED LATERALITY: ICD-10-CM

## 2024-06-27 LAB
LEFT CCA DIST DIAS: 20 CM/S
LEFT CCA DIST SYS: 74 CM/S
LEFT CCA PROX DIAS: 24 CM/S
LEFT CCA PROX SYS: 99 CM/S
LEFT ECA DIAS: 26 CM/S
LEFT ECA SYS: 141 CM/S
LEFT ICA DIST DIAS: 14 CM/S
LEFT ICA DIST SYS: 39 CM/S
LEFT ICA MID DIAS: 17 CM/S
LEFT ICA MID SYS: 44 CM/S
LEFT ICA PROX DIAS: 15 CM/S
LEFT ICA PROX SYS: 39 CM/S
LEFT VERTEBRAL DIAS: 15 CM/S
LEFT VERTEBRAL SYS: 34 CM/S
OHS CV CAROTID RIGHT ICA EDV HIGHEST: 14
OHS CV CAROTID ULTRASOUND LEFT ICA/CCA RATIO: 0.59
OHS CV CAROTID ULTRASOUND RIGHT ICA/CCA RATIO: 0.85
OHS CV PV CAROTID LEFT HIGHEST CCA: 99
OHS CV PV CAROTID LEFT HIGHEST ICA: 44
OHS CV PV CAROTID RIGHT HIGHEST CCA: 75
OHS CV PV CAROTID RIGHT HIGHEST ICA: 52
OHS CV US CAROTID LEFT HIGHEST EDV: 17
RIGHT CCA DIST DIAS: 9 CM/S
RIGHT CCA DIST SYS: 61 CM/S
RIGHT CCA PROX DIAS: 17 CM/S
RIGHT CCA PROX SYS: 75 CM/S
RIGHT ECA DIAS: 9 CM/S
RIGHT ECA SYS: 48 CM/S
RIGHT ICA DIST DIAS: 14 CM/S
RIGHT ICA DIST SYS: 40 CM/S
RIGHT ICA MID DIAS: 14 CM/S
RIGHT ICA MID SYS: 39 CM/S
RIGHT ICA PROX DIAS: 10 CM/S
RIGHT ICA PROX SYS: 52 CM/S
RIGHT VERTEBRAL DIAS: 10 CM/S
RIGHT VERTEBRAL SYS: 38 CM/S

## 2024-06-27 PROCEDURE — 71271 CT THORAX LUNG CANCER SCR C-: CPT | Mod: TC

## 2024-06-27 PROCEDURE — 93880 EXTRACRANIAL BILAT STUDY: CPT

## 2024-06-27 PROCEDURE — 93226 XTRNL ECG REC<48 HR SCAN A/R: CPT

## 2024-06-28 DIAGNOSIS — I25.10 CORONARY ARTERY DISEASE INVOLVING NATIVE CORONARY ARTERY OF NATIVE HEART WITHOUT ANGINA PECTORIS: ICD-10-CM

## 2024-06-28 DIAGNOSIS — I10 PRIMARY HYPERTENSION: ICD-10-CM

## 2024-06-28 RX ORDER — AMLODIPINE BESYLATE 5 MG/1
5 TABLET ORAL DAILY
Qty: 90 TABLET | Refills: 3 | Status: SHIPPED | OUTPATIENT
Start: 2024-06-28 | End: 2025-06-28

## 2024-06-28 RX ORDER — ASPIRIN 81 MG/1
81 TABLET ORAL DAILY
Qty: 90 TABLET | Refills: 3 | Status: SHIPPED | OUTPATIENT
Start: 2024-06-28 | End: 2025-06-28

## 2024-06-28 RX ORDER — METOPROLOL SUCCINATE 50 MG/1
50 TABLET, EXTENDED RELEASE ORAL DAILY
Qty: 90 TABLET | Refills: 3 | Status: SHIPPED | OUTPATIENT
Start: 2024-06-28 | End: 2025-06-28

## 2024-07-01 ENCOUNTER — TELEPHONE (OUTPATIENT)
Dept: CARDIOLOGY | Facility: CLINIC | Age: 59
End: 2024-07-01
Payer: MEDICAID

## 2024-07-09 ENCOUNTER — OFFICE VISIT (OUTPATIENT)
Dept: INTERNAL MEDICINE | Facility: CLINIC | Age: 59
End: 2024-07-09
Payer: MEDICAID

## 2024-07-09 VITALS
DIASTOLIC BLOOD PRESSURE: 78 MMHG | RESPIRATION RATE: 18 BRPM | HEIGHT: 68 IN | HEART RATE: 80 BPM | TEMPERATURE: 98 F | SYSTOLIC BLOOD PRESSURE: 125 MMHG | BODY MASS INDEX: 34.25 KG/M2 | WEIGHT: 226 LBS | OXYGEN SATURATION: 99 %

## 2024-07-09 DIAGNOSIS — I10 PRIMARY HYPERTENSION: ICD-10-CM

## 2024-07-09 DIAGNOSIS — G47.33 OSA ON CPAP: ICD-10-CM

## 2024-07-09 DIAGNOSIS — Z23 NEED FOR VACCINATION: Primary | ICD-10-CM

## 2024-07-09 PROCEDURE — 90471 IMMUNIZATION ADMIN: CPT | Mod: PBBFAC

## 2024-07-09 PROCEDURE — 99214 OFFICE O/P EST MOD 30 MIN: CPT | Mod: PBBFAC,25 | Performed by: STUDENT IN AN ORGANIZED HEALTH CARE EDUCATION/TRAINING PROGRAM

## 2024-07-09 PROCEDURE — 90677 PCV20 VACCINE IM: CPT | Mod: PBBFAC

## 2024-07-09 RX ORDER — ALBUTEROL SULFATE 90 UG/1
2 AEROSOL, METERED RESPIRATORY (INHALATION) EVERY 4 HOURS PRN
COMMUNITY
Start: 2023-11-13

## 2024-07-09 RX ADMIN — PNEUMOCOCCAL 20-VALENT CONJUGATE VACCINE 0.5 ML
2.2; 2.2; 2.2; 2.2; 2.2; 2.2; 2.2; 2.2; 2.2; 2.2; 2.2; 2.2; 2.2; 2.2; 2.2; 2.2; 4.4; 2.2; 2.2; 2.2 INJECTION, SUSPENSION INTRAMUSCULAR at 01:07

## 2024-07-09 NOTE — PROGRESS NOTES
PROGRESS NOTE  Ambulatory Clinic  Burton Vasquez [id number] 7/9/2024  Chief Complaint  Follow-up (Patient states no acute complaints today.)     RALEIGH Vasquez is a 58 y.o. male who has a past medical history of Hypertension, Prostate cancer, and Stroke.  He presents to clinic today for follow-up of chronic medical conditions. Denies any complaints at this time.    ROS  Constitutional: no fever, fatigue, weakness  Eye: no vision loss, eye redness, drainage, or pain  ENMT: no sore throat, ear pain, sinus pain/congestion, nasal congestion/drainage  Respiratory: no cough, no wheezing, dyspnea on exertion  Cardiovascular: no chest pain, no palpitations, no edema  Gastrointestinal: no nausea, vomiting, or diarrhea. No abdominal pain  Genitourinary: no dysuria, no urinary frequency or urgency, no hematuria  Hema/Lymph: no abnormal bruising or bleeding  Endocrine: no heat or cold intolerance, no excessive thirst or excessive urination  Musculoskeletal: no muscle or joint pain, no joint swelling  Integumentary: no skin rash or abnormal lesion  Neurologic: no headache, no dizziness, no weakness or numbness     PE  Vitals:    07/09/24 1245   BP: 125/78   Pulse: 80   Resp: 18   Temp: 98.3 °F (36.8 °C)      GA: Alert, comfortable, no acute distress.   HEENT: Adequate dentition. No visible oropharyngeal abnormalities. No scleral icterus or JVD. No visible thyromegally.   Pulmonary: Chest wall symmetric. No accessory muscle use. No abnormalities on percussion. No tenderness to palpation. Clear to auscultation A/P/L bilaterally. No wheezing, crackles, or rhonchi.  Cardiac: RRR S1 & S2 w/o rubs/murmurs/gallops. No lower extremity edema.   Abdominal: Bowel sounds present x 4. No appreciable hepatosplenomegaly.  Skin: No jaundice, rashes, or visible lesions.  Lymphatic: No cervical or inguinal lymphadenopathy.  Musculoskeletal: Moves all extremities 5/5.  Extremities: No clubbing or cyanosis.  Neuro: CN grossly intact, normal gait,  normal coordination, no sensory or motor deficits    Assessment/Plan  Gastritis  Esophagitis  Anemia secondary to above  Blood in stool  -symptoms resolved, continues to take PPI  -hemoglobin stable and continues to improve.  -advised to f/u with GI, next appt 8/2024    Dyspnea on exertion  Coronary artery disease  Hypertension  Elevated ASCVD risk score  Nicotine use disorder  Obesity  Prediabetes  - Counseled on diet and exercise  -currently not interested in weight loss medications  -started taking amlodipine  -patient continues to smoke and he reports that he is ready to quit smoking will order smoking cessation program and start on nicotine patches as well as nicotine gum  -last [facility] 2021  -echo was ordered by Cardiology but patient has not scheduled this yet  -BNP was normal, chest x-ray was unremarkable, chest CT low dose was also unremarkable in 6/2024  -patient reports improvement in shortness a breath  - PFT demonstrates moderate restrictive pathology no underlying COPD, flow volume loops appeared to have signs extrinsic variable obstruction, he was referred to ENT to rule out any vocal cord paralysis or pathology  - blood pressure stable today at 129/77 after taking blood pressure medications today  -previously sent 7 mg nicotine patches to help with nicotine cessation    History of prostate cancer s/p prostatectomy with subsequent low testosterone  S/p XRT  -Continue following with Oncology  -missed first lupron injection  -f/u with urology  -s/p xrt  -dexa 7/11/22 WNL    PHTN  JOSE on CPAP  ESS16  Sleep study in 6/2024: auto-CPAP 5-20 cm H2O  Uses CPAP nightly  No signs of DVT    Preventative  DM (age>45 or HTN): repeat at next visit  Lipid (age>35): ldl 63 (7mo ago)  Declined STD screening  Lung Ca (30PY smoker age 55-79 or quit in last 15y): Lung-RADS 1 in 6/2024  Colon Ca: (age 50-75-annual FOBT, 5y flex + FOBTq3 or 10y c-scope):  Last colonoscopy 2022- for any malignancy repeat in 2032  Immunizations  (generally - flu, shingrix, t-dap, PCV, Covid):  Declines all vaccines at this time (pneumonia today, tetanus next time)      RTC in 6 months.    Future Appointments   Date Time Provider Department Center   8/16/2024  8:00 AM Magnolia Alexander FNP [facility] GASTRO [place]   8/16/2024  9:15 AM Luz Acosta FNP [facility] CARD [place]    9/25/2024  9:15 AM Birdie Ramirez DO [facility] UROLO [place]     Carolina Zimmer MD  [facility] Internal Medicine, PRG-3  7/9/2024

## 2024-08-16 ENCOUNTER — LAB VISIT (OUTPATIENT)
Dept: LAB | Facility: HOSPITAL | Age: 59
End: 2024-08-16
Attending: NURSE PRACTITIONER
Payer: MEDICAID

## 2024-08-16 ENCOUNTER — OFFICE VISIT (OUTPATIENT)
Dept: GASTROENTEROLOGY | Facility: CLINIC | Age: 59
End: 2024-08-16
Payer: MEDICAID

## 2024-08-16 ENCOUNTER — TELEPHONE (OUTPATIENT)
Dept: GASTROENTEROLOGY | Facility: CLINIC | Age: 59
End: 2024-08-16

## 2024-08-16 ENCOUNTER — OFFICE VISIT (OUTPATIENT)
Dept: CARDIOLOGY | Facility: CLINIC | Age: 59
End: 2024-08-16
Payer: MEDICAID

## 2024-08-16 VITALS
OXYGEN SATURATION: 99 % | RESPIRATION RATE: 18 BRPM | HEART RATE: 82 BPM | WEIGHT: 226.19 LBS | HEIGHT: 68 IN | TEMPERATURE: 98 F | DIASTOLIC BLOOD PRESSURE: 88 MMHG | SYSTOLIC BLOOD PRESSURE: 136 MMHG | BODY MASS INDEX: 34.28 KG/M2

## 2024-08-16 VITALS
HEART RATE: 87 BPM | SYSTOLIC BLOOD PRESSURE: 135 MMHG | WEIGHT: 228 LBS | BODY MASS INDEX: 34.56 KG/M2 | TEMPERATURE: 98 F | DIASTOLIC BLOOD PRESSURE: 78 MMHG | HEIGHT: 68 IN

## 2024-08-16 DIAGNOSIS — I25.10 CORONARY ARTERY DISEASE INVOLVING NATIVE CORONARY ARTERY OF NATIVE HEART WITHOUT ANGINA PECTORIS: Primary | ICD-10-CM

## 2024-08-16 DIAGNOSIS — G47.33 OSA ON CPAP: ICD-10-CM

## 2024-08-16 DIAGNOSIS — K44.9 HIATAL HERNIA: ICD-10-CM

## 2024-08-16 DIAGNOSIS — K59.04 CHRONIC IDIOPATHIC CONSTIPATION: ICD-10-CM

## 2024-08-16 DIAGNOSIS — D50.0 IRON DEFICIENCY ANEMIA DUE TO CHRONIC BLOOD LOSS: ICD-10-CM

## 2024-08-16 DIAGNOSIS — I38 VHD (VALVULAR HEART DISEASE): ICD-10-CM

## 2024-08-16 DIAGNOSIS — K64.8 INTERNAL HEMORRHOIDS WITHOUT COMPLICATION: ICD-10-CM

## 2024-08-16 DIAGNOSIS — Z72.0 TOBACCO USER: ICD-10-CM

## 2024-08-16 DIAGNOSIS — C61 PROSTATE CANCER: ICD-10-CM

## 2024-08-16 DIAGNOSIS — E78.2 MIXED HYPERLIPIDEMIA: ICD-10-CM

## 2024-08-16 DIAGNOSIS — D50.0 IRON DEFICIENCY ANEMIA DUE TO CHRONIC BLOOD LOSS: Primary | ICD-10-CM

## 2024-08-16 DIAGNOSIS — I10 PRIMARY HYPERTENSION: ICD-10-CM

## 2024-08-16 DIAGNOSIS — J42 CHRONIC BRONCHITIS, UNSPECIFIED CHRONIC BRONCHITIS TYPE: ICD-10-CM

## 2024-08-16 LAB
BASOPHILS # BLD AUTO: 0.03 X10(3)/MCL
BASOPHILS NFR BLD AUTO: 0.5 %
EOSINOPHIL # BLD AUTO: 0.22 X10(3)/MCL (ref 0–0.9)
EOSINOPHIL NFR BLD AUTO: 3.7 %
ERYTHROCYTE [DISTWIDTH] IN BLOOD BY AUTOMATED COUNT: 15.9 % (ref 11.5–17)
EST. AVERAGE GLUCOSE BLD GHB EST-MCNC: 203 MG/DL
FERRITIN SERPL-MCNC: 17.2 NG/ML (ref 21.81–274.66)
HBA1C MFR BLD: 8.7 %
HCT VFR BLD AUTO: 37.3 % (ref 42–52)
HGB BLD-MCNC: 12.3 G/DL (ref 14–18)
IMM GRANULOCYTES # BLD AUTO: 0.02 X10(3)/MCL (ref 0–0.04)
IMM GRANULOCYTES NFR BLD AUTO: 0.3 %
IRON SATN MFR SERPL: 15 % (ref 20–50)
IRON SERPL-MCNC: 59 UG/DL (ref 65–175)
LYMPHOCYTES # BLD AUTO: 2.27 X10(3)/MCL (ref 0.6–4.6)
LYMPHOCYTES NFR BLD AUTO: 38.3 %
MCH RBC QN AUTO: 24.4 PG (ref 27–31)
MCHC RBC AUTO-ENTMCNC: 33 G/DL (ref 33–36)
MCV RBC AUTO: 73.9 FL (ref 80–94)
MONOCYTES # BLD AUTO: 0.34 X10(3)/MCL (ref 0.1–1.3)
MONOCYTES NFR BLD AUTO: 5.7 %
NEUTROPHILS # BLD AUTO: 3.04 X10(3)/MCL (ref 2.1–9.2)
NEUTROPHILS NFR BLD AUTO: 51.5 %
NRBC BLD AUTO-RTO: 0 %
PLATELET # BLD AUTO: 165 X10(3)/MCL (ref 130–400)
PMV BLD AUTO: 10.5 FL (ref 7.4–10.4)
PSA SERPL-MCNC: 0.11 NG/ML
RBC # BLD AUTO: 5.05 X10(6)/MCL (ref 4.7–6.1)
TESTOST SERPL-MCNC: 23.14 NG/DL (ref 220.91–715.81)
TIBC SERPL-MCNC: 332 UG/DL (ref 69–240)
TIBC SERPL-MCNC: 391 UG/DL (ref 250–450)
TRANSFERRIN SERPL-MCNC: 372 MG/DL (ref 174–364)
WBC # BLD AUTO: 5.92 X10(3)/MCL (ref 4.5–11.5)

## 2024-08-16 PROCEDURE — 36415 COLL VENOUS BLD VENIPUNCTURE: CPT

## 2024-08-16 PROCEDURE — 85025 COMPLETE CBC W/AUTO DIFF WBC: CPT

## 2024-08-16 PROCEDURE — 82728 ASSAY OF FERRITIN: CPT

## 2024-08-16 PROCEDURE — 83550 IRON BINDING TEST: CPT

## 2024-08-16 PROCEDURE — 84403 ASSAY OF TOTAL TESTOSTERONE: CPT

## 2024-08-16 PROCEDURE — 83540 ASSAY OF IRON: CPT

## 2024-08-16 PROCEDURE — 99215 OFFICE O/P EST HI 40 MIN: CPT | Mod: PBBFAC | Performed by: NURSE PRACTITIONER

## 2024-08-16 PROCEDURE — 84153 ASSAY OF PSA TOTAL: CPT

## 2024-08-16 PROCEDURE — 99215 OFFICE O/P EST HI 40 MIN: CPT | Mod: PBBFAC,27 | Performed by: NURSE PRACTITIONER

## 2024-08-16 PROCEDURE — 83036 HEMOGLOBIN GLYCOSYLATED A1C: CPT

## 2024-08-16 RX ORDER — ATORVASTATIN CALCIUM 40 MG/1
40 TABLET, FILM COATED ORAL DAILY
Qty: 90 TABLET | Refills: 3 | Status: SHIPPED | OUTPATIENT
Start: 2024-08-16 | End: 2025-08-16

## 2024-08-16 RX ORDER — PANTOPRAZOLE SODIUM 40 MG/1
40 TABLET, DELAYED RELEASE ORAL DAILY
Qty: 30 TABLET | Refills: 11 | Status: SHIPPED | OUTPATIENT
Start: 2024-08-16 | End: 2025-08-16

## 2024-08-16 RX ORDER — POLYETHYLENE GLYCOL 3350 17 G/17G
17 POWDER, FOR SOLUTION ORAL DAILY
Qty: 510 G | Refills: 5 | Status: SHIPPED | OUTPATIENT
Start: 2024-08-16

## 2024-08-16 RX ORDER — FERROUS SULFATE 325(65) MG
325 TABLET ORAL
Qty: 30 TABLET | Refills: 5 | Status: SHIPPED | OUTPATIENT
Start: 2024-08-16

## 2024-08-16 NOTE — PATIENT INSTRUCTIONS
ECHO. Will notify of results   Follow up in Cardiology Clinic in 6 months with CMP/FLP or sooner pending results   Follow up with PCP and Oncology as directed

## 2024-08-16 NOTE — TELEPHONE ENCOUNTER
----- Message from BALDO Perez sent at 8/16/2024  1:01 PM CDT -----  Please let patient know that hemoglobin and hematocrit have improved from 4 months ago but that iron studies remain low.  Please see if he would be able to take oral iron supplementation more consistently, at least once daily.  EGD ordered at time of office visit and colonoscopy completed 2 years ago.  If he is unable to tolerate oral iron supplementation once daily, we can offer iron infusions.  Please let me know what he decides and I can order as requested.  Thanks

## 2024-08-16 NOTE — TELEPHONE ENCOUNTER
Contacted patient and informed him of lab results.   Patient says that he was taking an oral iron supplement but he has not taken it in a while.   He says that he was able to tolerate iron supplement in the past. He'd like oral iron prescription to go to [facility] pharmacy in [place].

## 2024-08-16 NOTE — ASSESSMENT & PLAN NOTE
Hospitalized at [facility] October 9, 2022 to November 7, 2022.    He was admitted to hospital medicine and received 2 units PRBCs secondary to anemia with improvement of hemoglobin and hematocrit; he was iron deficient at that time.    He underwent EGD October 12, 2022 with findings of 3 cm hiatal hernia, LA grade C reflux esophagitis, esophageal ulcer with no bleeding and no stigmata of recent bleeding, gastritis, normal examined duodenum.  Pathology revealed mild active chronic gastritis negative for Helicobacter pylori organisms and no dysplasia identified, acute esophagitis with ulceration and no viral cytopathic effect or dysplasia identified, PAS F stain negative for fungal elements.    He underwent colonoscopy October 12, 2022 with findings of examined portion of ileum normal, internal hemorrhoids, no specimens collected with a recommended recall of 10 years.  GERD lifestyle modifications  Reflux precautions  Limit NSAID use  EGD; will consider need for repeat colonoscopy pending review of laboratory results  Continue oral iron supplementation as directed and pantoprazole 40 mg daily  Recommend tobacco cessation  CBC, iron profile, ferritin   Call with updates  ER precautions provided  Follow-up clinic visit with NP in 6 months (after date of scheduled procedure)

## 2024-08-16 NOTE — ASSESSMENT & PLAN NOTE
See above  Recommend soluble fiber supplementation  Avoid straining or sitting on the toilet for long periods of time  Start MiraLax 17 g as directed  Call with updates

## 2024-08-16 NOTE — ASSESSMENT & PLAN NOTE
See above  He underwent colonoscopy October 12, 2022 with findings of examined portion of ileum normal, internal hemorrhoids, no specimens collected with a recommended recall of 10 years.  Recommend soluble fiber supplementation  Avoid straining or sitting on the toilet for long periods of time  Start MiraLax 17 g as directed   Hemorrhoidal banding discussed  Call with updates

## 2024-08-16 NOTE — PROGRESS NOTES
Please let patient know that hemoglobin and hematocrit have improved from 4 months ago but that iron studies remain low.  Please see if he would be able to take oral iron supplementation more consistently, at least once daily.  EGD ordered at time of office visit and colonoscopy completed 2 years ago.  If he is unable to tolerate oral iron supplementation once daily, we can offer iron infusions.  Please let me know what he decides and I can order as requested.  Thanks

## 2024-08-16 NOTE — ASSESSMENT & PLAN NOTE
Hospitalized at [facility] October 9, 2022 to November 7, 2022.    He was admitted to hospital medicine and received 2 units PRBCs secondary to anemia with improvement of hemoglobin and hematocrit; he was iron deficient at that time.    He underwent EGD October 12, 2022 with findings of 3 cm hiatal hernia, LA grade C reflux esophagitis, esophageal ulcer with no bleeding and no stigmata of recent bleeding, gastritis, normal examined duodenum.  Pathology revealed mild active chronic gastritis negative for Helicobacter pylori organisms and no dysplasia identified, acute esophagitis with ulceration and no viral cytopathic effect or dysplasia identified, PAS F stain negative for fungal elements.    He underwent colonoscopy October 12, 2022 with findings of examined portion of ileum normal, internal hemorrhoids, no specimens collected with a recommended recall of 10 years.  GERD lifestyle modifications  Reflux precautions  Limit NSAID use  EGD  Continue oral iron supplementation as directed and pantoprazole 40 mg daily  Recommend tobacco cessation  CBC, iron profile, ferritin   Call with updates  ER precautions provided  Follow-up clinic visit with NP in 6 months (after date of scheduled procedure)

## 2024-08-16 NOTE — PROGRESS NOTES
CHIEF COMPLAINT:   Chief Complaint   Patient presents with    Follow-up     Denies any cardiac problems                     Review of patient's allergies indicates:  No Known Allergies                                       HPI:  Burton Vasquez 58 y.o. male with a past medical history of Hx NSTEMI/Nonobstructive CAD per [facility] Dec. 2021, Hx CVA, HTN, HLD, gastritis, esophagitis, prostate cancer status post radiation, chemotherapy, and Tobacco Abuse who presents to cardiology clinic for routine follow up and results of testing. Carotid US obtained on 6.27.24 demonstrated no hemodynamically significant stenosis to bilateral internal carotid arteries. 48 Hour Holter Monitor obtained in June 2024 due to palpitations was unremarkable sinus rhythm with no significant arrhythmias noted.  Latest Echocardiogram obtained on 7.18.23 revealed a preserved EF of 60%, Grade 1 left ventricular Diastolic Dysfunction, moderate MR, and mild TR, which is stable  to previous Echo in June 2022.     Patient presents to clinic today reporting that he feels well overall.  He continues to endorse stable shortness of breath that occurs with moderate exertion. However, the patient is able to perform his basic ADLs without issue but experiences some exertional dyspnea with his routine heavy housework.  He denies other cardiovascular complaints of chest pain, palpitations, orthopnea, PND, peripheral edema, claudication, lightheadedness, dizziness, near-syncope or syncope.  Activity wise, the patient states that he does lift weights a few times a week.  He continues to smoke approximately half a pack of cigarettes a day and states that he would like to quit but is not completely ready at this time.  He endorses compliance with current medications and is currently tolerating without issue.     Background Information:  Patient was noted to have a hospital admission from October 2021 with NSTEMI, troponin peak 0.12. He completed a Lexiscan stress test  on 10.4.21 that revealed a small area of mild inferior ischemia.  Patient underwent a subsequent Coronary Angiogram procedure on 12.8.21 which revealed nonobstructive coronary artery disease. He was also noted to have positive blood cultures for Streptococcus bacteremia and underwent a subsequent TAQUERIA on 10.8.21 in which a 12 x 7 mm echodensity was noted on MV (consistent with being a vegetation).  The patient completed a course of antibiotics and has had repeat negative cultures.      CARDIAC TESTING:  Carotid US 6.27.24  The right internal carotid artery demonstrated no hemodynamically significant stenosis.  There is minimal amount of plaque in the right carotid bulb.  The right vertebral artery was patent with antegrade flow.     The left internal carotid artery demonstrated no hemodynamically significant stenosis.  There is a minimal amount of plaque in the left carotid bulb.  The left vertebral artery was patent with antegrade flow.    48 Hour Holter 6.27.24  Underlying rhythm:   Sinus  Arrythmia:  No significant arrhythmia noted   Pauses:  No significant pause noted  Symptoms:  No diary returned.  No symptoms reported.   Events:  Patient did not indicate any symptomatic episode during this study           ECHO 7.18.23  The left ventricle is normal in size with moderate concentric hypertrophy and normal systolic function.  Normal right ventricular size with normal right ventricular systolic function.  The estimated ejection fraction is 60%.  Grade I left ventricular diastolic dysfunction.  Moderate mitral regurgitation.  Mild tricuspid regurgitation.  Normal central venous pressure (3 mmHg).  The estimated PA systolic pressure is 35 mmHg.  Mild left atrial enlargement.    Echocardiogram 6.17.22:  The left ventricle is normal in size with mild concentric hypertrophy and normal systolic function.  The estimated ejection fraction is 60%.  Normal left ventricular diastolic function.  Normal right ventricular size  with normal right ventricular systolic function.  Mild right atrial enlargement.  Moderate mitral regurgitation.  Mild tricuspid regurgitation.  Moderate left atrial enlargement.    Coronary Angiogram December 8, 2021:  Left main: Large vessel that tapers distally. No stenosis.  LAD: Large vessel. 20 to 30% proximal/mid segment stenosis.  Diagonal 1: Small vessel. No stenosis.  Diagonal 2: Tiny size vessel. No stenosis.  Circumflex: Large vessel. No stenosis.  Obtuse marginal 1: Small vessel. 50% ostial stenosis.  Left PDA: Small size vessel. No stenosis.  RCA: Medium sized vessel. 2 sequential lesions, both with 50% stenosis mid vessel.  PLB: Tiny to small vessel. No stenosis.  PDA: Tiny to small vessel. No stenosis.  Angiogram summary:  Coronaries: Nonobstructive coronary artery disease  LVEDP: Normal end-diastolic pressure.    TTE 10.1.21  Left ventricular ejection fraction is measured approximately 50 to 55%  Structurally normal mitral valve  Mild mitral regurgitation  Structurally normal trileaflet aortic valve  Tricuspid valve is structurally normal  There is mild tricuspid regurgitation with RVSP estimated at 28 mmHg  The pulmonic valve was not well visualized  Normal size left atrium  Normal right atrial size  Normal right ventricular size with preserved RV function                                                                                                                                                                                                                                                                                                                                                                                                                                                                         Patient Active Problem List   Diagnosis    Coronary artery disease involving native coronary artery of native heart without angina pectoris    Primary hypertension    Tobacco user    RICE (dyspnea on  exertion)    Prostate CA    Anemia requiring transfusions    Esophagitis    Chronic hemorrhagic anemia    Iron deficiency anemia    Iron deficiency anemia due to chronic blood loss    Mixed hyperlipidemia    Olecranon bursitis of right elbow    BMI 34.0-34.9,adult    Chronic obstructive pulmonary disease    Restrictive lung disease    Muscle tension dysphonia    JOSE on CPAP    Hiatal hernia    Internal hemorrhoids without complication    Chronic idiopathic constipation     Past Surgical History:   Procedure Laterality Date    COLONOSCOPY  12/29/2016    COLONOSCOPY Left 10/12/2022    Procedure: COLONOSCOPY;  Surgeon: Meggan Lester MD;  Location: [facility] ENDOSCOPY;  Service: Gastroenterology;  Laterality: Left;     Social History     Socioeconomic History    Marital status: Single   Tobacco Use    Smoking status: Every Day     Current packs/day: 0.50     Average packs/day: 0.9 packs/day for 37.6 years (34.3 ttl pk-yrs)     Types: Cigarettes     Start date: 1987     Passive exposure: Never    Smokeless tobacco: Never    Tobacco comments:     Quit smoking last week    Substance and Sexual Activity    Alcohol use: Not Currently    Drug use: Never    Sexual activity: Not Currently     Social Determinants of Health     Financial Resource Strain: Low Risk  (7/9/2024)    Overall Financial Resource Strain (CARDIA)     Difficulty of Paying Living Expenses: Not very hard   Food Insecurity: Food Insecurity Present (7/9/2024)    Hunger Vital Sign     Worried About Running Out of Food in the Last Year: Sometimes true     Ran Out of Food in the Last Year: Sometimes true   Transportation Needs: No Transportation Needs (7/9/2024)    TRANSPORTATION NEEDS     Transportation : No   Physical Activity: Inactive (7/9/2024)    Exercise Vital Sign     Days of Exercise per Week: 0 days     Minutes of Exercise per Session: 0 min   Stress: No Stress Concern Present (7/9/2024)    Citizen of [place] [employer] - Occupational Stress  Questionnaire     Feeling of Stress : Only a little   Housing Stability: Low Risk  (7/9/2024)    Housing Stability Vital Sign     Unable to Pay for Housing in the Last Year: No     Homeless in the Last Year: No        Family History   Problem Relation Name Age of Onset    Hypertension Mother      Kidney failure Mother      Bone cancer Father           Current Outpatient Medications:     albuterol (PROVENTIL/VENTOLIN HFA) 90 mcg/actuation inhaler, Inhale 2 puffs into the lungs every 4 (four) hours as needed., Disp: , Rfl:     alprostadiL (MUSE) 250 MCG pellet, Place 250 mcg into the urethra as needed., Disp: , Rfl:     amLODIPine (NORVASC) 5 MG tablet, Take 1 tablet (5 mg total) by mouth once daily., Disp: 90 tablet, Rfl: 3    aspirin (ECOTRIN) 81 MG EC tablet, Take 1 tablet (81 mg total) by mouth once daily., Disp: 90 tablet, Rfl: 3    ergocalciferol (ERGOCALCIFEROL) 50,000 unit Cap, Take 50,000 Int'l Units by mouth., Disp: , Rfl:     metoprolol succinate (TOPROL-XL) 50 MG 24 hr tablet, Take 1 tablet (50 mg total) by mouth once daily., Disp: 90 tablet, Rfl: 3    pantoprazole (PROTONIX) 40 MG tablet, Take 1 tablet (40 mg total) by mouth once daily., Disp: 30 tablet, Rfl: 11    polyethylene glycol (GLYCOLAX) 17 gram/dose powder, Take 17 g by mouth once daily., Disp: 510 g, Rfl: 5    tamsulosin (FLOMAX) 0.4 mg Cap, Take 1 capsule (0.4 mg total) by mouth once daily., Disp: 90 capsule, Rfl: 3    atorvastatin (LIPITOR) 40 MG tablet, Take 1 tablet (40 mg total) by mouth once daily., Disp: 90 tablet, Rfl: 3    herbal drugs (FIBER DIET ORAL), Take by mouth., Disp: , Rfl:     mometasone-formoterol 50-5 mcg/actuation HFAA, Inhale 1 puff into the lungs every 8 (eight) hours as needed (wheezing SOB). (Patient not taking: Reported on 8/16/2024), Disp: 13 g, Rfl: 3    nicotine (NICODERM CQ) 7 mg/24 hr, Place 1 patch onto the skin once daily. (Patient not taking: Reported on 7/9/2024), Disp: 30 patch, Rfl: 0    nicotine,  "polacrilex, (NICORETTE) 2 mg Gum, Take 1 each (2 mg total) by mouth as needed. (Patient not taking: Reported on 10/6/2023), Disp: 190 each, Rfl: 0     ROS:                                                                                                                                                                             Review of Systems   Constitutional: Negative.    Respiratory:  Positive for shortness of breath.    Cardiovascular:  Positive for palpitations.   Gastrointestinal: Negative.    Musculoskeletal: Negative.    Skin: Negative.    Psychiatric/Behavioral: Negative.          Blood pressure 136/88, pulse 82, temperature 98.4 °F (36.9 °C), temperature source Oral, resp. rate 18, height 5' 8" (1.727 m), weight 102.6 kg (226 lb 3.2 oz), SpO2 99%.   PE:  Physical Exam  Constitutional:       Appearance: Normal appearance.   HENT:      Head: Normocephalic.   Eyes:      Extraocular Movements: Extraocular movements intact.   Cardiovascular:      Rate and Rhythm: Normal rate and regular rhythm.      Heart sounds: Murmur heard.   Pulmonary:      Effort: Pulmonary effort is normal.   Abdominal:      Palpations: Abdomen is soft.   Musculoskeletal:         General: Normal range of motion.      Cervical back: Neck supple.      Left lower leg: Edema present.   Skin:     General: Skin is warm and dry.   Neurological:      General: No focal deficit present.      Mental Status: He is alert and oriented to person, place, and time.   Psychiatric:         Mood and Affect: Mood normal.        ASSESSMENT/PLAN:  Nonobstructive Coronary Artery Disease  per [facility] 12.8.21  - Hx NSTEMI in Oct. 2021  - [facility] - nonobstructive CAD (20-30% prox/mid segment stenosis, OM1-50% ostial stenosis, and RCA-2 sequential lesions, both with 50% stenosis midvessel) (12.8.21)  - Lexiscan stress test- small area of mild inferior ischemia with reversibility (10.4.21)  - EF- 60% per ECHO 7.18.23  - Denies any CP. Reports stable RICE  - Continue Aspirin, " Atorvastatin 40 mg, metoprolol succinate 50 mg, and SL nitro prn  - Counseled on heart healthy diet, increased exercise, and smoking cessation    RICE - Stable   - EF- 60%, mod MR, mild TR per ECHO 7.18.23  - EF 60% per Echo 6.17.22    VHD  - EF-60%, moderate MR, mild TR per ECHO 7.18.23  - Moderate MR per Echo 6.17.22  - Will continue to monitor with serial Echocardiograms at least every 1-2 years    HTN   - BP at goal  - Continue amlodipine 5 mg and metoprolol succinate 50 mg  - Counseled on low sodium diet and exercise as tolerated     HLD  - LDL -63- at goal   - Continue Atorvastatin 40mg   - Counseled on low cholesterol/low fat diet and exercise as tolerated  - Repeat CMP/FLP prior to next clinic visit    Tobacco Use  - Report smokes 1/2 pack of cigarettes a day and is not ready to quit at this time.   - Counseled on the importance of smoking cessation    Prostate Cancer  - Follow up with Oncology as directed    Palpitations- resolved   - 48 Hour Holter Monitor- unremarkable, see report as above  - Continue metoprolol succinate 50 mg daily  - Advised patient to avoid/decrease caffeine intake and remain well hydrated        ECHO. Will notify of results   Follow up in Cardiology Clinic in 6 months with CMP/FLP or sooner pending results   Follow up with PCP and Oncology as directed

## 2024-08-16 NOTE — PROGRESS NOTES
Subjective:       Patient ID: Burton Vasquez is a 58 y.o. male.    Chief Complaint: Anemia and Constipation    This 58-year-old  male with CAD, hypertension, prostate cancer, CVA, and tobacco use is referred for anemia.  He presents unaccompanied.  He reports taking pantoprazole 40 mg daily and taking oral iron supplementation approximately once weekly when he remembers.  He is unsure if oral iron is worsening constipation.  He has occasional constipation where he will go a few days without a bowel movement with occasional straining and does not always feel completely evacuated.  Some days, he will have 1-2 formed to soft stools without any issues.  He denies taking anything for symptomatic relief.  His appetite is good and his weight is stable.  He denies nocturnal symptoms, fever, chills, nausea, vomiting, hematemesis, odynophagia, dysphagia, acid reflux, pyrosis, early satiety, abdominal pain, melena, hematochezia, fecal urgency, fecal incontinence, or pain with defecation.    He was hospitalized at [facility] October 9, 2022 to November 7, 2022.  He presented with shortness of breath and chest pain and reported excessive hematochezia October 3, 2022 and another episode of hematochezia with melena October 6, 2022.  Since October 6, 2022, he had felt short of breath at rest and worsened with exertion.  He was admitted to hospital medicine and received 2 units PRBCs secondary to anemia with improvement of hemoglobin and hematocrit.  He was iron deficient at that time.  GI was consulted.  He underwent EGD October 12, 2022 with findings of 3 cm hiatal hernia, LA grade C reflux esophagitis, esophageal ulcer with no bleeding and no stigmata of recent bleeding, gastritis, normal examined duodenum.  Pathology revealed mild active chronic gastritis negative for Helicobacter pylori organisms and no dysplasia identified, acute esophagitis with ulceration and no viral cytopathic effect or dysplasia identified, PAS F  stain negative for fungal elements.  He underwent colonoscopy October 12, 2022 with findings of examined portion of ileum normal, internal hemorrhoids, no specimens collected with a recommended recall of 10 years.    He takes aspirin 81 mg daily.  He smokes 10 cigarettes daily and denies alcohol use.  He denies illicit drug use.  He denies a family history of IBD, colon polyps, or colon cancer.    Review of patient's allergies indicates:  No Known Allergies    Past Medical History:   Diagnosis Date    Hypertension     Prostate cancer     Stroke      Past Surgical History:   Procedure Laterality Date    COLONOSCOPY  12/29/2016    COLONOSCOPY Left 10/12/2022    Procedure: COLONOSCOPY;  Surgeon: Meggan Lester MD;  Location: [facility] ENDOSCOPY;  Service: Gastroenterology;  Laterality: Left;     Family History:   family history includes Bone cancer in his father; Hypertension in his mother; Kidney failure in his mother.    Social History:    reports that he has been smoking cigarettes. He started smoking about 37 years ago. He has a 34.3 pack-year smoking history. He has never been exposed to tobacco smoke. He has never used smokeless tobacco. He reports that he does not currently use alcohol. He reports that he does not use drugs.    Review of Systems  Negative except as noted in the HPI.      Objective:      Physical Exam  Constitutional:       Appearance: Normal appearance.   HENT:      Head: Normocephalic.      Mouth/Throat:      Mouth: Mucous membranes are moist.   Eyes:      Extraocular Movements: Extraocular movements intact.      Conjunctiva/sclera: Conjunctivae normal.      Pupils: Pupils are equal, round, and reactive to light.   Cardiovascular:      Rate and Rhythm: Normal rate and regular rhythm.      Pulses: Normal pulses.      Heart sounds: Normal heart sounds.   Pulmonary:      Effort: Pulmonary effort is normal.      Breath sounds: Normal breath sounds.   Abdominal:      General: Bowel sounds are  normal.      Palpations: Abdomen is soft.   Musculoskeletal:         General: Normal range of motion.      Cervical back: Normal range of motion and neck supple.   Skin:     General: Skin is warm and dry.   Neurological:      General: No focal deficit present.      Mental Status: He is alert and oriented to person, place, and time.   Psychiatric:         Mood and Affect: Mood normal.         Behavior: Behavior normal.         Thought Content: Thought content normal.         Judgment: Judgment normal.         Home Medications:     Current Outpatient Medications   Medication Sig    albuterol (PROVENTIL/VENTOLIN HFA) 90 mcg/actuation inhaler Inhale 2 puffs into the lungs every 4 (four) hours as needed.    amLODIPine (NORVASC) 5 MG tablet Take 1 tablet (5 mg total) by mouth once daily.    aspirin (ECOTRIN) 81 MG EC tablet Take 1 tablet (81 mg total) by mouth once daily.    atorvastatin (LIPITOR) 40 MG tablet Take 1 tablet (40 mg total) by mouth once daily.    ergocalciferol (ERGOCALCIFEROL) 50,000 unit Cap Take 50,000 Int'l Units by mouth.    herbal drugs (FIBER DIET ORAL) Take by mouth.    metoprolol succinate (TOPROL-XL) 50 MG 24 hr tablet Take 1 tablet (50 mg total) by mouth once daily.    tamsulosin (FLOMAX) 0.4 mg Cap Take 1 capsule (0.4 mg total) by mouth once daily.    alprostadiL (MUSE) 250 MCG pellet Place 250 mcg into the urethra as needed.    mometasone-formoterol 50-5 mcg/actuation HFAA Inhale 1 puff into the lungs every 8 (eight) hours as needed (wheezing SOB). (Patient not taking: Reported on 8/16/2024)    nicotine (NICODERM CQ) 7 mg/24 hr Place 1 patch onto the skin once daily. (Patient not taking: Reported on 7/9/2024)    nicotine, polacrilex, (NICORETTE) 2 mg Gum Take 1 each (2 mg total) by mouth as needed. (Patient not taking: Reported on 10/6/2023)    pantoprazole (PROTONIX) 40 MG tablet Take 1 tablet (40 mg total) by mouth once daily.    polyethylene glycol (GLYCOLAX) 17 gram/dose powder Take 17 g by  mouth once daily.     No current facility-administered medications for this visit.     Laboratory Results:     Recent Results (from the past 4032 hour(s))   PSA, Total (Diagnostic)    Collection Time: 03/21/24 11:41 AM   Result Value Ref Range    Prostate Specific Antigen 0.16 <=4.00 ng/mL   Testosterone    Collection Time: 03/21/24 11:41 AM   Result Value Ref Range    Testosterone Total 16.24 (L) 220.91 - 715.81 ng/dL   Connective Tissue Disease Cascade    Collection Time: 03/21/24 11:41 AM   Result Value Ref Range    Antinuclear Ab 0.3 <=1.0 (Negative) U    Cyclic Citrullinated Peptide Ab <15.6 <20.0 (Negative) U    Interpretation SEE COMMENTS    C-Reactive Protein    Collection Time: 03/21/24 11:41 AM   Result Value Ref Range    CRP 6.40 (H) <5.00 mg/L   Sedimentation rate    Collection Time: 03/21/24 11:41 AM   Result Value Ref Range    Sed Rate 22 (H) 0 - 15 mm/hr   CBC with Differential    Collection Time: 03/21/24 11:41 AM   Result Value Ref Range    WBC 6.22 4.50 - 11.50 x10(3)/mcL    RBC 4.45 (L) 4.70 - 6.10 x10(6)/mcL    Hgb 11.4 (L) 14.0 - 18.0 g/dL    Hct 34.4 (L) 42.0 - 52.0 %    MCV 77.3 (L) 80.0 - 94.0 fL    MCH 25.6 (L) 27.0 - 31.0 pg    MCHC 33.1 33.0 - 36.0 g/dL    RDW 13.5 11.5 - 17.0 %    Platelet 164 130 - 400 x10(3)/mcL    MPV 10.2 7.4 - 10.4 fL    Neut % 47.8 %    Lymph % 42.4 %    Mono % 5.1 %    Eos % 3.9 %    Basophil % 0.6 %    Lymph # 2.64 0.6 - 4.6 x10(3)/mcL    Neut # 2.97 2.1 - 9.2 x10(3)/mcL    Mono # 0.32 0.1 - 1.3 x10(3)/mcL    Eos # 0.24 0 - 0.9 x10(3)/mcL    Baso # 0.04 <=0.2 x10(3)/mcL    IG# 0.01 0 - 0.04 x10(3)/mcL    IG% 0.2 %    NRBC% 0.0 %   POCT URINE DIPSTICK WITH MICROSCOPE, AUTOMATED    Collection Time: 03/22/24 10:34 AM   Result Value Ref Range    Color, UA Yellow     Spec Grav UA 1.025     pH, UA 6.0     WBC, UA neg     Nitrite, UA neg     Protein, POC neg     Glucose, UA neg     Ketones, UA neg     Urobilinogen, UA 0.2     Bilirubin, POC neg     Blood, UA small    CV  Ultrasound Bilateral Doppler Carotid    Collection Time: 06/27/24  8:30 AM   Result Value Ref Range    Right CCA prox sys 75 cm/s    Right CCA prox nunez 17 cm/s    Right CCA dist sys 61 cm/s    Right CCA dist nunez 9 cm/s    Right ICA prox sys 52 cm/s    Right ICA prox nunez 10 cm/s    Right ICA mid sys 39 cm/s    Right ICA mid nunez 14 cm/s    Right ICA dist sys 40 cm/s    Right ICA dist nunez 14 cm/s    Right ECA sys 48 cm/s    Right ECA nunez 9 cm/s    Right vertebral sys 38 cm/s    Right vertebral nunez 10 cm/s    Right ICA/CCA ratio 0.85     Right Highest ICA 52.00     Right Highest EDV 14.00     Right Highest CCA 75     Left CCA prox sys 99 cm/s    Left CCA prox nunez 24 cm/s    Left CCA dist sys 74 cm/s    Left CCA dist nunez 20 cm/s    Left ICA prox sys 39 cm/s    Left ICA prox nunez 15 cm/s    Left ICA mid sys 44 cm/s    Left ICA mid nunez 17 cm/s    Left ICA dist sys 39 cm/s    Left ICA dist nunez 14 cm/s    Left ECA sys 141 cm/s    Left ECA nunez 26 cm/s    Left vertebral sys 34 cm/s    Left vertebral nunez 15 cm/s    Left ICA/CCA ratio 0.59     Left Highest ICA 44.00     LT Highest EDV 17.00     Left Highest CCA 99    Holter monitor - 48 hour    Collection Time: 06/27/24  8:58 AM   Result Value Ref Range    Event Monitor Day 0     holter length minutes 0     Holter length hours 48     holter length dec hours 48.00     Sinus max hr 119     Sinus avg hr 87      Assessment/Plan:     Problem List Items Addressed This Visit          Oncology    Iron deficiency anemia due to chronic blood loss - Primary     Hospitalized at [facility] October 9, 2022 to November 7, 2022.    He was admitted to hospital medicine and received 2 units PRBCs secondary to anemia with improvement of hemoglobin and hematocrit; he was iron deficient at that time.    He underwent EGD October 12, 2022 with findings of 3 cm hiatal hernia, LA grade C reflux esophagitis, esophageal ulcer with no bleeding and no stigmata of recent bleeding, gastritis, normal  examined duodenum.  Pathology revealed mild active chronic gastritis negative for Helicobacter pylori organisms and no dysplasia identified, acute esophagitis with ulceration and no viral cytopathic effect or dysplasia identified, PAS F stain negative for fungal elements.    He underwent colonoscopy October 12, 2022 with findings of examined portion of ileum normal, internal hemorrhoids, no specimens collected with a recommended recall of 10 years.  GERD lifestyle modifications  Reflux precautions  Limit NSAID use  EGD; will consider need for repeat colonoscopy pending review of laboratory results  Continue oral iron supplementation as directed and pantoprazole 40 mg daily  Recommend tobacco cessation  CBC, iron profile, ferritin   Call with updates  ER precautions provided  Follow-up clinic visit with NP in 6 months (after date of scheduled procedure)         Relevant Medications    pantoprazole (PROTONIX) 40 MG tablet    Other Relevant Orders    Case Request Endoscopy: EGD (ESOPHAGOGASTRODUODENOSCOPY) (Completed)    CBC Auto Differential    Ferritin    Iron and TIBC       GI    Hiatal hernia     See above         Relevant Medications    pantoprazole (PROTONIX) 40 MG tablet    Other Relevant Orders    Case Request Endoscopy: EGD (ESOPHAGOGASTRODUODENOSCOPY) (Completed)    CBC Auto Differential    Ferritin    Iron and TIBC    Chronic idiopathic constipation     See above  Recommend soluble fiber supplementation  Avoid straining or sitting on the toilet for long periods of time  Start MiraLax 17 g as directed  Call with updates         Relevant Medications    herbal drugs (FIBER DIET ORAL)    polyethylene glycol (GLYCOLAX) 17 gram/dose powder    Internal hemorrhoids without complication     See above  He underwent colonoscopy October 12, 2022 with findings of examined portion of ileum normal, internal hemorrhoids, no specimens collected with a recommended recall of 10 years.  Recommend soluble fiber  supplementation  Avoid straining or sitting on the toilet for long periods of time  Start MiraLax 17 g as directed   Hemorrhoidal banding discussed  Call with updates            Other    Tobacco user     Recommend tobacco cessation.         Relevant Orders    Ambulatory referral/consult to Smoking Cessation Program

## 2024-09-25 ENCOUNTER — TELEPHONE (OUTPATIENT)
Dept: UROLOGY | Facility: CLINIC | Age: 59
End: 2024-09-25
Payer: MEDICAID

## 2024-09-25 NOTE — TELEPHONE ENCOUNTER
Called number provided & received approved authorization w/PA authorization #: [id number]. Expires [id number]. Faxing over paper copy of approval.    ----- Message from Sherif Correa MA sent at 9/25/2024  1:11 PM CDT -----  Regarding: RE: PA Needed for Lupron  LUPRON DEPOT 45 MG SYRINGEKIT is not covered for this Member. For formulary alternatives, please view the Member's corresponding formulary at [email or web address] or call us at [phone].  ----- Message -----  From: Perla Pulliam RN  Sent: 9/25/2024   9:12 AM CDT  To: Sherif Correa MA  Subject: PA Needed for Lupron                             Pt insurance has changed from free care to Medicaid. Please obtain PA for Lupron. Next injection is scheduled for 03Oct2024. Thanks!

## 2024-10-03 ENCOUNTER — OFFICE VISIT (OUTPATIENT)
Dept: UROLOGY | Facility: CLINIC | Age: 59
End: 2024-10-03
Payer: MEDICAID

## 2024-10-03 VITALS
HEART RATE: 94 BPM | BODY MASS INDEX: 33.7 KG/M2 | WEIGHT: 222.38 LBS | HEIGHT: 68 IN | TEMPERATURE: 98 F | DIASTOLIC BLOOD PRESSURE: 68 MMHG | OXYGEN SATURATION: 98 % | SYSTOLIC BLOOD PRESSURE: 127 MMHG

## 2024-10-03 DIAGNOSIS — N13.8 BPH WITH OBSTRUCTION/LOWER URINARY TRACT SYMPTOMS: ICD-10-CM

## 2024-10-03 DIAGNOSIS — N40.1 BPH WITH OBSTRUCTION/LOWER URINARY TRACT SYMPTOMS: ICD-10-CM

## 2024-10-03 DIAGNOSIS — C61 PROSTATE CANCER: Primary | ICD-10-CM

## 2024-10-03 PROCEDURE — 96402 CHEMO HORMON ANTINEOPL SQ/IM: CPT | Mod: PBBFAC

## 2024-10-03 PROCEDURE — 99214 OFFICE O/P EST MOD 30 MIN: CPT | Mod: S$PBB,,, | Performed by: UROLOGY

## 2024-10-03 PROCEDURE — 81001 URINALYSIS AUTO W/SCOPE: CPT | Mod: PBBFAC | Performed by: UROLOGY

## 2024-10-03 PROCEDURE — 3008F BODY MASS INDEX DOCD: CPT | Mod: CPTII,,, | Performed by: UROLOGY

## 2024-10-03 PROCEDURE — 3078F DIAST BP <80 MM HG: CPT | Mod: CPTII,,, | Performed by: UROLOGY

## 2024-10-03 PROCEDURE — 99215 OFFICE O/P EST HI 40 MIN: CPT | Mod: PBBFAC | Performed by: UROLOGY

## 2024-10-03 PROCEDURE — 3046F HEMOGLOBIN A1C LEVEL >9.0%: CPT | Mod: CPTII,,, | Performed by: UROLOGY

## 2024-10-03 PROCEDURE — 3074F SYST BP LT 130 MM HG: CPT | Mod: CPTII,,, | Performed by: UROLOGY

## 2024-10-03 RX ORDER — ENZALUTAMIDE 40 MG/1
160 TABLET ORAL DAILY
Qty: 120 TABLET | Refills: 11 | Status: SHIPPED | OUTPATIENT
Start: 2024-10-03 | End: 2024-10-09

## 2024-10-03 RX ADMIN — LEUPROLIDE ACETATE 45 MG: KIT at 10:10

## 2024-10-09 ENCOUNTER — HOSPITAL ENCOUNTER (OUTPATIENT)
Dept: CARDIOLOGY | Facility: HOSPITAL | Age: 59
Discharge: HOME | End: 2024-10-09
Attending: NURSE PRACTITIONER
Payer: MEDICAID

## 2024-10-09 ENCOUNTER — TELEPHONE (OUTPATIENT)
Dept: CARDIOLOGY | Facility: CLINIC | Age: 59
End: 2024-10-09
Payer: MEDICAID

## 2024-10-09 VITALS
BODY MASS INDEX: 33.65 KG/M2 | WEIGHT: 222 LBS | SYSTOLIC BLOOD PRESSURE: 156 MMHG | DIASTOLIC BLOOD PRESSURE: 89 MMHG | HEIGHT: 68 IN

## 2024-10-09 DIAGNOSIS — C61 PROSTATE CANCER: Primary | ICD-10-CM

## 2024-10-09 DIAGNOSIS — I38 VHD (VALVULAR HEART DISEASE): ICD-10-CM

## 2024-10-09 LAB
APICAL FOUR CHAMBER EJECTION FRACTION: 61 %
APICAL TWO CHAMBER EJECTION FRACTION: 64 %
AV INDEX (PROSTH): 0.79
AV MEAN GRADIENT: 5.1 MMHG
AV PEAK GRADIENT: 9 MMHG
AV VALVE AREA BY VELOCITY RATIO: 2.8 CM²
AV VALVE AREA: 3 CM²
AV VELOCITY RATIO: 0.73
BSA FOR ECHO PROCEDURE: 2.2 M2
CV ECHO LV RWT: 0.57 CM
DOP CALC AO PEAK VEL: 1.5 M/S
DOP CALC AO VTI: 31.1 CM
DOP CALC LVOT AREA: 3.8 CM2
DOP CALC LVOT DIAMETER: 2.2 CM
DOP CALC LVOT PEAK VEL: 1.1 M/S
DOP CALC LVOT STROKE VOLUME: 93.5 CM3
DOP CALC MV VTI: 25.7 CM
DOP CALCLVOT PEAK VEL VTI: 24.6 CM
E WAVE DECELERATION TIME: 89.46 MSEC
E/A RATIO: 1.16
ECHO LV POSTERIOR WALL: 1.2 CM (ref 0.6–1.1)
FRACTIONAL SHORTENING: 40.5 % (ref 28–44)
INTERVENTRICULAR SEPTUM: 1.3 CM (ref 0.6–1.1)
IVC DIAMETER: 1.8 CM
LEFT ATRIUM AREA SYSTOLIC (APICAL 2 CHAMBER): 20.6 CM2
LEFT ATRIUM AREA SYSTOLIC (APICAL 4 CHAMBER): 19.94 CM2
LEFT ATRIUM SIZE: 4.88 CM
LEFT ATRIUM VOLUME INDEX MOD: 28 ML/M2
LEFT ATRIUM VOLUME MOD: 60.01 ML
LEFT INTERNAL DIMENSION IN SYSTOLE: 2.5 CM (ref 2.1–4)
LEFT VENTRICLE DIASTOLIC VOLUME INDEX: 35.62 ML/M2
LEFT VENTRICLE DIASTOLIC VOLUME: 76.22 ML
LEFT VENTRICLE END DIASTOLIC VOLUME APICAL 2 CHAMBER: 101.38 ML
LEFT VENTRICLE END DIASTOLIC VOLUME APICAL 4 CHAMBER: 97.92 ML
LEFT VENTRICLE END SYSTOLIC VOLUME APICAL 2 CHAMBER: 62.18 ML
LEFT VENTRICLE END SYSTOLIC VOLUME APICAL 4 CHAMBER: 56.09 ML
LEFT VENTRICLE MASS INDEX: 88.4 G/M2
LEFT VENTRICLE SYSTOLIC VOLUME INDEX: 10.5 ML/M2
LEFT VENTRICLE SYSTOLIC VOLUME: 22.46 ML
LEFT VENTRICULAR INTERNAL DIMENSION IN DIASTOLE: 4.2 CM (ref 3.5–6)
LEFT VENTRICULAR MASS: 189.2 G
LV LATERAL E/E' RATIO: 11.27 M/S
LVED V (TEICH): 76.22 ML
LVES V (TEICH): 22.46 ML
LVOT MG: 2.45 MMHG
LVOT MV: 0.75 CM/S
MV MEAN GRADIENT: 3 MMHG
MV PEAK A VEL: 1.07 M/S
MV PEAK E VEL: 1.24 M/S
MV PEAK GRADIENT: 6 MMHG
MV STENOSIS PRESSURE HALF TIME: 25.94 MS
MV VALVE AREA BY CONTINUITY EQUATION: 3.64 CM2
MV VALVE AREA P 1/2 METHOD: 8.48 CM2
OHS LV EJECTION FRACTION SIMPSONS BIPLANE MOD: 63 %
PISA MRMAX VEL: 6.18 M/S
PISA TR MAX VEL: 1.98 M/S
RA MAJOR: 4.6 CM
RA PRESSURE ESTIMATED: 3 MMHG
RV TB RVSP: 5 MMHG
TDI LATERAL: 0.11 M/S
TR MAX PG: 16 MMHG
TRICUSPID ANNULAR PLANE SYSTOLIC EXCURSION: 2.54 CM
TV REST PULMONARY ARTERY PRESSURE: 19 MMHG
Z-SCORE OF LEFT VENTRICULAR DIMENSION IN END DIASTOLE: -4.94
Z-SCORE OF LEFT VENTRICULAR DIMENSION IN END SYSTOLE: -4.09

## 2024-10-09 PROCEDURE — 93306 TTE W/DOPPLER COMPLETE: CPT

## 2024-10-09 RX ORDER — ENZALUTAMIDE 40 MG/1
160 TABLET ORAL DAILY
Qty: 120 TABLET | Refills: 11 | Status: SHIPPED | OUTPATIENT
Start: 2024-10-09

## 2024-10-10 ENCOUNTER — TELEPHONE (OUTPATIENT)
Dept: CARDIOLOGY | Facility: CLINIC | Age: 59
End: 2024-10-10
Payer: MEDICAID

## 2025-01-02 ENCOUNTER — HOSPITAL ENCOUNTER (OUTPATIENT)
Dept: RADIOLOGY | Facility: HOSPITAL | Age: 60
Discharge: HOME | End: 2025-01-02
Attending: UROLOGY
Payer: MEDICAID

## 2025-01-02 DIAGNOSIS — C61 PROSTATE CANCER: ICD-10-CM

## 2025-01-02 LAB
CREAT SERPL-MCNC: 1.37 MG/DL (ref 0.72–1.25)
GFR SERPLBLD CREATININE-BSD FMLA CKD-EPI: 60 ML/MIN/1.73/M2

## 2025-01-02 PROCEDURE — 25500020 PHARM REV CODE 255

## 2025-01-02 PROCEDURE — 82565 ASSAY OF CREATININE: CPT | Performed by: UROLOGY

## 2025-01-02 PROCEDURE — A9503 TC99M MEDRONATE: HCPCS | Performed by: UROLOGY

## 2025-01-02 PROCEDURE — 77081 DXA BONE DENSITY APPENDICULR: CPT | Mod: TC

## 2025-01-02 PROCEDURE — 72194 CT PELVIS W/O & W/DYE: CPT | Mod: TC

## 2025-01-02 PROCEDURE — 78306 BONE IMAGING WHOLE BODY: CPT | Mod: TC

## 2025-01-02 RX ORDER — TC 99M MEDRONATE 20 MG/10ML
25 INJECTION, POWDER, LYOPHILIZED, FOR SOLUTION INTRAVENOUS
Status: COMPLETED | OUTPATIENT
Start: 2025-01-02 | End: 2025-01-02

## 2025-01-02 RX ADMIN — TECHNETIUM TC 99M MEDRONATE 26.2 MILLICURIE: 20 INJECTION, POWDER, LYOPHILIZED, FOR SOLUTION INTRAVENOUS at 07:01

## 2025-01-02 RX ADMIN — IOHEXOL 100 ML: 350 INJECTION, SOLUTION INTRAVENOUS at 07:01

## 2025-01-08 ENCOUNTER — LAB VISIT (OUTPATIENT)
Dept: LAB | Facility: HOSPITAL | Age: 60
End: 2025-01-08
Attending: UROLOGY
Payer: MEDICAID

## 2025-01-08 ENCOUNTER — OFFICE VISIT (OUTPATIENT)
Dept: UROLOGY | Facility: CLINIC | Age: 60
End: 2025-01-08
Payer: MEDICAID

## 2025-01-08 VITALS
TEMPERATURE: 98 F | SYSTOLIC BLOOD PRESSURE: 136 MMHG | HEART RATE: 97 BPM | DIASTOLIC BLOOD PRESSURE: 81 MMHG | RESPIRATION RATE: 19 BRPM | HEIGHT: 68 IN | OXYGEN SATURATION: 97 % | WEIGHT: 223 LBS | BODY MASS INDEX: 33.8 KG/M2

## 2025-01-08 DIAGNOSIS — N13.8 BPH WITH OBSTRUCTION/LOWER URINARY TRACT SYMPTOMS: ICD-10-CM

## 2025-01-08 DIAGNOSIS — C61 PROSTATE CANCER: ICD-10-CM

## 2025-01-08 DIAGNOSIS — N40.1 BPH WITH OBSTRUCTION/LOWER URINARY TRACT SYMPTOMS: ICD-10-CM

## 2025-01-08 DIAGNOSIS — C61 PROSTATE CANCER: Primary | ICD-10-CM

## 2025-01-08 LAB
BILIRUB SERPL-MCNC: NORMAL MG/DL
BLOOD URINE, POC: NORMAL
COLOR, POC UA: YELLOW
GLUCOSE UR QL STRIP: NORMAL
KETONES UR QL STRIP: NORMAL
LEUKOCYTE ESTERASE URINE, POC: NORMAL
NITRITE, POC UA: NORMAL
PH, POC UA: 5.5
PROTEIN, POC: NORMAL
PSA SERPL-MCNC: <0.1 NG/ML
SPECIFIC GRAVITY, POC UA: 1.02
UROBILINOGEN, POC UA: 0.2

## 2025-01-08 PROCEDURE — 84153 ASSAY OF PSA TOTAL: CPT

## 2025-01-08 PROCEDURE — 1159F MED LIST DOCD IN RCRD: CPT | Mod: CPTII,,, | Performed by: UROLOGY

## 2025-01-08 PROCEDURE — 3075F SYST BP GE 130 - 139MM HG: CPT | Mod: CPTII,,, | Performed by: UROLOGY

## 2025-01-08 PROCEDURE — 99214 OFFICE O/P EST MOD 30 MIN: CPT | Mod: S$PBB,,, | Performed by: UROLOGY

## 2025-01-08 PROCEDURE — 3008F BODY MASS INDEX DOCD: CPT | Mod: CPTII,,, | Performed by: UROLOGY

## 2025-01-08 PROCEDURE — 81001 URINALYSIS AUTO W/SCOPE: CPT | Mod: PBBFAC | Performed by: UROLOGY

## 2025-01-08 PROCEDURE — 99215 OFFICE O/P EST HI 40 MIN: CPT | Mod: PBBFAC | Performed by: UROLOGY

## 2025-01-08 PROCEDURE — 36415 COLL VENOUS BLD VENIPUNCTURE: CPT

## 2025-01-08 PROCEDURE — 3079F DIAST BP 80-89 MM HG: CPT | Mod: CPTII,,, | Performed by: UROLOGY

## 2025-01-08 NOTE — PROGRESS NOTES
CC:  Prostate cancer    HPI:  Burton Vasquez is a 58 y.o. male seen for follow-up of prostate cancer.  He was diagnosed with prostate cancer in September 2020.  His initial PSA was 117.  Biopsy showed [place] 4+3.  MRI showed extracapsular extension and invasion of the seminal vesicles was strongly suspected.  He had EBRT from 19 October 2020 to 10 December 2020. Lupron was originally scheduled to go for three years.  He missed an injection in 2021 and the PSA immediately odalis to 7 and he began receiving Lupron again. The PSA then came down.  He is supposed to be taking Xtandi but is not. Last dose of Lupron 10/3/2024. He did have multiple imaging studies completed prior to today's visit to assess for osteoporosis and for metastatic disease which were all without significant findings.   He is on tamsulosin for urinary frequency, nocturia, urgency. This is helping with his symptoms, no symptoms reported today.     Urinalysis:  Results for orders placed or performed in visit on 01/08/25   POCT URINE DIPSTICK WITH MICROSCOPE, AUTOMATED   Result Value Ref Range    Color, UA Yellow     Spec Grav UA 1.025     pH, UA 5.5     WBC, UA neg     Nitrite, UA neg     Protein, POC neg     Glucose, UA neg     Ketones, UA neg     Urobilinogen, UA 0.2     Bilirubin, POC neg     Blood, UA small        Lab Results:  PSA History:    08/31/20 11:15 01/07/21 08:00 04/12/21 07:54 09/02/21 13:34 12/23/21 12:00 01/07/22 12:00 08/19/22 13:28 12/05/22 12:49 03/06/23 07:40 09/05/23 08:00 03/21/24 11:41 08/16/24 08:45 01/08/25 11:15   117.0 (H) 5.09 (H) 4.03 (H) 2.55 7.21 (H) 5.97 (H) 0.84 0.59 0.49 0.31 0.16 0.11 <0.10       Results for orders placed or performed in visit on 08/16/24   Testosterone   Result Value Ref Range    Testosterone Total 23.14 (L) 220.91 - 715.81 ng/dL     Recent Labs     01/02/25  0715   CREATININE 1.37*     Imaging:  NM bone scan  No evidence of metastatic disease    DEXA scan  No evidence of  osteopenia/osteoporosis    CT pelvis  Prostate upper limit normal in size. No pelvic lymphadenopathy, no osseous lesions.     ROS:  All systems reviewed and are negative except as documented in HPI and/or Assessment and Plan.     Patient Active Problem List:     Patient Active Problem List   Diagnosis    Coronary artery disease involving native coronary artery of native heart without angina pectoris    Primary hypertension    Tobacco user    RICE (dyspnea on exertion)    Prostate CA    Anemia requiring transfusions    Esophagitis    Chronic hemorrhagic anemia    Iron deficiency anemia    Iron deficiency anemia due to chronic blood loss    Mixed hyperlipidemia    Olecranon bursitis of right elbow    BMI 34.0-34.9,adult    Chronic obstructive pulmonary disease    Restrictive lung disease    Muscle tension dysphonia    JOSE on CPAP    Hiatal hernia    Internal hemorrhoids without complication    Chronic idiopathic constipation        Past Medical History:  Past Medical History:   Diagnosis Date    Hypertension     Prostate cancer     Stroke         Past Surgical History:  Past Surgical History:   Procedure Laterality Date    COLONOSCOPY  2016    COLONOSCOPY Left 10/12/2022    Procedure: COLONOSCOPY;  Surgeon: Meggan Lester MD;  Location: [facility] ENDOSCOPY;  Service: Gastroenterology;  Laterality: Left;        Family History:  Family History   Problem Relation Name Age of Onset    Hypertension Mother      Kidney failure Mother      Bone cancer Father          Social History:  Social History     Socioeconomic History    Marital status: Single   Tobacco Use    Smoking status: Former     Current packs/day: 0.00     Average packs/day: 0.9 packs/day for 37.7 years (34.3 ttl pk-yrs)     Types: Cigarettes     Start date:      Quit date: 2024     Years since quittin.3     Passive exposure: Never    Smokeless tobacco: Never    Tobacco comments:     Quit smoking last month   Substance and Sexual Activity     Alcohol use: Not Currently    Drug use: Never    Sexual activity: Not Currently     Social Drivers of Health     Financial Resource Strain: Low Risk  (7/9/2024)    Overall Financial Resource Strain (CARDIA)     Difficulty of Paying Living Expenses: Not very hard   Food Insecurity: Food Insecurity Present (7/9/2024)    Hunger Vital Sign     Worried About Running Out of Food in the Last Year: Sometimes true     Ran Out of Food in the Last Year: Sometimes true   Transportation Needs: No Transportation Needs (7/9/2024)    TRANSPORTATION NEEDS     Transportation : No   Physical Activity: Inactive (7/9/2024)    Exercise Vital Sign     Days of Exercise per Week: 0 days     Minutes of Exercise per Session: 0 min   Stress: No Stress Concern Present (7/9/2024)    [employer] - Occupational Stress Questionnaire     Feeling of Stress : Only a little   Housing Stability: Low Risk  (7/9/2024)    Housing Stability Vital Sign     Unable to Pay for Housing in the Last Year: No     Homeless in the Last Year: No        Allergies:  Review of patient's allergies indicates:  No Known Allergies     Objective:  Vitals:    01/08/25 0943   BP: 136/81   Pulse:    Resp:    Temp:      General:  Well developed, well nourished adult male in no acute distress  Abdomen: Soft, nontender, no masses  Extremities:  No clubbing, cyanosis, or edema  Neurologic:  Grossly intact  Musculoskeletal:  Normal tone  Penis:  Circumcised, no lesions  Scrotum:  No hydrocele  Testicles:  Nontender, no masses  Epididymis:  Normal without masses  Prostate exam:  Nontender, symmetrical, no nodules  Rectal:  Normal rectal tone    Last MADHU:       Assessment:  1. Prostate cancer  - POCT URINE DIPSTICK WITH MICROSCOPE, AUTOMATED    2. BPH with obstruction/lower urinary tract symptoms     Plan:  - PSA not completed prior to today's visit, needs to complete following visit. PSA remains undetectable (<0.10 today)  - CT pelvis, NM bone scan, and  DEXA scan all reviewed. No osteoporosis, negative for metastatic disease.   - Lupron at next visit  - Continue Flomax    Return appointment:  4 months with PSA, will need Lupron at that time    Karl Scanlon MD  [facility] PGY-III

## 2025-01-09 NOTE — PROGRESS NOTES
I saw and evaluated the patient with the resident.  I discussed with the resident and agree with the resident's history, physical, assessment, findings and care plan as documented in the resident's note.        Birdie Contreras DO  Urology  [facility]

## 2025-02-13 RX ORDER — INSULIN LISPRO 100 [IU]/ML
0-20 INJECTION, SOLUTION INTRAVENOUS; SUBCUTANEOUS 4 TIMES DAILY PRN
COMMUNITY
End: 2025-02-18

## 2025-02-13 RX ORDER — BLOOD-GLUCOSE SENSOR
1 EACH MISCELLANEOUS
COMMUNITY
Start: 2024-11-04

## 2025-02-18 ENCOUNTER — OFFICE VISIT (OUTPATIENT)
Dept: INTERNAL MEDICINE | Facility: CLINIC | Age: 60
End: 2025-02-18
Payer: MEDICAID

## 2025-02-18 ENCOUNTER — CLINICAL SUPPORT (OUTPATIENT)
Dept: INTERNAL MEDICINE | Facility: CLINIC | Age: 60
End: 2025-02-18
Attending: STUDENT IN AN ORGANIZED HEALTH CARE EDUCATION/TRAINING PROGRAM
Payer: MEDICAID

## 2025-02-18 VITALS
TEMPERATURE: 98 F | DIASTOLIC BLOOD PRESSURE: 94 MMHG | BODY MASS INDEX: 34.06 KG/M2 | SYSTOLIC BLOOD PRESSURE: 133 MMHG | HEART RATE: 81 BPM | RESPIRATION RATE: 20 BRPM | OXYGEN SATURATION: 98 % | WEIGHT: 224 LBS

## 2025-02-18 DIAGNOSIS — R73.03 PREDIABETES: ICD-10-CM

## 2025-02-18 DIAGNOSIS — E11.9 TYPE 2 DIABETES MELLITUS WITHOUT COMPLICATION, WITHOUT LONG-TERM CURRENT USE OF INSULIN: Primary | ICD-10-CM

## 2025-02-18 DIAGNOSIS — N40.1 BPH WITH OBSTRUCTION/LOWER URINARY TRACT SYMPTOMS: ICD-10-CM

## 2025-02-18 DIAGNOSIS — I10 HYPERTENSION, UNSPECIFIED TYPE: ICD-10-CM

## 2025-02-18 DIAGNOSIS — N13.8 BPH WITH OBSTRUCTION/LOWER URINARY TRACT SYMPTOMS: ICD-10-CM

## 2025-02-18 DIAGNOSIS — R73.03 PREDIABETES: Primary | ICD-10-CM

## 2025-02-18 DIAGNOSIS — R06.2 WHEEZING: ICD-10-CM

## 2025-02-18 DIAGNOSIS — Z87.891 PERSONAL HISTORY OF NICOTINE DEPENDENCE: ICD-10-CM

## 2025-02-18 PROBLEM — R06.09 DOE (DYSPNEA ON EXERTION): Status: RESOLVED | Noted: 2022-06-07 | Resolved: 2025-02-18

## 2025-02-18 LAB — HGB, POC: 8.6 G/DL (ref 14–18)

## 2025-02-18 PROCEDURE — 85018 HEMOGLOBIN: CPT | Mod: PBBFAC | Performed by: STUDENT IN AN ORGANIZED HEALTH CARE EDUCATION/TRAINING PROGRAM

## 2025-02-18 PROCEDURE — 99214 OFFICE O/P EST MOD 30 MIN: CPT | Mod: PBBFAC | Performed by: STUDENT IN AN ORGANIZED HEALTH CARE EDUCATION/TRAINING PROGRAM

## 2025-02-18 PROCEDURE — 92228 IMG RTA DETC/MNTR DS PHY/QHP: CPT | Mod: TC,PBBFAC | Performed by: INTERNAL MEDICINE

## 2025-02-18 PROCEDURE — 92228 IMG RTA DETC/MNTR DS PHY/QHP: CPT | Mod: PBBFAC

## 2025-02-18 RX ORDER — INSULIN GLARGINE-YFGN 100 [IU]/ML
10 INJECTION, SOLUTION SUBCUTANEOUS NIGHTLY
Qty: 9 ML | Refills: 3 | Status: SHIPPED | OUTPATIENT
Start: 2025-02-18 | End: 2026-02-18

## 2025-02-18 RX ORDER — METFORMIN HYDROCHLORIDE 1000 MG/1
1000 TABLET ORAL 2 TIMES DAILY WITH MEALS
Qty: 180 TABLET | Refills: 3 | Status: SHIPPED | OUTPATIENT
Start: 2025-02-18 | End: 2026-02-18

## 2025-02-18 RX ORDER — LISINOPRIL 5 MG/1
5 TABLET ORAL DAILY
Qty: 90 TABLET | Refills: 3 | Status: SHIPPED | OUTPATIENT
Start: 2025-02-18 | End: 2026-02-18

## 2025-02-18 RX ORDER — NICOTINE 7MG/24HR
1 PATCH, TRANSDERMAL 24 HOURS TRANSDERMAL DAILY
Qty: 30 PATCH | Refills: 2 | Status: SHIPPED | OUTPATIENT
Start: 2025-02-18

## 2025-02-18 RX ORDER — TAMSULOSIN HYDROCHLORIDE 0.4 MG/1
1 CAPSULE ORAL
Qty: 90 CAPSULE | Refills: 3 | Status: SHIPPED | OUTPATIENT
Start: 2025-02-18

## 2025-02-18 RX ORDER — ALBUTEROL SULFATE 90 UG/1
2 INHALANT RESPIRATORY (INHALATION) EVERY 4 HOURS PRN
Qty: 18 G | Refills: 2 | Status: SHIPPED | OUTPATIENT
Start: 2025-02-18

## 2025-02-18 RX ORDER — INSULIN LISPRO 100 [IU]/ML
20 INJECTION, SOLUTION INTRAVENOUS; SUBCUTANEOUS
Qty: 54 ML | Refills: 3 | Status: SHIPPED | OUTPATIENT
Start: 2025-02-18 | End: 2026-02-18

## 2025-02-18 RX ORDER — AMLODIPINE BESYLATE 5 MG/1
5 TABLET ORAL DAILY
Qty: 90 TABLET | Refills: 3 | Status: SHIPPED | OUTPATIENT
Start: 2025-02-18 | End: 2026-02-18

## 2025-02-18 NOTE — PROGRESS NOTES
Burton Vasquez is a 59 y.o. male here for a diabetic eye screening with non-dilated fundus photos per Carolina Zimmer MD.    Patient cooperative?: Yes  Small pupils?: No  Last eye exam: unknown    For exam results, see Encounter Report.

## 2025-02-18 NOTE — PROGRESS NOTES
PROGRESS NOTE  Ambulatory Clinic  Burton Vasquez [id number] 2/18/2025  Chief Complaint  Follow-up (Medication Management)     RALEIGH Vasquez is a 59 y.o. male who has a past medical history of Hypertension, Prostate cancer, and Stroke.  He presents to clinic today for follow-up of chronic medical conditions. Denies any complaints at this time.    ROS  Constitutional: no fever, fatigue, weakness  Eye: no vision loss, eye redness, drainage, or pain  ENMT: no sore throat, ear pain, sinus pain/congestion, nasal congestion/drainage  Respiratory: no cough, no wheezing, dyspnea on exertion  Cardiovascular: no chest pain, no palpitations, no edema  Gastrointestinal: no nausea, vomiting, or diarrhea. No abdominal pain  Genitourinary: no dysuria, no urinary frequency or urgency, no hematuria  Hema/Lymph: no abnormal bruising or bleeding  Endocrine: no heat or cold intolerance, no excessive thirst or excessive urination  Musculoskeletal: no muscle or joint pain, no joint swelling  Integumentary: no skin rash or abnormal lesion  Neurologic: no headache, no dizziness, no weakness or numbness     PE  Vitals:    02/18/25 0835   BP: (!) 133/94   Pulse: 81   Resp: 20   Temp: 98.4 °F (36.9 °C)      GA: Alert, comfortable, no acute distress.   HEENT: Adequate dentition. No visible oropharyngeal abnormalities. No scleral icterus or JVD. No visible thyromegally.   Pulmonary: Chest wall symmetric. No accessory muscle use. No abnormalities on percussion. No tenderness to palpation. Clear to auscultation A/P/L bilaterally. No wheezing, crackles, or rhonchi.  Cardiac: RRR S1 & S2 w/o rubs/murmurs/gallops. No lower extremity edema.   Abdominal: Bowel sounds present x 4. No appreciable hepatosplenomegaly.  Skin: No jaundice, rashes, or visible lesions.  Lymphatic: No cervical or inguinal lymphadenopathy.  Musculoskeletal: Moves all extremities 5/5.  Extremities: No clubbing or cyanosis.  Neuro: CN grossly intact, normal gait, normal  coordination, no sensory or motor deficits    Diabetic foot exam 2/2025:   Left: Normal monofilament exam, 1+ dp/pt, callus on heel, thickened trimmed toenails, no wounds/ulcers  Right: Normal monofilament exam, 1+ dp/pt, callus on heel, thickened trimmed toenails, no wounds/ulcers    Assessment/Plan  Gastritis  Esophagitis  Anemia secondary to above  Blood in stool - resolved  -symptoms resolved, continues to take PPI  -hemoglobin stable and continues to improve.  -Follows with GI, plan for EGD in 2/2025    Type 2 diabetes mellitus   - POC A1c 8.6 2/2025, prev 11.7 in 9/2024, repeat in 3 months  - Humalog 20u TIDWM, Glargine 10u qhs, metformin 1000mg BID (increased from 500 mg daily)  - DM microalb creatinine ordered 2/2025  - DM eye exam 2/2025  - DM foot exam 2/2025  - Continue statin, start ACEi    Coronary artery disease  Hypertension  Elevated ASCVD risk score  Nicotine use disorder  Obesity  - /94  -start lisinopril 5 mg daily, continue amlodipine 5 mg daily, metoprolol succinate 50 mg daily  - Counseled on diet and exercise  -currently not interested in weight loss medications  -last [facility] 2021  -echo was ordered by Cardiology but patient has not scheduled this yet  -BNP was normal, chest x-ray was unremarkable, chest CT low dose was also unremarkable in 6/2024  -patient reports improvement in shortness a breath  - PFT demonstrates moderate restrictive pathology no underlying COPD, flow volume loops appeared to have signs extrinsic variable obstruction, he was referred to ENT to rule out any vocal cord paralysis or pathology  -Order nicotine patches 2/2025    History of prostate cancer s/p prostatectomy with subsequent low testosterone  S/p XRT  -Continue following with Oncology  -missed first lupron injection  -f/u with urology  -s/p xrt  -dexa 7/11/22 WNL    PHTN  JOSE on CPAP  ESS16  Sleep study in 6/2024: auto-CPAP 5-20 cm H2O  Reports nightly compliance with CPAP  No signs of DVT    Preventative  DM  (age>45 or HTN): repeat at next visit  Lipid (age>35): ldl 63 (7mo ago)  Declined STD screening  Lung Ca (30PY smoker age 55-79 or quit in last 15y): Lung-RADS 1 in 6/2024  Colon Ca: (age 50-75-annual FOBT, 5y flex + FOBTq3 or 10y c-scope):  Last colonoscopy 2022- for any malignancy repeat in 2032  Immunizations (generally - flu, shingrix, t-dap, PCV, Covid):  Declines all vaccines at this time (pneumonia today, tetanus next time)      RTC in 6 months.  A1c in 3 months.      Future Appointments   Date Time Provider Department Center   2/24/2025  9:15 AM Luz Acosta FNP [facility] CARD [facility]   3/14/2025  9:30 AM Magnolia Alexander FNP [facility] GASTRO [facility]   4/9/2025 10:45 AM Birdie Ramirez DO [facility] UROLO [facility]     Carolina Zimmer MD  [facility]  2/18/2025

## 2025-02-20 NOTE — PROGRESS NOTES
CHIEF COMPLAINT:   Chief Complaint   Patient presents with    Follow-up     Patient c/o SOB on exertion relieved with rest.                     Review of patient's allergies indicates:  No Known Allergies                                       HPI:  Burton Vasquez 59 y.o. male with a past medical history of Hx NSTEMI/Nonobstructive CAD per [facility] Dec. 2021, VHD, Hx CVA, DM II, HTN, HLD, gastritis, esophagitis, prostate cancer status post radiation, chemotherapy, and Tobacco Abuse who presents to cardiology clinic for routine follow up and results of testing. Echocardiogram obtained on 10.9.24 revealed preserved EF of 63% and moderate MR which is stable from previous Echo July 2023.  Previous ischemic evaluation includes a Left Heart Catheterization in December 2021 in the setting of NSTEMI that revealed nonobstructive CAD (see operative report below).     Today the patient continues to endorse stable shortness of breath with exertion that has not progressed or worsened since last clinic visit.  He remains able to perform his basic ADLs without experiencing any angina or angina equivalent symptoms.  However, he admits that he does not really exert himself often, but does occasionally lift weights.  He denies any further cardiac complaints of chest pain, palpitations, orthopnea, PND, peripheral edema, claudication, lightheadedness, dizziness, near-syncope or syncope.  The patient reports that he previously quit smoking for approximately 4 months but unfortunately restarted again.  He is currently smoking half a pack of cigarettes per day but is hoping to wean again.    Background Information:  Patient was noted to have a hospital admission from October 2021 with NSTEMI, troponin peak 0.12. He completed a Lexiscan stress test on 10.4.21 that revealed a small area of mild inferior ischemia.  Patient underwent a subsequent Coronary Angiogram procedure on 12.8.21 which revealed nonobstructive coronary artery disease. He was  also noted to have positive blood cultures for Streptococcus bacteremia and underwent a subsequent TAQUERIA on 10.8.21 in which a 12 x 7 mm echodensity was noted on MV (consistent with being a vegetation).  The patient completed a course of antibiotics and has had repeat negative cultures.      CARDIAC TESTING:  ECHO 10.9.24   Left Ventricle: The left ventricle is normal in size. Mildly increased wall thickness. There is normal systolic function. Biplane (2D) method of discs ejection fraction is 63%.    Right Ventricle: Normal right ventricular cavity size. Systolic function is normal.    Left Atrium: Left atrium is moderately dilated.    Right Atrium: Right atrium is mildly dilated.    Mitral Valve: There is moderate regurgitation with an eccentric jet and a wall hugging jet.    IVC/SVC: Normal venous pressure at 3 mmHg.       Carotid US 6.27.24  The right internal carotid artery demonstrated no hemodynamically significant stenosis.  There is minimal amount of plaque in the right carotid bulb.  The right vertebral artery was patent with antegrade flow.     The left internal carotid artery demonstrated no hemodynamically significant stenosis.  There is a minimal amount of plaque in the left carotid bulb.  The left vertebral artery was patent with antegrade flow.    48 Hour Holter 6.27.24  Underlying rhythm:   Sinus  Arrythmia:  No significant arrhythmia noted   Pauses:  No significant pause noted  Symptoms:  No diary returned.  No symptoms reported.   Events:  Patient did not indicate any symptomatic episode during this study           ECHO 7.18.23  The left ventricle is normal in size with moderate concentric hypertrophy and normal systolic function.  Normal right ventricular size with normal right ventricular systolic function.  The estimated ejection fraction is 60%.  Grade I left ventricular diastolic dysfunction.  Moderate mitral regurgitation.  Mild tricuspid regurgitation.  Normal central venous pressure (3  mmHg).  The estimated PA systolic pressure is 35 mmHg.  Mild left atrial enlargement.    Echocardiogram 6.17.22:  The left ventricle is normal in size with mild concentric hypertrophy and normal systolic function.  The estimated ejection fraction is 60%.  Normal left ventricular diastolic function.  Normal right ventricular size with normal right ventricular systolic function.  Mild right atrial enlargement.  Moderate mitral regurgitation.  Mild tricuspid regurgitation.  Moderate left atrial enlargement.    Coronary Angiogram December 8, 2021:  Left main: Large vessel that tapers distally. No stenosis.  LAD: Large vessel. 20 to 30% proximal/mid segment stenosis.  Diagonal 1: Small vessel. No stenosis.  Diagonal 2: Tiny size vessel. No stenosis.  Circumflex: Large vessel. No stenosis.  Obtuse marginal 1: Small vessel. 50% ostial stenosis.  Left PDA: Small size vessel. No stenosis.  RCA: Medium sized vessel. 2 sequential lesions, both with 50% stenosis mid vessel.  PLB: Tiny to small vessel. No stenosis.  PDA: Tiny to small vessel. No stenosis.  Angiogram summary:  Coronaries: Nonobstructive coronary artery disease  LVEDP: Normal end-diastolic pressure.    TTE 10.1.21  Left ventricular ejection fraction is measured approximately 50 to 55%  Structurally normal mitral valve  Mild mitral regurgitation  Structurally normal trileaflet aortic valve  Tricuspid valve is structurally normal  There is mild tricuspid regurgitation with RVSP estimated at 28 mmHg  The pulmonic valve was not well visualized  Normal size left atrium  Normal right atrial size  Normal right ventricular size with preserved RV function                                                                                                                                                                                                                                                                                                                                                                                                                                                                          Patient Active Problem List   Diagnosis    Coronary artery disease involving native coronary artery of native heart without angina pectoris    Primary hypertension    Tobacco user    Prostate CA    Anemia requiring transfusions    Esophagitis    Chronic hemorrhagic anemia    Iron deficiency anemia    Iron deficiency anemia due to chronic blood loss    Mixed hyperlipidemia    Olecranon bursitis of right elbow    BMI 34.0-34.9,adult    Chronic obstructive pulmonary disease    Restrictive lung disease    Muscle tension dysphonia    JOSE on CPAP    Hiatal hernia    Internal hemorrhoids without complication    Chronic idiopathic constipation    Type 2 diabetes mellitus without complication, without long-term current use of insulin     Past Surgical History:   Procedure Laterality Date    COLONOSCOPY  12/29/2016    COLONOSCOPY Left 10/12/2022    Procedure: COLONOSCOPY;  Surgeon: Meggan Lester MD;  Location: [facility] ENDOSCOPY;  Service: Gastroenterology;  Laterality: Left;     Social History     Socioeconomic History    Marital status: Single   Tobacco Use    Smoking status: Every Day     Current packs/day: 0.50     Average packs/day: 0.9 packs/day for 38.1 years (34.5 ttl pk-yrs)     Types: Cigarettes     Start date: 1987     Last attempt to quit: 9/1/2024     Passive exposure: Never    Smokeless tobacco: Never   Substance and Sexual Activity    Alcohol use: Not Currently    Drug use: Never    Sexual activity: Yes     Social Drivers of Health     Financial Resource Strain: Low Risk  (7/9/2024)    Overall Financial Resource Strain (CARDIA)     Difficulty of Paying Living Expenses: Not very hard   Food Insecurity: Food Insecurity Present (7/9/2024)    Hunger Vital Sign     Worried About Running Out of Food in the Last Year: Sometimes true     Ran Out of Food in the Last Year: Sometimes true    Transportation Needs: No Transportation Needs (7/9/2024)    TRANSPORTATION NEEDS     Transportation : No   Physical Activity: Inactive (7/9/2024)    Exercise Vital Sign     Days of Exercise per Week: 0 days     Minutes of Exercise per Session: 0 min   Stress: No Stress Concern Present (7/9/2024)    [employer] - Occupational Stress Questionnaire     Feeling of Stress : Only a little   Housing Stability: Unknown (7/9/2024)    Housing Stability Vital Sign     Unable to Pay for Housing in the Last Year: No     Homeless in the Last Year: No        Family History   Problem Relation Name Age of Onset    Hypertension Mother      Kidney failure Mother      Bone cancer Father      Throat cancer Paternal Grandfather           Current Outpatient Medications:     albuterol (PROVENTIL/VENTOLIN HFA) 90 mcg/actuation inhaler, Inhale 2 puffs into the lungs every 4 (four) hours as needed for Wheezing or Shortness of Breath., Disp: 18 g, Rfl: 2    alprostadiL (MUSE) 250 MCG pellet, Place 250 mcg into the urethra as needed., Disp: , Rfl:     amLODIPine (NORVASC) 5 MG tablet, Take 1 tablet (5 mg total) by mouth once daily., Disp: 90 tablet, Rfl: 3    aspirin (ECOTRIN) 81 MG EC tablet, Take 1 tablet (81 mg total) by mouth once daily., Disp: 90 tablet, Rfl: 3    atorvastatin (LIPITOR) 40 MG tablet, Take 1 tablet (40 mg total) by mouth once daily., Disp: 90 tablet, Rfl: 3    blood-glucose meter kit, 1 each by Other route 3 (three) times daily before meals., Disp: , Rfl:     enzalutamide (XTANDI) 40 mg Tab, Take 4 tablets (160 mg total) by mouth once daily., Disp: 120 tablet, Rfl: 11    ergocalciferol (ERGOCALCIFEROL) 50,000 unit Cap, Take 50,000 Int'l Units by mouth., Disp: , Rfl:     ferrous sulfate (FEOSOL) 325 mg (65 mg iron) Tab tablet, Take 1 tablet (325 mg total) by mouth daily with breakfast., Disp: 30 tablet, Rfl: 5    herbal drugs (FIBER DIET ORAL), Take by mouth., Disp: , Rfl:     insulin  "glargine-yfgn 100 unit/mL (3 mL) InPn, Inject 10 Units into the skin every evening., Disp: 9 mL, Rfl: 3    insulin lispro (HUMALOG KWIKPEN INSULIN) 100 unit/mL pen, Inject 20 Units into the skin 3 (three) times daily with meals., Disp: 54 mL, Rfl: 3    lancets Misc, 1 lancet  by skin prick route 3 (three) times daily before meals., Disp: , Rfl:     lisinopriL (PRINIVIL,ZESTRIL) 5 MG tablet, Take 1 tablet (5 mg total) by mouth once daily., Disp: 90 tablet, Rfl: 3    metFORMIN (GLUCOPHAGE) 1000 MG tablet, Take 1 tablet (1,000 mg total) by mouth 2 (two) times daily with meals., Disp: 180 tablet, Rfl: 3    metoprolol succinate (TOPROL-XL) 50 MG 24 hr tablet, Take 1 tablet (50 mg total) by mouth once daily., Disp: 90 tablet, Rfl: 3    nicotine (NICODERM CQ) 7 mg/24 hr, Place 1 patch onto the skin once daily., Disp: 30 patch, Rfl: 2    pantoprazole (PROTONIX) 40 MG tablet, Take 1 tablet (40 mg total) by mouth once daily., Disp: 30 tablet, Rfl: 11    pen needle, diabetic 31 gauge x 3/16" Ndle, Inject 1 each into the skin every evening., Disp: , Rfl:     polyethylene glycol (GLYCOLAX) 17 gram/dose powder, Take 17 g by mouth once daily., Disp: 510 g, Rfl: 5    tamsulosin (FLOMAX) 0.4 mg Cap, TAKE ONE CAPSULE BY MOUTH once a day, Disp: 90 capsule, Rfl: 3    FREESTYLE SENDY 3 SENSOR [place], Apply 1 each topically every 14 (fourteen) days. (Patient not taking: Reported on 2/18/2025), Disp: , Rfl:     mometasone-formoterol 50-5 mcg/actuation HFAA, Inhale 1 puff into the lungs every 8 (eight) hours as needed (wheezing SOB). (Patient not taking: Reported on 2/10/2025), Disp: 13 g, Rfl: 3    nicotine, polacrilex, (NICORETTE) 2 mg Gum, Take 1 each (2 mg total) by mouth as needed. (Patient not taking: Reported on 2/18/2025), Disp: 190 each, Rfl: 0     ROS:                                                                                                                                                                             Review of " "Systems   Constitutional: Negative.    Respiratory:  Positive for shortness of breath.    Cardiovascular:  Negative for chest pain and palpitations.   Gastrointestinal: Negative.    Musculoskeletal: Negative.    Skin: Negative.    Psychiatric/Behavioral: Negative.          Blood pressure 130/70, pulse 86, temperature 98.7 °F (37.1 °C), temperature source Oral, resp. rate 18, height 5' 8" (1.727 m), weight 99.5 kg (219 lb 6.4 oz), SpO2 100%.   PE:  Physical Exam  Constitutional:       Appearance: Normal appearance.   HENT:      Head: Normocephalic.   Eyes:      Extraocular Movements: Extraocular movements intact.   Cardiovascular:      Rate and Rhythm: Normal rate and regular rhythm.      Heart sounds: Murmur heard.   Pulmonary:      Effort: Pulmonary effort is normal.   Abdominal:      Palpations: Abdomen is soft.   Musculoskeletal:         General: Normal range of motion.      Cervical back: Neck supple.      Left lower leg: Edema present.   Skin:     General: Skin is warm and dry.   Neurological:      General: No focal deficit present.      Mental Status: He is alert and oriented to person, place, and time.   Psychiatric:         Mood and Affect: Mood normal.        ASSESSMENT/PLAN:  Nonobstructive Coronary Artery Disease  per [facility] 12.8.21  - Hx NSTEMI in Oct. 2021  - [facility] - nonobstructive CAD (20-30% prox/mid segment stenosis, OM1-50% ostial stenosis, and RCA-2 sequential lesions, both with 50% stenosis midvessel) (12.8.21)  - Lexiscan stress test- small area of mild inferior ischemia with reversibility (10.4.21)  - EF-63% per ECHO 10.9.24  - Denies any CP. Reports stable RICE  - Continue Aspirin, Atorvastatin 40 mg, metoprolol succinate 50 mg, and SL nitro prn  - Counseled on heart healthy diet, increased exercise, and smoking cessation    RICE - Stable   - EF-63%, moderate MR per ECHO   - EF- 60%, mod MR, mild TR per ECHO 7.18.23  - EF 60% per Echo 6.17.22  - Exertional dyspnea likely multifactorial given, " long-term smoking history.  Can consider PFTs.  Will defer to PCP    VHD  - EF-63%, moderate MR per ECHO 10.9.24  - EF-60%, moderate MR, mild TR per ECHO 7.18.23  - Moderate MR per Echo 6.17.22  - Will continue to monitor with serial Echocardiograms at least every 1-2 years    HTN   - BP at goal 130/70  - Continue amlodipine 5 mg and metoprolol succinate 50 mg  - Counseled on low sodium diet and exercise as tolerated     HLD  - LDL -81  - Continue Atorvastatin 40mg   - Counseled on low cholesterol/low fat diet and exercise as tolerated    DM II  - [id number]  - Management per PCP       Tobacco Use  - Report smokes 1/2 pack of cigarettes a day and is not ready to quit at this time.   - Counseled on the importance of smoking cessation    Prostate Cancer  - Follow up with Oncology as directed      EKG  Follow up in Cardiology Clinic in 6 months or sooner if needed   Follow up with PCP and Oncology as directed

## 2025-02-24 ENCOUNTER — OFFICE VISIT (OUTPATIENT)
Dept: CARDIOLOGY | Facility: CLINIC | Age: 60
End: 2025-02-24
Payer: MEDICAID

## 2025-02-24 ENCOUNTER — LAB VISIT (OUTPATIENT)
Dept: LAB | Facility: HOSPITAL | Age: 60
End: 2025-02-24
Attending: NURSE PRACTITIONER
Payer: MEDICAID

## 2025-02-24 VITALS
SYSTOLIC BLOOD PRESSURE: 130 MMHG | RESPIRATION RATE: 18 BRPM | DIASTOLIC BLOOD PRESSURE: 70 MMHG | BODY MASS INDEX: 33.25 KG/M2 | WEIGHT: 219.38 LBS | OXYGEN SATURATION: 100 % | TEMPERATURE: 99 F | HEART RATE: 86 BPM | HEIGHT: 68 IN

## 2025-02-24 DIAGNOSIS — E78.2 MIXED HYPERLIPIDEMIA: ICD-10-CM

## 2025-02-24 DIAGNOSIS — Z72.0 TOBACCO USER: ICD-10-CM

## 2025-02-24 DIAGNOSIS — I25.10 CORONARY ARTERY DISEASE INVOLVING NATIVE CORONARY ARTERY OF NATIVE HEART WITHOUT ANGINA PECTORIS: ICD-10-CM

## 2025-02-24 DIAGNOSIS — I10 PRIMARY HYPERTENSION: Primary | ICD-10-CM

## 2025-02-24 DIAGNOSIS — J42 CHRONIC BRONCHITIS, UNSPECIFIED CHRONIC BRONCHITIS TYPE: ICD-10-CM

## 2025-02-24 DIAGNOSIS — G47.33 OSA ON CPAP: ICD-10-CM

## 2025-02-24 LAB
ALBUMIN SERPL-MCNC: 3.6 G/DL (ref 3.5–5)
ALBUMIN/GLOB SERPL: 0.8 RATIO (ref 1.1–2)
ALP SERPL-CCNC: 149 UNIT/L (ref 40–150)
ALT SERPL-CCNC: 13 UNIT/L (ref 0–55)
ANION GAP SERPL CALC-SCNC: 6 MEQ/L
AST SERPL-CCNC: 12 UNIT/L (ref 5–34)
BILIRUB SERPL-MCNC: 0.5 MG/DL
BUN SERPL-MCNC: 15.8 MG/DL (ref 8.4–25.7)
CALCIUM SERPL-MCNC: 9.8 MG/DL (ref 8.4–10.2)
CHLORIDE SERPL-SCNC: 107 MMOL/L (ref 98–107)
CHOLEST SERPL-MCNC: 152 MG/DL
CHOLEST/HDLC SERPL: 3 {RATIO} (ref 0–5)
CO2 SERPL-SCNC: 26 MMOL/L (ref 22–29)
CREAT SERPL-MCNC: 1.03 MG/DL (ref 0.72–1.25)
CREAT/UREA NIT SERPL: 15
GFR SERPLBLD CREATININE-BSD FMLA CKD-EPI: >60 ML/MIN/1.73/M2
GLOBULIN SER-MCNC: 4.6 GM/DL (ref 2.4–3.5)
GLUCOSE SERPL-MCNC: 200 MG/DL (ref 74–100)
HDLC SERPL-MCNC: 47 MG/DL (ref 35–60)
LDLC SERPL CALC-MCNC: 81 MG/DL (ref 50–140)
OHS QRS DURATION: 78 MS
OHS QTC CALCULATION: 423 MS
POTASSIUM SERPL-SCNC: 4.2 MMOL/L (ref 3.5–5.1)
PROT SERPL-MCNC: 8.2 GM/DL (ref 6.4–8.3)
SODIUM SERPL-SCNC: 139 MMOL/L (ref 136–145)
TRIGL SERPL-MCNC: 119 MG/DL (ref 34–140)
VLDLC SERPL CALC-MCNC: 24 MG/DL

## 2025-02-24 PROCEDURE — 93005 ELECTROCARDIOGRAM TRACING: CPT

## 2025-02-24 PROCEDURE — 1159F MED LIST DOCD IN RCRD: CPT | Mod: CPTII,,, | Performed by: NURSE PRACTITIONER

## 2025-02-24 PROCEDURE — 99215 OFFICE O/P EST HI 40 MIN: CPT | Mod: PBBFAC,25 | Performed by: NURSE PRACTITIONER

## 2025-02-24 PROCEDURE — 80061 LIPID PANEL: CPT

## 2025-02-24 PROCEDURE — 99214 OFFICE O/P EST MOD 30 MIN: CPT | Mod: S$PBB,,, | Performed by: NURSE PRACTITIONER

## 2025-02-24 PROCEDURE — 3008F BODY MASS INDEX DOCD: CPT | Mod: CPTII,,, | Performed by: NURSE PRACTITIONER

## 2025-02-24 PROCEDURE — 1160F RVW MEDS BY RX/DR IN RCRD: CPT | Mod: CPTII,,, | Performed by: NURSE PRACTITIONER

## 2025-02-24 PROCEDURE — 3078F DIAST BP <80 MM HG: CPT | Mod: CPTII,,, | Performed by: NURSE PRACTITIONER

## 2025-02-24 PROCEDURE — 4010F ACE/ARB THERAPY RXD/TAKEN: CPT | Mod: CPTII,,, | Performed by: NURSE PRACTITIONER

## 2025-02-24 PROCEDURE — 80053 COMPREHEN METABOLIC PANEL: CPT

## 2025-02-24 PROCEDURE — 36415 COLL VENOUS BLD VENIPUNCTURE: CPT

## 2025-02-24 PROCEDURE — 3075F SYST BP GE 130 - 139MM HG: CPT | Mod: CPTII,,, | Performed by: NURSE PRACTITIONER

## 2025-02-26 ENCOUNTER — ANESTHESIA EVENT (OUTPATIENT)
Dept: ENDOSCOPY | Facility: HOSPITAL | Age: 60
End: 2025-02-26
Payer: MEDICAID

## 2025-02-26 ENCOUNTER — HOSPITAL ENCOUNTER (OUTPATIENT)
Facility: HOSPITAL | Age: 60
Discharge: HOME | End: 2025-02-26
Attending: INTERNAL MEDICINE | Admitting: INTERNAL MEDICINE
Payer: MEDICAID

## 2025-02-26 ENCOUNTER — ANESTHESIA (OUTPATIENT)
Dept: ENDOSCOPY | Facility: HOSPITAL | Age: 60
End: 2025-02-26
Payer: MEDICAID

## 2025-02-26 DIAGNOSIS — K44.9 HIATAL HERNIA: ICD-10-CM

## 2025-02-26 DIAGNOSIS — D50.0 IRON DEFICIENCY ANEMIA DUE TO CHRONIC BLOOD LOSS: ICD-10-CM

## 2025-02-26 LAB
GLUCOSE SERPL-MCNC: 189 MG/DL (ref 70–110)
POCT GLUCOSE: 189 MG/DL (ref 70–110)

## 2025-02-26 PROCEDURE — 63600175 PHARM REV CODE 636 W HCPCS: Performed by: NURSE ANESTHETIST, CERTIFIED REGISTERED

## 2025-02-26 PROCEDURE — 88305 TISSUE EXAM BY PATHOLOGIST: CPT | Mod: TC | Performed by: INTERNAL MEDICINE

## 2025-02-26 PROCEDURE — 43239 EGD BIOPSY SINGLE/MULTIPLE: CPT | Performed by: INTERNAL MEDICINE

## 2025-02-26 PROCEDURE — 82962 GLUCOSE BLOOD TEST: CPT | Performed by: INTERNAL MEDICINE

## 2025-02-26 PROCEDURE — 27201423 OPTIME MED/SURG SUP & DEVICES STERILE SUPPLY: Performed by: INTERNAL MEDICINE

## 2025-02-26 PROCEDURE — 25000003 PHARM REV CODE 250: Performed by: SPECIALIST

## 2025-02-26 PROCEDURE — 37000008 HC ANESTHESIA 1ST 15 MINUTES: Performed by: INTERNAL MEDICINE

## 2025-02-26 PROCEDURE — D9220A PRA ANESTHESIA: Mod: ,,, | Performed by: NURSE ANESTHETIST, CERTIFIED REGISTERED

## 2025-02-26 RX ORDER — LIDOCAINE HYDROCHLORIDE 10 MG/ML
1 INJECTION, SOLUTION EPIDURAL; INFILTRATION; INTRACAUDAL; PERINEURAL ONCE
OUTPATIENT
Start: 2025-02-26 | End: 2025-02-26

## 2025-02-26 RX ORDER — LIDOCAINE HYDROCHLORIDE 20 MG/ML
INJECTION INTRAVENOUS
Status: DISCONTINUED | OUTPATIENT
Start: 2025-02-26 | End: 2025-02-26

## 2025-02-26 RX ORDER — METOPROLOL SUCCINATE 50 MG/1
50 TABLET, EXTENDED RELEASE ORAL ONCE
Status: COMPLETED | OUTPATIENT
Start: 2025-02-26 | End: 2025-02-26

## 2025-02-26 RX ORDER — PROPOFOL 10 MG/ML
INJECTION, EMULSION INTRAVENOUS
Status: DISCONTINUED | OUTPATIENT
Start: 2025-02-26 | End: 2025-02-26

## 2025-02-26 RX ADMIN — METOPROLOL SUCCINATE 50 MG: 50 TABLET, EXTENDED RELEASE ORAL at 12:02

## 2025-02-26 RX ADMIN — LIDOCAINE HYDROCHLORIDE 50 MG: 20 INJECTION INTRAVENOUS at 01:02

## 2025-02-26 RX ADMIN — PROPOFOL 120 MCG/KG/MIN: 10 INJECTION, EMULSION INTRAVENOUS at 01:02

## 2025-02-26 NOTE — [employer]ATION PATIENT INSTRUCTIONS
Discharge Summary/Instructions after an Endoscopic Procedure  Patient Name: Burton Vasquez  Patient MRN: [id number]  Patient YOB: 1965  Wednesday, February 26, 2025  Meggan Lester MD  Dear patient,  As a result of recent federal legislation (The Federal Cures Act), you may   receive lab or pathology results from your procedure in your [facility]   account before your physician is able to contact you. Your physician or   their representative will relay the results to you with their   recommendations at their soonest availability.  Thank you,  RESTRICTIONS:  During your procedure today, you received medications for sedation.  These   medications may affect your judgment, balance and coordination.  Therefore,   for 24 hours, you have the following restrictions:   - DO NOT drive a car, operate machinery, make legal/financial decisions,   sign important papers or drink alcohol.    ACTIVITY:  Today: no heavy lifting, straining or running due to procedural   sedation/anesthesia.  The following day: return to full activity including work.  DIET:  Eat and drink normally unless instructed otherwise.     TREATMENT FOR COMMON SIDE EFFECTS:  - Mild abdominal pain, nausea, belching, bloating or excessive gas:  rest,   eat lightly and use a heating pad.  - Sore Throat: treat with throat lozenges and/or gargle with warm salt   water.  - Because air was used during the procedure, expelling large amounts of air   from your rectum or belching is normal.  - If a bowel prep was taken, you may not have a bowel movement for 1-3 days.    This is normal.  SYMPTOMS TO WATCH FOR AND REPORT TO YOUR PHYSICIAN:  1. Abdominal pain or bloating, other than gas cramps.  2. Chest pain.  3. Back pain.  4. Signs of infection such as: chills or fever occurring within 24 hours   after the procedure.  5. Rectal bleeding, which would show as bright red, maroon, or black stools.   (A tablespoon of blood from the rectum is not serious,  especially if   hemorrhoids are present.)  6. Vomiting.  7. Weakness or dizziness.  GO DIRECTLY TO THE NEAREST EMERGENCY ROOM IF YOU HAVE ANY OF THE FOLLOWING:      Difficulty breathing              Chills and/or fever over 101 F   Persistent vomiting and/or vomiting blood   Severe abdominal pain   Severe chest pain   Black, tarry stools   Bleeding- more than one tablespoon   Any other symptom or condition that you feel may need urgent attention  Your doctor recommends these additional instructions:  If any biopsies were taken, your doctors clinic will contact you in 1 to 2   weeks with any results.  Recommendations:  - Patient has a contact number available for emergencies.  The signs and   symptoms of potential delayed complications were discussed with the   patient.  Return to normal activities tomorrow.  Written discharge   instructions were provided to the patient.   - Discharge patient to home.   - Resume previous diet.   - Continue present medications.   - Await pathology results.   - Take iron supplement every day or IV iron infusions   - Repeat colonoscopy not needed.  Can pursue VCE if does not respond to iron   replacement (has not been on it consistently).  Hiatal hernia could be   playing a role.  Impressions:  - 3 cm hiatal hernia.   - Normal stomach.   - Normal examined duodenum.  Biopsied.  For questions, problems or results please call your physician - Meggan Lester MD at Work:  [phone], Work:  [phone].  [facility] , EMERGENCY ROOM PHONE NUMBER: [phone]  IF A COMPLICATION OR EMERGENCY SITUATION ARISES AND YOU ARE UNABLE TO REACH   YOUR PHYSICIAN - GO DIRECTLY TO THE EMERGENCY ROOM.  Meggan Lester MD  2/26/2025 2:26:46 PM  This report has been verified and signed electronically.  Dear patient,  As a result of recent federal legislation (The Federal Cures Act), you may   receive lab or pathology results from your procedure in your [facility]    account before your physician is able to contact you. Your physician or   their representative will relay the results to you with their   recommendations at their soonest availability.  Thank you,  [employer]ATION

## 2025-02-26 NOTE — PLAN OF CARE
Pt back to baseline. A&O X4. Follows commands. Tolerating oral fluids.performs self care and dressed self. Pt and family educated on post op care. Discharge instructions handout given to patient. All questions and concerns were addressed. Dr. Lester at the bedside

## 2025-02-26 NOTE — ANESTHESIA PREPROCEDURE EVALUATION
"                                                                                                             02/26/2025  Burton Vasquez is a 59 y.o., male for EGD    There were no vitals filed for this visit.    Vitals:    02/26/25 1245   BP: 121/76   Pulse: 90   Resp: 18   Temp: 36.1 °C (97 °F)   TempSrc: Oral   SpO2: 97%   Weight: 99.8 kg (220 lb)   Height: 5' 8" (1.727 m)            59 y.o. male with a past medical history of Hx NSTEMI/Nonobstructive CAD per [facility] Dec. 2021, VHD, Hx CVA, DM II, HTN, HLD, gastritis, esophagitis, prostate cancer status post radiation, chemotherapy, and Tobacco Abuse     LD BB @ 1240 DOS     Active Ambulatory Problems     Diagnosis Date Noted    Coronary artery disease involving native coronary artery of native heart without angina pectoris 06/07/2022    Primary hypertension 06/07/2022    Tobacco user 06/07/2022    Prostate CA 07/14/2022    Anemia requiring transfusions 10/09/2022    Esophagitis 10/31/2022    Chronic hemorrhagic anemia 12/04/2022    Iron deficiency anemia 12/04/2022    Iron deficiency anemia due to chronic blood loss 12/05/2022    Mixed hyperlipidemia 12/06/2022    Olecranon bursitis of right elbow 03/21/2023    BMI 34.0-34.9,adult 03/21/2023    Chronic obstructive pulmonary disease 07/18/2023    Restrictive lung disease 09/07/2023    Muscle tension dysphonia 04/25/2024    JOSE on CPAP 07/09/2024    Hiatal hernia 08/16/2024    Internal hemorrhoids without complication 08/16/2024    Chronic idiopathic constipation 08/16/2024    Type 2 diabetes mellitus without complication, without long-term current use of insulin 02/18/2025     Resolved Ambulatory Problems     Diagnosis Date Noted    RICE (dyspnea on exertion) 06/07/2022     Past Medical History:   Diagnosis Date    Hypertension     Prostate cancer     Stroke        Past Surgical History:   Procedure Laterality Date    COLONOSCOPY  12/29/2016    COLONOSCOPY Left 10/12/2022    Procedure: " COLONOSCOPY;  Surgeon: Meggan Lester MD;  Location: [facility] ENDOSCOPY;  Service: Gastroenterology;  Laterality: Left;     Echocardiogram obtained on 10.9.24 revealed preserved EF of 63% and moderate MR which is stable from previous Echo July 2023.  Previous ischemic evaluation includes a Left Heart Catheterization in December 2021 in the setting of NSTEMI that revealed nonobstructive CAD     Lab Results   Component Value Date    WBC 5.92 08/16/2024    HGB 8.6 (A) 02/18/2025    HCT 37.3 (L) 08/16/2024     08/16/2024    CHOL 152 02/24/2025    TRIG 119 02/24/2025    HDL 47 02/24/2025    ALT 13 02/24/2025    AST 12 02/24/2025     02/24/2025    K 4.2 02/24/2025     02/24/2025    CREATININE 1.03 02/24/2025    BUN 15.8 02/24/2025    CO2 26 02/24/2025    TSH 0.4870 10/01/2021    PSA <0.10 01/08/2025    INR 1.01 12/07/2021    HGBA1C 11.7 (H) 09/16/2024       Medications Ordered Prior to Encounter[1]        Pre-op Assessment    I have reviewed the Patient Summary Reports.     I have reviewed the Nursing Notes. I have reviewed the NPO Status.   I have reviewed the Medications.     Review of Systems  Anesthesia Hx:  No problems with previous Anesthesia   History of prior surgery of interest to airway management or planning:          Denies Family Hx of Anesthesia complications.    Denies Personal Hx of Anesthesia complications.                    Hematology/Oncology:  Hematology Normal   Oncology Normal                                   EENT/Dental:  EENT/Dental Normal           Cardiovascular:  Cardiovascular Normal                                              Pulmonary:  Pulmonary Normal                       Renal/:  Renal/ Normal                 Hepatic/GI:  Hepatic/GI Normal                    Musculoskeletal:  Musculoskeletal Normal                Neurological:  Neurology Normal                                      Endocrine:  Endocrine Normal            Dermatological:  Skin Normal     Psych:  Psychiatric Normal                  Physical Exam  General: Well nourished, Cooperative, Alert and Oriented    Airway:  Mallampati: I / I  Mouth Opening: Normal  TM Distance: Normal  Tongue: Normal  Neck ROM: Normal ROM    Dental:  Intact      Anesthesia Plan  Type of Anesthesia, risks & benefits discussed:    Anesthesia Type: Gen Natural Airway  Intra-op Monitoring Plan: Standard ASA Monitors  Post Op Pain Control Plan: IV/PO Opioids PRN  (medical reason for not using multimodal pain management)  Induction:  IV  Informed Consent: Informed consent signed with the Patient and all parties understand the risks and agree with anesthesia plan.  All questions answered. Patient consented to blood products? No  ASA Score: 3  Day of Surgery Review of History & Physical: H&P Update referred to the surgeon/provider.    Ready For Surgery From Anesthesia Perspective.   .             [1]  No current facility-administered medications on file prior to encounter.     Current Outpatient Medications on File Prior to Encounter   Medication Sig Dispense Refill    aspirin (ECOTRIN) 81 MG EC tablet Take 1 tablet (81 mg total) by mouth once daily. 90 tablet 3    atorvastatin (LIPITOR) 40 MG tablet Take 1 tablet (40 mg total) by mouth once daily. 90 tablet 3    ergocalciferol (ERGOCALCIFEROL) 50,000 unit Cap Take 50,000 Int'l Units by mouth.      metoprolol succinate (TOPROL-XL) 50 MG 24 hr tablet Take 1 tablet (50 mg total) by mouth once daily. 90 tablet 3    pantoprazole (PROTONIX) 40 MG tablet Take 1 tablet (40 mg total) by mouth once daily. 30 tablet 11    alprostadiL (MUSE) 250 MCG pellet Place 250 mcg into the urethra as needed.      herbal drugs (FIBER DIET ORAL) Take by mouth.      mometasone-formoterol 50-5 mcg/actuation HFAA Inhale 1 puff into the lungs every 8 (eight) hours as needed (wheezing SOB). (Patient not taking: Reported on 2/10/2025) 13 g 3    nicotine, polacrilex, (NICORETTE) 2 mg Gum Take 1 each (2  mg total) by mouth as needed. (Patient not taking: Reported on 2/18/2025) 190 each 0    polyethylene glycol (GLYCOLAX) 17 gram/dose powder Take 17 g by mouth once daily. 510 g 5

## 2025-02-26 NOTE — H&P
EGD History and Physical    Patient Name: Burton Vasquez  MRN: [id number]  : 1965  Date of Procedure:  2025  Referring Physician: Magnolia Alexander FNP  Primary Physician: Carolina Zimmer MD  Procedure Physician: Meggan Lester MD, MPH     Procedure - EGD  ASA - per anesthesia  Mallampati - per anesthesia  History of Anesthesia problems - no  Family history Anesthesia problems -  no   Plan of anesthesia - General    Diagnosis: anemia, GERD  Chief Complaint: Same as above    HPI: Patient is an 59 y.o. male is here for the above.     Mr. Vasquez is a 59-year-old  male with CAD,  prostate cancer, CVA, GERD with HH and tobacco use here for an EGD.       Hospitalized in 2022 for anemia.   EGD 2022:  3 cm hiatal hernia, LA grade C reflux esophagitis, esophageal ulcer with no bleeding and no stigmata of recent bleeding, HP negative gastritis, normal examined duodenum.  Benign esophageal biopsies  Colonoscopy 2022: normal TI, internal hemorrhoids,  recommended recall of 10 years.    He reports taking pantoprazole 40 mg daily and denies any heartburn, reflux, dysphagia.    He is taking oral iron supplementation approximately once weekly when he remembers.  He has 1-2 formed to soft stools without rectal bleeding or melena.   His appetite is good and his weight is stable.       He takes aspirin 81 mg daily.  He smokes 10 cigarettes daily and denies alcohol use.  He denies illicit drug use.  He denies a family history of IBD, colon polyps, or colon cancer.      ROS:  Constitutional: No fevers, chills, No weight loss  CV: No chest pain  Pulm: No cough, No shortness of breath  GI: see HPI    Medical History:   Past Medical History:   Diagnosis Date    Hypertension     Prostate cancer     Stroke          Surgical History:   Past Surgical History:   Procedure Laterality Date    COLONOSCOPY  2016    COLONOSCOPY Left 10/12/2022    Procedure: COLONOSCOPY;  Surgeon: Meggan  SONY Lester MD;  Location: [facility] ENDOSCOPY;  Service: Gastroenterology;  Laterality: Left;       Family History:   Family History   Problem Relation Name Age of Onset    Hypertension Mother      Kidney failure Mother      Bone cancer Father      Throat cancer Paternal Grandfather     .    Social History:   Social History     Socioeconomic History    Marital status: Single   Tobacco Use    Smoking status: Every Day     Current packs/day: 0.50     Average packs/day: 0.9 packs/day for 38.1 years (34.5 ttl pk-yrs)     Types: Cigarettes     Start date: 1987     Last attempt to quit: 9/1/2024     Passive exposure: Never    Smokeless tobacco: Never   Substance and Sexual Activity    Alcohol use: Not Currently    Drug use: Never    Sexual activity: Yes     Social Drivers of Health     Financial Resource Strain: Low Risk  (7/9/2024)    Overall Financial Resource Strain (CARDIA)     Difficulty of Paying Living Expenses: Not very hard   Food Insecurity: Food Insecurity Present (7/9/2024)    Hunger Vital Sign     Worried About Running Out of Food in the Last Year: Sometimes true     Ran Out of Food in the Last Year: Sometimes true   Transportation Needs: No Transportation Needs (7/9/2024)    TRANSPORTATION NEEDS     Transportation : No   Physical Activity: Inactive (7/9/2024)    Exercise Vital Sign     Days of Exercise per Week: 0 days     Minutes of Exercise per Session: 0 min   Stress: No Stress Concern Present (7/9/2024)    [employer] - Occupational Stress Questionnaire     Feeling of Stress : Only a little   Housing Stability: Unknown (7/9/2024)    Housing Stability Vital Sign     Unable to Pay for Housing in the Last Year: No     Homeless in the Last Year: No       Review of patient's allergies indicates:  No Known Allergies    Medications:   Prescriptions Prior to Admission[1]      Physical Exam:    Vital Signs:   Vitals:    02/26/25 1249   BP: 121/76   Pulse: 90   Resp:    Temp:      BP  "121/76   Pulse 90   Temp 97 °F (36.1 °C) (Oral)   Resp 18   Ht 5' 8" (1.727 m)   Wt 99.8 kg (220 lb)   SpO2 97%   BMI 33.45 kg/m²     General:          Well appearing in no acute distress  Lungs: Clear to auscultation bilaterally, respirations unlabored  Heart: Regular rate and rhythm, S1 and S2 normal, no obvious murmurs  Abdomen:         Soft, non-tender, bowel sounds normal, no masses, no organomegaly        Labs:  Lab Results   Component Value Date    WBC 5.92 08/16/2024    HGB 8.6 (A) 02/18/2025    HCT 37.3 (L) 08/16/2024    MCV 73.9 (L) 08/16/2024     08/16/2024     Lab Results   Component Value Date    INR 1.01 12/07/2021     Lab Results   Component Value Date     02/24/2025    K 4.2 02/24/2025    CO2 26 02/24/2025    BUN 15.8 02/24/2025    CREATININE 1.03 02/24/2025    LABPROT 8.2 02/24/2025    ALBUMIN 3.6 02/24/2025    BILITOT 0.5 02/24/2025    ALKPHOS 149 02/24/2025    ALT 13 02/24/2025    AST 12 02/24/2025       Assessment and Plan:     History reviewed, vital signs satisfactory, cardiopulmonary status satisfactory.  I have explained the sedation options, risks, benefits, and alternatives of this endoscopic procedure to the patient including but not limited to bleeding, inflammation, infection, perforation, and death.  All questions were answered and the patient consented to proceed with procedure as planned.   The patient is deemed an appropriate candidate for the sedation as planned.      Meggan Lester MD, MPH   of Clinical Medicine  Gastroenterology and Hepatology  [facility]    2/26/2025  1:01 PM          [1]   Medications Prior to Admission   Medication Sig Dispense Refill Last Dose/Taking    albuterol (PROVENTIL/VENTOLIN HFA) 90 mcg/actuation inhaler Inhale 2 puffs into the lungs every 4 (four) hours as needed for Wheezing or Shortness of Breath. 18 g 2 2/25/2025    amLODIPine (NORVASC) 5 MG tablet Take 1 tablet (5 mg " total) by mouth once daily. 90 tablet 3 2/25/2025    aspirin (ECOTRIN) 81 MG EC tablet Take 1 tablet (81 mg total) by mouth once daily. 90 tablet 3 2/25/2025    atorvastatin (LIPITOR) 40 MG tablet Take 1 tablet (40 mg total) by mouth once daily. 90 tablet 3 2/25/2025    enzalutamide (XTANDI) 40 mg Tab Take 4 tablets (160 mg total) by mouth once daily. 120 tablet 11 Taking    ergocalciferol (ERGOCALCIFEROL) 50,000 unit Cap Take 50,000 Int'l Units by mouth.   Taking    ferrous sulfate (FEOSOL) 325 mg (65 mg iron) Tab tablet Take 1 tablet (325 mg total) by mouth daily with breakfast. 30 tablet 5 Past Month    insulin lispro (HUMALOG KWIKPEN INSULIN) 100 unit/mL pen Inject 20 Units into the skin 3 (three) times daily with meals. 54 mL 3 2/25/2025    lisinopriL (PRINIVIL,ZESTRIL) 5 MG tablet Take 1 tablet (5 mg total) by mouth once daily. 90 tablet 3 2/25/2025    metFORMIN (GLUCOPHAGE) 1000 MG tablet Take 1 tablet (1,000 mg total) by mouth 2 (two) times daily with meals. 180 tablet 3 2/25/2025    metoprolol succinate (TOPROL-XL) 50 MG 24 hr tablet Take 1 tablet (50 mg total) by mouth once daily. 90 tablet 3 Taking    nicotine (NICODERM CQ) 7 mg/24 hr Place 1 patch onto the skin once daily. 30 patch 2 2/25/2025    pantoprazole (PROTONIX) 40 MG tablet Take 1 tablet (40 mg total) by mouth once daily. 30 tablet 11 2/25/2025    polyethylene glycol (GLYCOLAX) 17 gram/dose powder Take 17 g by mouth once daily. 510 g 5 2/25/2025    tamsulosin (FLOMAX) 0.4 mg Cap TAKE ONE CAPSULE BY MOUTH once a day 90 capsule 3 2/25/2025    alprostadiL (MUSE) 250 MCG pellet Place 250 mcg into the urethra as needed.       blood-glucose meter kit 1 each by Other route 3 (three) times daily before meals.       FREESTYLE SENDY 3 SENSOR [place] Apply 1 each topically every 14 (fourteen) days. (Patient not taking: Reported on 2/18/2025)       herbal drugs (FIBER DIET ORAL) Take by mouth.       insulin glargine-yfgn 100 unit/mL (3 mL) InPn Inject 10 Units  "into the skin every evening. 9 mL 3     lancets Misc 1 lancet  by skin prick route 3 (three) times daily before meals.       mometasone-formoterol 50-5 mcg/actuation HFAA Inhale 1 puff into the lungs every 8 (eight) hours as needed (wheezing SOB). (Patient not taking: Reported on 2/10/2025) 13 g 3 Not Taking    nicotine, polacrilex, (NICORETTE) 2 mg Gum Take 1 each (2 mg total) by mouth as needed. (Patient not taking: Reported on 2/18/2025) 190 each 0     pen needle, diabetic 31 gauge x 3/16" Ndle Inject 1 each into the skin every evening.        "

## 2025-02-26 NOTE — ANESTHESIA POSTPROCEDURE EVALUATION
Anesthesia Post Evaluation    Patient: Burton Vasquez    Procedure(s) Performed: Procedure(s) (LRB):  EGD (ESOPHAGOGASTRODUODENOSCOPY) (N/A)    Final Anesthesia Type: general      Patient location during evaluation: GI PACU  Patient participation: Yes- Able to Participate  Level of consciousness: awake and alert  Airway patency: patent    PONV status at discharge: No PONV  Anesthetic complications: no      Cardiovascular status: blood pressure returned to baseline  Respiratory status: unassisted  Hydration status: euvolemic  Follow-up not needed.              Vitals Value Taken Time   /70 02/24/25 08:37   Temp 37.1 °C (98.7 °F) 02/24/25 08:37   Pulse 86 02/24/25 08:37   Resp 18 02/24/25 08:37   SpO2 100 % 02/24/25 08:37         No case tracking events are documented in the log.      Pain/Erik Score: No data recorded

## 2025-02-27 VITALS
HEART RATE: 81 BPM | SYSTOLIC BLOOD PRESSURE: 132 MMHG | OXYGEN SATURATION: 99 % | WEIGHT: 220 LBS | TEMPERATURE: 97 F | DIASTOLIC BLOOD PRESSURE: 90 MMHG | RESPIRATION RATE: 16 BRPM | BODY MASS INDEX: 33.34 KG/M2 | HEIGHT: 68 IN

## 2025-03-11 ENCOUNTER — RESULTS FOLLOW-UP (OUTPATIENT)
Dept: GASTROENTEROLOGY | Facility: CLINIC | Age: 60
End: 2025-03-11

## 2025-03-14 ENCOUNTER — LAB VISIT (OUTPATIENT)
Dept: LAB | Facility: HOSPITAL | Age: 60
End: 2025-03-14
Attending: NURSE PRACTITIONER
Payer: MEDICAID

## 2025-03-14 ENCOUNTER — RESULTS FOLLOW-UP (OUTPATIENT)
Dept: GASTROENTEROLOGY | Facility: CLINIC | Age: 60
End: 2025-03-14

## 2025-03-14 ENCOUNTER — OFFICE VISIT (OUTPATIENT)
Dept: GASTROENTEROLOGY | Facility: CLINIC | Age: 60
End: 2025-03-14
Payer: MEDICAID

## 2025-03-14 VITALS
HEIGHT: 68 IN | RESPIRATION RATE: 16 BRPM | DIASTOLIC BLOOD PRESSURE: 85 MMHG | TEMPERATURE: 99 F | HEART RATE: 88 BPM | OXYGEN SATURATION: 99 % | BODY MASS INDEX: 33.49 KG/M2 | SYSTOLIC BLOOD PRESSURE: 140 MMHG | WEIGHT: 221 LBS

## 2025-03-14 DIAGNOSIS — Z72.0 TOBACCO USER: ICD-10-CM

## 2025-03-14 DIAGNOSIS — K64.8 INTERNAL HEMORRHOIDS WITHOUT COMPLICATION: ICD-10-CM

## 2025-03-14 DIAGNOSIS — D50.9 IRON DEFICIENCY ANEMIA, UNSPECIFIED IRON DEFICIENCY ANEMIA TYPE: Primary | ICD-10-CM

## 2025-03-14 DIAGNOSIS — K59.04 CHRONIC IDIOPATHIC CONSTIPATION: ICD-10-CM

## 2025-03-14 DIAGNOSIS — D50.9 IRON DEFICIENCY ANEMIA, UNSPECIFIED IRON DEFICIENCY ANEMIA TYPE: ICD-10-CM

## 2025-03-14 DIAGNOSIS — K44.9 HIATAL HERNIA: ICD-10-CM

## 2025-03-14 LAB
BASOPHILS # BLD AUTO: 0.04 X10(3)/MCL
BASOPHILS NFR BLD AUTO: 0.6 %
EOSINOPHIL # BLD AUTO: 0.24 X10(3)/MCL (ref 0–0.9)
EOSINOPHIL NFR BLD AUTO: 3.6 %
ERYTHROCYTE [DISTWIDTH] IN BLOOD BY AUTOMATED COUNT: 17.5 % (ref 11.5–17)
FERRITIN SERPL-MCNC: 20.2 NG/ML (ref 21.81–274.66)
HCT VFR BLD AUTO: 35.2 % (ref 42–52)
HGB BLD-MCNC: 10.8 G/DL (ref 14–18)
IMM GRANULOCYTES # BLD AUTO: 0.01 X10(3)/MCL (ref 0–0.04)
IMM GRANULOCYTES NFR BLD AUTO: 0.2 %
IRON SATN MFR SERPL: 10 % (ref 20–50)
IRON SERPL-MCNC: 34 UG/DL (ref 65–175)
LYMPHOCYTES # BLD AUTO: 2.84 X10(3)/MCL (ref 0.6–4.6)
LYMPHOCYTES NFR BLD AUTO: 42.8 %
MCH RBC QN AUTO: 22.1 PG (ref 27–31)
MCHC RBC AUTO-ENTMCNC: 30.7 G/DL (ref 33–36)
MCV RBC AUTO: 72.1 FL (ref 80–94)
MONOCYTES # BLD AUTO: 0.41 X10(3)/MCL (ref 0.1–1.3)
MONOCYTES NFR BLD AUTO: 6.2 %
NEUTROPHILS # BLD AUTO: 3.1 X10(3)/MCL (ref 2.1–9.2)
NEUTROPHILS NFR BLD AUTO: 46.6 %
NRBC BLD AUTO-RTO: 0 %
PLATELET # BLD AUTO: 198 X10(3)/MCL (ref 130–400)
PMV BLD AUTO: 10 FL (ref 7.4–10.4)
RBC # BLD AUTO: 4.88 X10(6)/MCL (ref 4.7–6.1)
TIBC SERPL-MCNC: 314 UG/DL (ref 60–240)
TIBC SERPL-MCNC: 348 UG/DL (ref 250–450)
TRANSFERRIN SERPL-MCNC: 319 MG/DL (ref 174–364)
WBC # BLD AUTO: 6.64 X10(3)/MCL (ref 4.5–11.5)

## 2025-03-14 PROCEDURE — 83540 ASSAY OF IRON: CPT

## 2025-03-14 PROCEDURE — 85025 COMPLETE CBC W/AUTO DIFF WBC: CPT

## 2025-03-14 PROCEDURE — 99215 OFFICE O/P EST HI 40 MIN: CPT | Mod: PBBFAC | Performed by: NURSE PRACTITIONER

## 2025-03-14 PROCEDURE — 36415 COLL VENOUS BLD VENIPUNCTURE: CPT

## 2025-03-14 PROCEDURE — 82728 ASSAY OF FERRITIN: CPT

## 2025-03-14 RX ORDER — INSULIN GLARGINE 100 [IU]/ML
INJECTION, SOLUTION SUBCUTANEOUS
COMMUNITY
Start: 2025-02-20

## 2025-03-14 RX ORDER — BLOOD-GLUCOSE,RECEIVER,CONT
EACH MISCELLANEOUS
COMMUNITY
Start: 2024-09-26

## 2025-03-14 RX ORDER — SODIUM FERRIC GLUCONATE COMPLEX IN SUCROSE 12.5 MG/ML
125 INJECTION INTRAVENOUS
OUTPATIENT
Start: 2025-03-14

## 2025-03-14 RX ORDER — HEPARIN 100 UNIT/ML
500 SYRINGE INTRAVENOUS
OUTPATIENT
Start: 2025-03-14

## 2025-03-14 RX ORDER — PEN NEEDLE, DIABETIC 32GX 5/32"
NEEDLE, DISPOSABLE MISCELLANEOUS
COMMUNITY
Start: 2025-02-20

## 2025-03-14 RX ORDER — EPINEPHRINE 0.3 MG/.3ML
0.3 INJECTION SUBCUTANEOUS ONCE AS NEEDED
OUTPATIENT
Start: 2025-03-14

## 2025-03-14 RX ORDER — DIPHENHYDRAMINE HYDROCHLORIDE 50 MG/ML
50 INJECTION, SOLUTION INTRAMUSCULAR; INTRAVENOUS ONCE AS NEEDED
OUTPATIENT
Start: 2025-03-14

## 2025-03-14 RX ORDER — SODIUM CHLORIDE 0.9 % (FLUSH) 0.9 %
10 SYRINGE (ML) INJECTION
OUTPATIENT
Start: 2025-03-14

## 2025-03-14 NOTE — PROGRESS NOTES
Please let patient know that he remains iron-deficient and that hemoglobin and hematocrit have dropped some from 7 months ago.  He did recently restart oral iron supplementation daily approximately 1 week ago.  I do have repeat labs ordered, CBC, iron profile, and ferritin in 3 months as well.  I have ordered video capsule endoscopy the further evaluate anemia and have placed orders for IV iron infusions.  Please provide ER precautions in the interim.  Thanks

## 2025-03-14 NOTE — ASSESSMENT & PLAN NOTE
Hospitalized at [facility] October 9, 2022 to November 7, 2022.    He was admitted to hospital medicine and received 2 units PRBCs secondary to anemia with improvement of hemoglobin and hematocrit; he was iron deficient at that time.    He underwent EGD October 12, 2022 with findings of 3 cm hiatal hernia, LA grade C reflux esophagitis, esophageal ulcer with no bleeding and no stigmata of recent bleeding, gastritis, normal examined duodenum.  Pathology revealed mild active chronic gastritis negative for Helicobacter pylori organisms and no dysplasia identified, acute esophagitis with ulceration and no viral cytopathic effect or dysplasia identified, PAS F stain negative for fungal elements.    He underwent colonoscopy October 12, 2022 with findings of examined portion of ileum normal, internal hemorrhoids, no specimens collected with a recommended recall of 10 years.  He underwent EGD February 26, 2025 with findings of 3 cm hiatal hernia. Normal stomach. Normal examined duodenum. Biopsied. Duodenal biopsies unremarkable.   He was recommended consideration of VCE with persistent or worsening anemia despite oral iron therapy and recommended to take oral iron therapy once daily versus IV iron infusions.  GERD lifestyle modifications  Reflux precautions  Limit NSAID use  Continue oral iron supplementation daily and pantoprazole 40 mg daily  Recommend tobacco cessation  CBC, iron profile, ferritin today and in 3 months   Call with updates  ER precautions provided  Follow-up clinic visit with NP in 6 months

## 2025-03-14 NOTE — PROGRESS NOTES
Subjective:       Patient ID: Burton Vasquez is a 59 y.o. male.    Chief Complaint: Anemia (No complaints. Taking his PO iron Daily.)    This 59-year-old  male with CAD, hypertension, prostate cancer, CVA, and tobacco use presents unaccompanied for a follow-up visit.  He was referred for anemia and seen August 16, 2024.  He reported taking pantoprazole 40 mg daily and oral iron supplementation approximately once weekly when he remembered.  He was unsure if oral iron was worsening constipation.  He had occasional constipation where he would go a few days without a bowel movement with occasional straining and did not always feel completely evacuated.  Some days, he would have 1-2 formed to soft stools without any issues.  He denied taking anything for symptomatic relief.  His appetite was good and his weight was stable.  He denied nocturnal symptoms, fever, chills, nausea, vomiting, hematemesis, odynophagia, dysphagia, acid reflux, pyrosis, early satiety, abdominal pain, melena, hematochezia, fecal urgency, fecal incontinence, or pain with defecation.     He was hospitalized at [facility] October 9, 2022 to November 7, 2022.  He presented with shortness of breath and chest pain and reported excessive hematochezia October 3, 2022 and another episode of hematochezia with melena October 6, 2022.  Since October 6, 2022, he had felt short of breath at rest and worsened with exertion.  He was admitted to hospital medicine and received 2 units PRBCs secondary to anemia with improvement of hemoglobin and hematocrit.  He was iron deficient at that time.  GI was consulted.  He underwent EGD October 12, 2022 with findings of 3 cm hiatal hernia, LA grade C reflux esophagitis, esophageal ulcer with no bleeding and no stigmata of recent bleeding, gastritis, normal examined duodenum.  Pathology revealed mild active chronic gastritis negative for Helicobacter pylori organisms and no dysplasia identified, acute esophagitis  with ulceration and no viral cytopathic effect or dysplasia identified, PAS F stain negative for fungal elements.  He underwent colonoscopy October 12, 2022 with findings of examined portion of ileum normal, internal hemorrhoids, no specimens collected with a recommended recall of 10 years.    He underwent EGD February 26, 2025 with findings of 3 cm hiatal hernia. Normal stomach. Normal examined duodenum. Biopsied. Duodenal biopsies unremarkable. He was recommended consideration of VCE with persistent or worsening anemia despite oral iron therapy and recommended to take oral iron therapy once daily versus IV iron infusions.    Today, he presents for a follow-up visit.  He reports taking oral iron supplementation once daily over the past 1-2 weeks.  He takes pantoprazole 40 mg daily.  He reports feeling well and denies nocturnal symptoms, appetite changes, unintentional weight loss, fever, chills, nausea, vomiting, hematemesis, odynophagia, dysphagia, acid reflux, pyrosis, belching, bloating, early satiety, or abdominal pain.  Bowel habits are described as formed stools once daily without melena, hematochezia, fecal urgency, fecal incontinence, or pain with defecation.     He takes aspirin 81 mg daily.  He smokes 10 cigarettes daily and denies alcohol use.  He denies illicit drug use.  He denies a family history of IBD, colon polyps, or colon cancer.    Review of patient's allergies indicates:  No Known Allergies    Past Medical History:   Diagnosis Date    Hypertension     Prostate cancer     Stroke      Past Surgical History:   Procedure Laterality Date    COLONOSCOPY  12/29/2016    COLONOSCOPY Left 10/12/2022    Procedure: COLONOSCOPY;  Surgeon: Meggan Lester MD;  Location: [facility] ENDOSCOPY;  Service: Gastroenterology;  Laterality: Left;    EGD, WITH CLOSED BIOPSY N/A 2/26/2025    Procedure: EGD, WITH CLOSED BIOPSY;  Surgeon: Meggan Lester MD;  Location: [facility] ENDOSCOPY;  Service: Gastroenterology;   Laterality: N/A;     Family History:   family history includes Bone cancer in his father; Hypertension in his mother; Kidney failure in his mother; Throat cancer in his paternal grandfather.    Social History:    reports that he has been smoking cigarettes. He started smoking about 38 years ago. He has a 34.6 pack-year smoking history. He has never been exposed to tobacco smoke. He has never used smokeless tobacco. He reports that he does not currently use alcohol. He reports that he does not use drugs.    Review of Systems  Negative except as noted in the HPI.      Objective:      Physical Exam  Constitutional:       Appearance: Normal appearance.   HENT:      Head: Normocephalic.      Mouth/Throat:      Mouth: Mucous membranes are moist.   Eyes:      Extraocular Movements: Extraocular movements intact.      Conjunctiva/sclera: Conjunctivae normal.      Pupils: Pupils are equal, round, and reactive to light.   Cardiovascular:      Rate and Rhythm: Normal rate and regular rhythm.      Pulses: Normal pulses.      Heart sounds: Normal heart sounds.   Pulmonary:      Effort: Pulmonary effort is normal.      Breath sounds: Normal breath sounds.   Abdominal:      General: Bowel sounds are normal.      Palpations: Abdomen is soft.   Musculoskeletal:         General: Normal range of motion.      Cervical back: Normal range of motion and neck supple.   Skin:     General: Skin is warm and dry.   Neurological:      General: No focal deficit present.      Mental Status: He is alert and oriented to person, place, and time.   Psychiatric:         Mood and Affect: Mood normal.         Behavior: Behavior normal.         Thought Content: Thought content normal.         Judgment: Judgment normal.         Home Medications:     Current Outpatient Medications   Medication Sig    albuterol (PROVENTIL/VENTOLIN HFA) 90 mcg/actuation inhaler Inhale 2 puffs into the lungs every 4 (four) hours as needed for Wheezing or Shortness of Breath.  "   amLODIPine (NORVASC) 5 MG tablet Take 1 tablet (5 mg total) by mouth once daily.    aspirin (ECOTRIN) 81 MG EC tablet Take 1 tablet (81 mg total) by mouth once daily.    atorvastatin (LIPITOR) 40 MG tablet Take 1 tablet (40 mg total) by mouth once daily.    blood-glucose meter kit 1 each by Other route 3 (three) times daily before meals.    blood-glucose meter,continuous (FREESTYLE SENDY 3 READER) [facility] one meter for continuous BG testing for 365 days    EASY TOUCH 32 gauge x 5/32" Ndle use 4 TIMES A DAY    ergocalciferol (ERGOCALCIFEROL) 50,000 unit Cap Take 50,000 Int'l Units by mouth.    ferrous sulfate (FEOSOL) 325 mg (65 mg iron) Tab tablet Take 1 tablet (325 mg total) by mouth daily with breakfast.    herbal drugs (FIBER DIET ORAL) Take by mouth.    insulin glargine-yfgn 100 unit/mL (3 mL) InPn Inject 10 Units into the skin every evening.    insulin lispro (HUMALOG KWIKPEN INSULIN) 100 unit/mL pen Inject 20 Units into the skin 3 (three) times daily with meals.    lancets Misc 1 lancet  by skin prick route 3 (three) times daily before meals.    LANTUS SOLOSTAR U-100 INSULIN 100 unit/mL (3 mL) InPn pen SMARTSIG:10 Unit(s) SUB-Q Every Evening    lisinopriL (PRINIVIL,ZESTRIL) 5 MG tablet Take 1 tablet (5 mg total) by mouth once daily.    metFORMIN (GLUCOPHAGE) 1000 MG tablet Take 1 tablet (1,000 mg total) by mouth 2 (two) times daily with meals.    metoprolol succinate (TOPROL-XL) 50 MG 24 hr tablet Take 1 tablet (50 mg total) by mouth once daily.    nicotine (NICODERM CQ) 7 mg/24 hr Place 1 patch onto the skin once daily.    pantoprazole (PROTONIX) 40 MG tablet Take 1 tablet (40 mg total) by mouth once daily.    pen needle, diabetic 31 gauge x 3/16" Ndle Inject 1 each into the skin every evening.    polyethylene glycol (GLYCOLAX) 17 gram/dose powder Take 17 g by mouth once daily.    tamsulosin (FLOMAX) 0.4 mg Cap TAKE ONE CAPSULE BY MOUTH once a day    alprostadiL (MUSE) 250 MCG pellet Place 250 mcg into the " urethra as needed.    enzalutamide (XTANDI) 40 mg Tab Take 4 tablets (160 mg total) by mouth once daily. (Patient not taking: Reported on 3/14/2025)    FREESTYLE SENDY 3 SENSOR [place] Apply 1 each topically every 14 (fourteen) days. (Patient not taking: Reported on 2/18/2025)    mometasone-formoterol 50-5 mcg/actuation HFAA Inhale 1 puff into the lungs every 8 (eight) hours as needed (wheezing SOB). (Patient not taking: Reported on 3/14/2025)    nicotine, polacrilex, (NICORETTE) 2 mg Gum Take 1 each (2 mg total) by mouth as needed. (Patient not taking: Reported on 10/6/2023)     No current facility-administered medications for this visit.     Laboratory Results:     Recent Results (from the past 24 weeks)   POCT URINE DIPSTICK WITH MICROSCOPE, AUTOMATED    Collection Time: 10/03/24  9:25 AM   Result Value Ref Range    Color, UA Yellow     Spec Grav UA 1.025     pH, UA 5.5     WBC, UA neg     Nitrite, UA neg     Protein, POC neg     Glucose, UA neg     Ketones, UA neg     Urobilinogen, UA 0.2     Bilirubin, POC neg     Blood, UA small    Echo    Collection Time: 10/09/24  9:18 AM   Result Value Ref Range    BSA 2.2 m2    Benjamin's Biplane MOD Ejection Fraction 63 %    A2C EF 64 %    A4C EF 61 %    LVOT stroke volume 93.5 cm3    LVIDd 4.2 3.5 - 6.0 cm    LV Systolic Volume 22.46 mL    LV Systolic Volume Index 10.5 mL/m2    LVIDs 2.5 2.1 - 4.0 cm    LV ESV A2C 62.18 mL    LV Diastolic Volume 76.22 mL    LV ESV A4C 56.09 mL    LV Diastolic Volume Index 35.62 mL/m2    LV EDV A2C [id number] mL    LV EDV A4C 97.92 mL    Left Ventricular End Systolic Volume by Teichholz Method 22.46 mL    Left Ventricular End Diastolic Volume by Teichholz Method 76.22 mL    IVS 1.3 (A) 0.6 - 1.1 cm    LVOT diameter 2.2 cm    LVOT area 3.8 cm2    FS 40.5 28 - 44 %    Left Ventricle Relative Wall Thickness 0.57 cm    PW 1.2 (A) 0.6 - 1.1 cm    LV mass 189.2 g    LV Mass Index 88.4 g/m2    MV Peak E [place] 1.24 m/s    TDI LATERAL 0.11 m/s     MV Peak A [place] 1.07 m/s    TR Max [place] 1.98 m/s    E/A ratio 1.16     E wave deceleration time 89.46 msec    LV LATERAL E/E' RATIO 11.27 m/s    LVOT peak ventura 1.1 m/s    Left Ventricular Outflow Tract Mean Velocity 0.75 cm/s    Left Ventricular Outflow Tract Mean Gradient 2.45 mmHg    TAPSE 2.54 cm    LA size 4.88 cm    LA Vol (MOD) 60.01 mL    CARLA (MOD) 28.0 mL/m2    RA Major Axis 4.60 cm    AV mean gradient 5.1 mmHg    AV peak gradient 9.0 mmHg    Ao peak ventura 1.5 m/s    Ao VTI 31.1 cm    LVOT peak VTI 24.6 cm    AV valve area 3.0 cm²    AV Velocity Ratio 0.73     AV index (prosthetic) 0.79     ARIEL by Velocity Ratio 2.8 cm²    Mr max ventura 6.18 m/s    MV mean gradient 3 mmHg    MV peak gradient 6 mmHg    MV stenosis pressure 1/2 time 25.94 ms    MV valve area p 1/2 method 8.48 cm2    MV valve area by continuity eq 3.64 cm2    MV VTI 25.7 cm    Triscuspid Valve Regurgitation Peak Gradient 16 mmHg    ZLVIDS -4.09     ZLVIDD -4.94     LA area A4C 19.94 cm2    LA area A2C 20.60 cm2    TV resting pulmonary artery pressure 19 mmHg    RV TB RVSP 5 mmHg    Est. RA pres 3 mmHg    IVC diameter 1.8 cm   Creatinine, Serum    Collection Time: 01/02/25  7:15 AM   Result Value Ref Range    Creatinine 1.37 (H) 0.72 - 1.25 mg/dL    eGFR 60 mL/min/1.73/m2   POCT URINE DIPSTICK WITH MICROSCOPE, AUTOMATED    Collection Time: 01/08/25  9:59 AM   Result Value Ref Range    Color, UA Yellow     Spec Grav UA 1.025     pH, UA 5.5     WBC, UA neg     Nitrite, UA neg     Protein, POC neg     Glucose, UA neg     Ketones, UA neg     Urobilinogen, UA 0.2     Bilirubin, POC neg     Blood, UA small    PSA, Total (Diagnostic)    Collection Time: 01/08/25 11:15 AM   Result Value Ref Range    Prostate Specific Antigen <0.10 <=4.00 ng/mL   POCT Hemoglobin    Collection Time: 02/18/25  9:18 AM   Result Value Ref Range    Hemoglobin 8.6 (A) 14 - 18 g/dL   Comprehensive Metabolic Panel    Collection Time: 02/24/25  8:16 AM   Result Value Ref Range     Sodium 139 136 - 145 mmol/L    Potassium 4.2 3.5 - 5.1 mmol/L    Chloride 107 98 - 107 mmol/L    CO2 26 22 - 29 mmol/L    Glucose 200 (H) 74 - 100 mg/dL    Blood Urea Nitrogen 15.8 8.4 - 25.7 mg/dL    Creatinine 1.03 0.72 - 1.25 mg/dL    Calcium 9.8 8.4 - 10.2 mg/dL    Protein Total 8.2 6.4 - 8.3 gm/dL    Albumin 3.6 3.5 - 5.0 g/dL    Globulin 4.6 (H) 2.4 - 3.5 gm/dL    Albumin/Globulin Ratio 0.8 (L) 1.1 - 2.0 ratio    Bilirubin Total 0.5 <=1.5 mg/dL     40 - 150 unit/L    ALT 13 0 - 55 unit/L    AST 12 5 - 34 unit/L    eGFR >60 mL/min/1.73/m2    Anion Gap 6.0 mEq/L    BUN/Creatinine Ratio 15    Lipid Panel    Collection Time: 02/24/25  8:16 AM   Result Value Ref Range    Cholesterol Total 152 <=200 mg/dL    HDL Cholesterol 47 35 - 60 mg/dL    Triglyceride 119 34 - 140 mg/dL    Cholesterol/HDL Ratio 3 0 - 5    Very Low Density Lipoprotein 24     LDL Cholesterol 81.00 50.00 - 140.00 mg/dL   IN OFFICE EKG 12-LEAD (to [place])    Collection Time: 02/24/25  9:20 AM   Result Value Ref Range    QRS Duration 78 ms    [facility] QTC Calculation 423 ms   POCT glucose    Collection Time: 02/26/25 12:41 PM   Result Value Ref Range    POCT Glucose 189 (H) 70 - 110 mg/dL   POCT Glucose, Hand-Held Device    Collection Time: 02/26/25 12:44 PM   Result Value Ref Range    POC Glucose 189 (A) 70 - 110 MG/DL   Specimen to Pathology    Collection Time: 02/26/25  1:53 PM   Result Value Ref Range    Case Report       [facility] Surgical Pathology                            Case: [id number]                                 Authorizing Provider:  Meggan Lester MD   Collected:           02/26/2025 01:53 PM          Ordering Location:     [facility] -       Received:            02/27/2025 07:31 AM                                 Endoscopy                                                                    Pathologist:           Paola Das MD                                                        Specimen:    Small intestine,  "Duodenum, duodenal biopsy r/o celiac                                      Clinical Information       Procedure:  EGD, WITH CLOSED BIOPSY  Pre-op Diagnosis:  D50.0 - Iron deficiency anemia due to chronic blood loss [ICD-10-CM]  K44.9 - Hiatal hernia [ICD-10-CM]  Post-op Diagnosis:  D50.0 - Iron deficiency anemia due to chronic blood loss [ICD-10-CM]  K44.9 - Hiatal hernia [ICD-10-CM]      Final Diagnosis        Duodenum, biopsy:   - Mild Chronic duodenitis.  See comment         Comments         Note:  The duodenal mucosa shows mild chronic inflammation with no significant villous blunting or intraepithelial lymphocytosis.       Microscopic Description       A microscopic examination was performed and the diagnosis reflects the findings.          Gross Description       1. Small intestine, Duodenum: duodenal biopsy r/o celiac  Received in formalin are multiple fragments of tan soft tissue ranging from 1 to 3 mm. Entirely submitted in a single cassette.          Report Footnotes       Unless the case is a "gross only" or additional testing only, the final diagnosis for each specimen is based on a microscopic examination of appropriate tissue sections.       Assessment/Plan:     Problem List Items Addressed This Visit          Oncology    Iron deficiency anemia - Primary    Hospitalized at [facility] October 9, 2022 to November 7, 2022.    He was admitted to hospital medicine and received 2 units PRBCs secondary to anemia with improvement of hemoglobin and hematocrit; he was iron deficient at that time.    He underwent EGD October 12, 2022 with findings of 3 cm hiatal hernia, LA grade C reflux esophagitis, esophageal ulcer with no bleeding and no stigmata of recent bleeding, gastritis, normal examined duodenum.  Pathology revealed mild active chronic gastritis negative for Helicobacter pylori organisms and no dysplasia identified, acute esophagitis with ulceration and no viral cytopathic effect or dysplasia identified, PAS F " stain negative for fungal elements.    He underwent colonoscopy October 12, 2022 with findings of examined portion of ileum normal, internal hemorrhoids, no specimens collected with a recommended recall of 10 years.  He underwent EGD February 26, 2025 with findings of 3 cm hiatal hernia. Normal stomach. Normal examined duodenum. Biopsied. Duodenal biopsies unremarkable.   He was recommended consideration of VCE with persistent or worsening anemia despite oral iron therapy and recommended to take oral iron therapy once daily versus IV iron infusions.  GERD lifestyle modifications  Reflux precautions  Limit NSAID use  Continue oral iron supplementation daily and pantoprazole 40 mg daily  Recommend tobacco cessation  CBC, iron profile, ferritin today and in 3 months   Call with updates  ER precautions provided  Follow-up clinic visit with NP in 6 months         Relevant Orders    CBC Auto Differential    Ferritin    Iron and TIBC    CBC Auto Differential    Ferritin    Iron and TIBC       GI    Hiatal hernia    See above         Chronic idiopathic constipation    See above, controlled  Recommend soluble fiber supplementation  Avoid straining or sitting on the toilet for long periods of time  Okay to take MiraLax 17 g daily as needed         Internal hemorrhoids without complication    See above  He underwent colonoscopy October 12, 2022 with findings of examined portion of ileum normal, internal hemorrhoids, no specimens collected with a recommended recall of 10 years.  Recommend soluble fiber supplementation  Avoid straining or sitting on the toilet for long periods of time  Hemorrhoidal banding discussed            Other    Tobacco user    Recommend tobacco cessation.         Relevant Orders    Ambulatory referral/consult to Smoking Cessation Program        - - -

## 2025-03-14 NOTE — ASSESSMENT & PLAN NOTE
See above, controlled  Recommend soluble fiber supplementation  Avoid straining or sitting on the toilet for long periods of time  Okay to take MiraLax 17 g daily as needed

## 2025-03-14 NOTE — ASSESSMENT & PLAN NOTE
See above  He underwent colonoscopy October 12, 2022 with findings of examined portion of ileum normal, internal hemorrhoids, no specimens collected with a recommended recall of 10 years.  Recommend soluble fiber supplementation  Avoid straining or sitting on the toilet for long periods of time  Hemorrhoidal banding discussed

## 2025-03-31 ENCOUNTER — INFUSION (OUTPATIENT)
Dept: INFUSION THERAPY | Facility: HOSPITAL | Age: 60
End: 2025-03-31
Attending: INTERNAL MEDICINE
Payer: MEDICAID

## 2025-03-31 VITALS
HEART RATE: 92 BPM | SYSTOLIC BLOOD PRESSURE: 149 MMHG | RESPIRATION RATE: 18 BRPM | DIASTOLIC BLOOD PRESSURE: 87 MMHG | TEMPERATURE: 98 F

## 2025-03-31 DIAGNOSIS — D50.0 IRON DEFICIENCY ANEMIA DUE TO CHRONIC BLOOD LOSS: Primary | ICD-10-CM

## 2025-03-31 PROCEDURE — 63600175 PHARM REV CODE 636 W HCPCS: Performed by: NURSE PRACTITIONER

## 2025-03-31 PROCEDURE — 96374 THER/PROPH/DIAG INJ IV PUSH: CPT

## 2025-03-31 PROCEDURE — 25000003 PHARM REV CODE 250: Performed by: NURSE PRACTITIONER

## 2025-03-31 RX ORDER — SODIUM CHLORIDE 0.9 % (FLUSH) 0.9 %
10 SYRINGE (ML) INJECTION
OUTPATIENT
Start: 2025-04-07

## 2025-03-31 RX ORDER — EPINEPHRINE 0.3 MG/.3ML
0.3 INJECTION SUBCUTANEOUS ONCE AS NEEDED
OUTPATIENT
Start: 2025-04-07

## 2025-03-31 RX ORDER — SODIUM FERRIC GLUCONATE COMPLEX IN SUCROSE 12.5 MG/ML
125 INJECTION INTRAVENOUS
OUTPATIENT
Start: 2025-04-07

## 2025-03-31 RX ORDER — HEPARIN 100 UNIT/ML
500 SYRINGE INTRAVENOUS
OUTPATIENT
Start: 2025-04-07

## 2025-03-31 RX ORDER — DIPHENHYDRAMINE HYDROCHLORIDE 50 MG/ML
50 INJECTION, SOLUTION INTRAMUSCULAR; INTRAVENOUS ONCE AS NEEDED
OUTPATIENT
Start: 2025-04-07

## 2025-03-31 RX ORDER — SODIUM FERRIC GLUCONATE COMPLEX IN SUCROSE 12.5 MG/ML
125 INJECTION INTRAVENOUS
Status: COMPLETED | OUTPATIENT
Start: 2025-03-31 | End: 2025-03-31

## 2025-03-31 RX ORDER — SODIUM CHLORIDE 0.9 % (FLUSH) 0.9 %
10 SYRINGE (ML) INJECTION
Status: DISCONTINUED | OUTPATIENT
Start: 2025-03-31 | End: 2025-03-31 | Stop reason: HOSPADM

## 2025-03-31 RX ORDER — HEPARIN 100 UNIT/ML
500 SYRINGE INTRAVENOUS
Status: DISCONTINUED | OUTPATIENT
Start: 2025-03-31 | End: 2025-03-31 | Stop reason: HOSPADM

## 2025-03-31 RX ADMIN — SODIUM FERRIC GLUCONATE COMPLEX IN SUCROSE 125 MG: 12.5 INJECTION INTRAVENOUS at 02:03

## 2025-03-31 RX ADMIN — SODIUM CHLORIDE: 9 INJECTION, SOLUTION INTRAVENOUS at 02:03

## 2025-04-01 DIAGNOSIS — C61 PROSTATE CANCER: Primary | ICD-10-CM

## 2025-04-07 ENCOUNTER — LAB VISIT (OUTPATIENT)
Dept: LAB | Facility: HOSPITAL | Age: 60
End: 2025-04-07
Attending: UROLOGY
Payer: MEDICAID

## 2025-04-07 ENCOUNTER — INFUSION (OUTPATIENT)
Dept: INFUSION THERAPY | Facility: HOSPITAL | Age: 60
End: 2025-04-07
Attending: INTERNAL MEDICINE
Payer: MEDICAID

## 2025-04-07 VITALS
SYSTOLIC BLOOD PRESSURE: 142 MMHG | BODY MASS INDEX: 33.42 KG/M2 | OXYGEN SATURATION: 96 % | HEART RATE: 88 BPM | DIASTOLIC BLOOD PRESSURE: 92 MMHG | WEIGHT: 220.5 LBS | RESPIRATION RATE: 20 BRPM | HEIGHT: 68 IN | TEMPERATURE: 98 F

## 2025-04-07 DIAGNOSIS — C61 PROSTATE CANCER: ICD-10-CM

## 2025-04-07 DIAGNOSIS — D50.0 IRON DEFICIENCY ANEMIA DUE TO CHRONIC BLOOD LOSS: Primary | ICD-10-CM

## 2025-04-07 LAB — PSA SERPL-MCNC: <0.1 NG/ML

## 2025-04-07 PROCEDURE — A4216 STERILE WATER/SALINE, 10 ML: HCPCS | Performed by: NURSE PRACTITIONER

## 2025-04-07 PROCEDURE — 63600175 PHARM REV CODE 636 W HCPCS: Performed by: NURSE PRACTITIONER

## 2025-04-07 PROCEDURE — 25000003 PHARM REV CODE 250: Performed by: NURSE PRACTITIONER

## 2025-04-07 PROCEDURE — 36415 COLL VENOUS BLD VENIPUNCTURE: CPT

## 2025-04-07 PROCEDURE — 96374 THER/PROPH/DIAG INJ IV PUSH: CPT

## 2025-04-07 PROCEDURE — 84153 ASSAY OF PSA TOTAL: CPT

## 2025-04-07 RX ORDER — SODIUM FERRIC GLUCONATE COMPLEX IN SUCROSE 12.5 MG/ML
125 INJECTION INTRAVENOUS
OUTPATIENT
Start: 2025-04-14

## 2025-04-07 RX ORDER — SODIUM CHLORIDE 0.9 % (FLUSH) 0.9 %
10 SYRINGE (ML) INJECTION
Status: DISCONTINUED | OUTPATIENT
Start: 2025-04-07 | End: 2025-04-07 | Stop reason: HOSPADM

## 2025-04-07 RX ORDER — EPINEPHRINE 1 MG/ML
0.3 INJECTION INTRAMUSCULAR; INTRAVENOUS; SUBCUTANEOUS ONCE AS NEEDED
Status: DISCONTINUED | OUTPATIENT
Start: 2025-04-07 | End: 2025-04-07 | Stop reason: HOSPADM

## 2025-04-07 RX ORDER — EPINEPHRINE 0.3 MG/.3ML
0.3 INJECTION SUBCUTANEOUS ONCE AS NEEDED
OUTPATIENT
Start: 2025-04-14

## 2025-04-07 RX ORDER — SODIUM CHLORIDE 0.9 % (FLUSH) 0.9 %
10 SYRINGE (ML) INJECTION
OUTPATIENT
Start: 2025-04-14

## 2025-04-07 RX ORDER — DIPHENHYDRAMINE HYDROCHLORIDE 50 MG/ML
50 INJECTION, SOLUTION INTRAMUSCULAR; INTRAVENOUS ONCE AS NEEDED
OUTPATIENT
Start: 2025-04-14

## 2025-04-07 RX ORDER — HEPARIN 100 UNIT/ML
500 SYRINGE INTRAVENOUS
OUTPATIENT
Start: 2025-04-14

## 2025-04-07 RX ORDER — DIPHENHYDRAMINE HYDROCHLORIDE 50 MG/ML
50 INJECTION, SOLUTION INTRAMUSCULAR; INTRAVENOUS ONCE AS NEEDED
Status: DISCONTINUED | OUTPATIENT
Start: 2025-04-07 | End: 2025-04-07 | Stop reason: HOSPADM

## 2025-04-07 RX ORDER — EPINEPHRINE 0.3 MG/.3ML
0.3 INJECTION SUBCUTANEOUS ONCE AS NEEDED
Status: CANCELLED | OUTPATIENT
Start: 2025-04-14

## 2025-04-07 RX ORDER — SODIUM FERRIC GLUCONATE COMPLEX IN SUCROSE 12.5 MG/ML
125 INJECTION INTRAVENOUS
Status: COMPLETED | OUTPATIENT
Start: 2025-04-07 | End: 2025-04-07

## 2025-04-07 RX ADMIN — SODIUM FERRIC GLUCONATE COMPLEX IN SUCROSE 125 MG: 12.5 INJECTION INTRAVENOUS at 02:04

## 2025-04-07 RX ADMIN — Medication 10 ML: at 02:04

## 2025-04-07 NOTE — NURSING
Pt ambulated to [facility]fusion [facility] for Ferrlecit # 2 of 8, no complaints & reports no problems after first infusion.

## 2025-04-09 ENCOUNTER — OFFICE VISIT (OUTPATIENT)
Dept: UROLOGY | Facility: CLINIC | Age: 60
End: 2025-04-09
Payer: MEDICAID

## 2025-04-09 VITALS
WEIGHT: 220 LBS | DIASTOLIC BLOOD PRESSURE: 77 MMHG | HEART RATE: 86 BPM | TEMPERATURE: 98 F | RESPIRATION RATE: 20 BRPM | BODY MASS INDEX: 33.34 KG/M2 | SYSTOLIC BLOOD PRESSURE: 128 MMHG | HEIGHT: 68 IN | OXYGEN SATURATION: 98 %

## 2025-04-09 DIAGNOSIS — C61 GLEASON GRADE GROUP 3 ADENOCARCINOMA OF PROSTATE: Primary | ICD-10-CM

## 2025-04-09 DIAGNOSIS — N13.8 BPH WITH OBSTRUCTION/LOWER URINARY TRACT SYMPTOMS: ICD-10-CM

## 2025-04-09 DIAGNOSIS — N40.1 BPH WITH OBSTRUCTION/LOWER URINARY TRACT SYMPTOMS: ICD-10-CM

## 2025-04-09 PROCEDURE — 3078F DIAST BP <80 MM HG: CPT | Mod: CPTII,,, | Performed by: UROLOGY

## 2025-04-09 PROCEDURE — 96402 CHEMO HORMON ANTINEOPL SQ/IM: CPT | Mod: PBBFAC

## 2025-04-09 PROCEDURE — 81001 URINALYSIS AUTO W/SCOPE: CPT | Mod: PBBFAC | Performed by: UROLOGY

## 2025-04-09 PROCEDURE — 3074F SYST BP LT 130 MM HG: CPT | Mod: CPTII,,, | Performed by: UROLOGY

## 2025-04-09 PROCEDURE — 99213 OFFICE O/P EST LOW 20 MIN: CPT | Mod: PBBFAC | Performed by: UROLOGY

## 2025-04-09 PROCEDURE — 1159F MED LIST DOCD IN RCRD: CPT | Mod: CPTII,,, | Performed by: UROLOGY

## 2025-04-09 PROCEDURE — 4010F ACE/ARB THERAPY RXD/TAKEN: CPT | Mod: CPTII,,, | Performed by: UROLOGY

## 2025-04-09 PROCEDURE — 99214 OFFICE O/P EST MOD 30 MIN: CPT | Mod: S$PBB,,, | Performed by: UROLOGY

## 2025-04-09 PROCEDURE — 1160F RVW MEDS BY RX/DR IN RCRD: CPT | Mod: CPTII,,, | Performed by: UROLOGY

## 2025-04-09 PROCEDURE — 3008F BODY MASS INDEX DOCD: CPT | Mod: CPTII,,, | Performed by: UROLOGY

## 2025-04-09 RX ADMIN — LEUPROLIDE ACETATE 45 MG: KIT at 11:04

## 2025-04-09 NOTE — PROGRESS NOTES
Seen by Dr. Ramirez RTC 6mnths with PSA  Lupron given to [facility] LOT# [id number] EXP 01/01/27 , no reaction after 15 minutes

## 2025-04-09 NOTE — PROGRESS NOTES
CC:  Prostate cancer    HPI:  Burton Vasquez is a 59 y.o. male seen for follow-up of adenocarcinoma of prostate.  He was diagnosed with prostate cancer in September 2020.  His initial PSA was 117.  Biopsy showed [place] 4+3.  MRI showed extracapsular extension and invasion of the seminal vesicles was strongly suspected.  He had EBRT from 19 October 2020 to 10 December 2020. Lupron was originally scheduled to go for three years.  He missed an injection in 2021 and the PSA immediately odalis to 7 and he began receiving Lupron again. The PSA then came down.  He is supposed to be taking Xtandi but is not. Last dose of Lupron 10/3/2024.  Bone scan, DEXA scan, and CT scan were all negative in January of 2025.  He is on tamsulosin for urinary frequency, nocturia, urgency. This is helping with his symptoms, no symptoms reported today.     Urinalysis:  Results for orders placed or performed in visit on 04/09/25   POCT URINE DIPSTICK WITH MICROSCOPE, AUTOMATED   Result Value Ref Range    Color, UA Yellow     Spec Grav UA 1.020     pH, UA 5.5     WBC, UA neg     Nitrite, UA neg     Protein, POC neg     Glucose, UA neg     Ketones, UA neg     Urobilinogen, UA 0.2     Bilirubin, POC neg     Blood, UA moderate      Microscopic Urinalysis:  WBC:   None per HPF     RBC:    3-6 per HPF     Bacteria:    None per HPF     Squamous epithelial cells:  None per HPF      Crystals:   None    Lab Results:  PSA History:    08/31/20 11:15 01/07/21 08:00 04/12/21 07:54 09/02/21 13:34 12/23/21 12:00 01/07/22 12:00 08/19/22 13:28 12/05/22 12:49 03/06/23 07:40 09/05/23 08:00 03/21/24 11:41 08/16/24 08:45 01/08/25 11:15 04/07/25 12:58   117.0 (H) 5.09 (H) 4.03 (H) 2.55 7.21 (H) 5.97 (H) 0.84 0.59 0.49 0.31 0.16 0.11 <0.10 <0.10       Imaging:  See HPI    ROS:  All systems reviewed and are negative except as documented in HPI and/or Assessment and Plan.     Patient Active Problem List:     Problem List[1]     Past Medical History:  Past Medical  History:   Diagnosis Date    Hypertension     Prostate cancer     Stroke         Past Surgical History:  Past Surgical History:   Procedure Laterality Date    COLONOSCOPY  12/29/2016    COLONOSCOPY Left 10/12/2022    Procedure: COLONOSCOPY;  Surgeon: Meggan Lester MD;  Location: [facility] ENDOSCOPY;  Service: Gastroenterology;  Laterality: Left;    EGD, WITH CLOSED BIOPSY N/A 2/26/2025    Procedure: EGD, WITH CLOSED BIOPSY;  Surgeon: Meggan Lester MD;  Location: [facility] ENDOSCOPY;  Service: Gastroenterology;  Laterality: N/A;        Family History:  Family History   Problem Relation Name Age of Onset    Hypertension Mother      Kidney failure Mother      Bone cancer Father      Throat cancer Paternal Grandfather          Social History:  Social History     Socioeconomic History    Marital status: Single   Tobacco Use    Smoking status: Every Day     Current packs/day: 0.50     Average packs/day: 0.9 packs/day for 38.2 years (34.6 ttl pk-yrs)     Types: Cigarettes     Start date: 1987     Last attempt to quit: 9/1/2024     Passive exposure: Never    Smokeless tobacco: Never   Substance and Sexual Activity    Alcohol use: Not Currently    Drug use: Never    Sexual activity: Yes     Social Drivers of Health     Financial Resource Strain: Low Risk  (7/9/2024)    Overall Financial Resource Strain (CARDIA)     Difficulty of Paying Living Expenses: Not very hard   Food Insecurity: Food Insecurity Present (7/9/2024)    Hunger Vital Sign     Worried About Running Out of Food in the Last Year: Sometimes true     Ran Out of Food in the Last Year: Sometimes true   Transportation Needs: No Transportation Needs (7/9/2024)    TRANSPORTATION NEEDS     Transportation : No   Physical Activity: Inactive (7/9/2024)    Exercise Vital Sign     Days of Exercise per Week: 0 days     Minutes of Exercise per Session: 0 min   Stress: No Stress Concern Present (7/9/2024)    [employer] [place] [employer] - Occupational Stress  Questionnaire     Feeling of Stress : Only a little   Housing Stability: Unknown (7/9/2024)    Housing Stability Vital Sign     Unable to Pay for Housing in the Last Year: No     Homeless in the Last Year: No        Allergies:  Review of patient's allergies indicates:  No Known Allergies     Objective:  Vitals:    04/09/25 1058   BP: 128/77   Pulse: 86   Resp: 20   Temp: 97.6 °F (36.4 °C)     General:  Well developed, well nourished adult male in no acute distress  Abdomen: Soft, nontender, no masses  Extremities:  No clubbing, cyanosis, or edema  Neurologic:  Grossly intact  Musculoskeletal:  Normal tone    Assessment:  1. Lupe grade group 3 adenocarcinoma of prostate  - POCT URINE DIPSTICK WITH MICROSCOPE, AUTOMATED  - leuprolide acetate (6 month) injection 45 mg  - PSA, Total (Diagnostic); Future    2. BPH with obstruction/lower urinary tract symptoms    Plan:  Lupron given today.  Continue Xtandi.  Continue tamsulosin.    Follow-up tests needed:   PSA in six months     Return appointment:  Six months with above labs for Lupron.                   [1]   Patient Active Problem List  Diagnosis    Coronary artery disease involving native coronary artery of native heart without angina pectoris    Primary hypertension    Tobacco user    Prostate CA    Anemia requiring transfusions    Esophagitis    Chronic hemorrhagic anemia    Iron deficiency anemia    Iron deficiency anemia due to chronic blood loss    Mixed hyperlipidemia    Olecranon bursitis of right elbow    BMI 34.0-34.9,adult    Chronic obstructive pulmonary disease    Restrictive lung disease    Muscle tension dysphonia    JOSE on CPAP    Hiatal hernia    Internal hemorrhoids without complication    Chronic idiopathic constipation    Type 2 diabetes mellitus without complication, without long-term current use of insulin

## 2025-04-14 ENCOUNTER — INFUSION (OUTPATIENT)
Dept: INFUSION THERAPY | Facility: HOSPITAL | Age: 60
End: 2025-04-14
Attending: INTERNAL MEDICINE
Payer: MEDICAID

## 2025-04-14 VITALS
HEART RATE: 88 BPM | TEMPERATURE: 99 F | OXYGEN SATURATION: 99 % | DIASTOLIC BLOOD PRESSURE: 85 MMHG | SYSTOLIC BLOOD PRESSURE: 138 MMHG | RESPIRATION RATE: 20 BRPM

## 2025-04-14 DIAGNOSIS — D50.0 IRON DEFICIENCY ANEMIA DUE TO CHRONIC BLOOD LOSS: Primary | ICD-10-CM

## 2025-04-14 PROCEDURE — 25000003 PHARM REV CODE 250: Performed by: NURSE PRACTITIONER

## 2025-04-14 PROCEDURE — 96374 THER/PROPH/DIAG INJ IV PUSH: CPT

## 2025-04-14 PROCEDURE — 63600175 PHARM REV CODE 636 W HCPCS: Performed by: NURSE PRACTITIONER

## 2025-04-14 RX ORDER — SODIUM FERRIC GLUCONATE COMPLEX IN SUCROSE 12.5 MG/ML
125 INJECTION INTRAVENOUS
Status: COMPLETED | OUTPATIENT
Start: 2025-04-14 | End: 2025-04-14

## 2025-04-14 RX ORDER — DIPHENHYDRAMINE HYDROCHLORIDE 50 MG/ML
50 INJECTION, SOLUTION INTRAMUSCULAR; INTRAVENOUS ONCE AS NEEDED
OUTPATIENT
Start: 2025-04-21

## 2025-04-14 RX ORDER — SODIUM CHLORIDE 0.9 % (FLUSH) 0.9 %
10 SYRINGE (ML) INJECTION
Status: DISCONTINUED | OUTPATIENT
Start: 2025-04-14 | End: 2025-04-14 | Stop reason: HOSPADM

## 2025-04-14 RX ORDER — HEPARIN 100 UNIT/ML
500 SYRINGE INTRAVENOUS
OUTPATIENT
Start: 2025-04-21

## 2025-04-14 RX ORDER — EPINEPHRINE 0.3 MG/.3ML
0.3 INJECTION SUBCUTANEOUS ONCE AS NEEDED
OUTPATIENT
Start: 2025-04-21

## 2025-04-14 RX ORDER — SODIUM FERRIC GLUCONATE COMPLEX IN SUCROSE 12.5 MG/ML
125 INJECTION INTRAVENOUS
OUTPATIENT
Start: 2025-04-21

## 2025-04-14 RX ORDER — HEPARIN 100 UNIT/ML
500 SYRINGE INTRAVENOUS
Status: DISCONTINUED | OUTPATIENT
Start: 2025-04-14 | End: 2025-04-14 | Stop reason: HOSPADM

## 2025-04-14 RX ORDER — SODIUM CHLORIDE 0.9 % (FLUSH) 0.9 %
10 SYRINGE (ML) INJECTION
OUTPATIENT
Start: 2025-04-21

## 2025-04-14 RX ADMIN — SODIUM CHLORIDE: 9 INJECTION, SOLUTION INTRAVENOUS at 02:04

## 2025-04-14 RX ADMIN — SODIUM FERRIC GLUCONATE COMPLEX IN SUCROSE 125 MG: 12.5 INJECTION INTRAVENOUS at 02:04

## 2025-04-21 ENCOUNTER — INFUSION (OUTPATIENT)
Dept: INFUSION THERAPY | Facility: HOSPITAL | Age: 60
End: 2025-04-21
Attending: INTERNAL MEDICINE
Payer: MEDICAID

## 2025-04-21 VITALS
HEART RATE: 100 BPM | BODY MASS INDEX: 33.71 KG/M2 | TEMPERATURE: 99 F | HEIGHT: 68 IN | RESPIRATION RATE: 20 BRPM | SYSTOLIC BLOOD PRESSURE: 133 MMHG | DIASTOLIC BLOOD PRESSURE: 90 MMHG | OXYGEN SATURATION: 97 % | WEIGHT: 222.44 LBS

## 2025-04-21 DIAGNOSIS — D50.0 IRON DEFICIENCY ANEMIA DUE TO CHRONIC BLOOD LOSS: Primary | ICD-10-CM

## 2025-04-21 PROCEDURE — 96374 THER/PROPH/DIAG INJ IV PUSH: CPT

## 2025-04-21 PROCEDURE — 63600175 PHARM REV CODE 636 W HCPCS: Performed by: NURSE PRACTITIONER

## 2025-04-21 RX ORDER — EPINEPHRINE 0.3 MG/.3ML
0.3 INJECTION SUBCUTANEOUS ONCE AS NEEDED
OUTPATIENT
Start: 2025-04-28

## 2025-04-21 RX ORDER — SODIUM FERRIC GLUCONATE COMPLEX IN SUCROSE 12.5 MG/ML
125 INJECTION INTRAVENOUS
OUTPATIENT
Start: 2025-04-28

## 2025-04-21 RX ORDER — SODIUM CHLORIDE 0.9 % (FLUSH) 0.9 %
10 SYRINGE (ML) INJECTION
Status: DISCONTINUED | OUTPATIENT
Start: 2025-04-21 | End: 2025-04-21 | Stop reason: HOSPADM

## 2025-04-21 RX ORDER — HEPARIN 100 UNIT/ML
500 SYRINGE INTRAVENOUS
OUTPATIENT
Start: 2025-04-28

## 2025-04-21 RX ORDER — DIPHENHYDRAMINE HYDROCHLORIDE 50 MG/ML
50 INJECTION, SOLUTION INTRAMUSCULAR; INTRAVENOUS ONCE AS NEEDED
Status: DISCONTINUED | OUTPATIENT
Start: 2025-04-21 | End: 2025-04-21 | Stop reason: HOSPADM

## 2025-04-21 RX ORDER — HEPARIN 100 UNIT/ML
500 SYRINGE INTRAVENOUS
Status: DISCONTINUED | OUTPATIENT
Start: 2025-04-21 | End: 2025-04-21 | Stop reason: HOSPADM

## 2025-04-21 RX ORDER — SODIUM CHLORIDE 0.9 % (FLUSH) 0.9 %
10 SYRINGE (ML) INJECTION
OUTPATIENT
Start: 2025-04-28

## 2025-04-21 RX ORDER — SODIUM FERRIC GLUCONATE COMPLEX IN SUCROSE 12.5 MG/ML
125 INJECTION INTRAVENOUS
Status: COMPLETED | OUTPATIENT
Start: 2025-04-21 | End: 2025-04-21

## 2025-04-21 RX ORDER — DIPHENHYDRAMINE HYDROCHLORIDE 50 MG/ML
50 INJECTION, SOLUTION INTRAMUSCULAR; INTRAVENOUS ONCE AS NEEDED
OUTPATIENT
Start: 2025-04-28

## 2025-04-21 RX ORDER — EPINEPHRINE 1 MG/ML
0.3 INJECTION INTRAMUSCULAR; INTRAVENOUS; SUBCUTANEOUS ONCE AS NEEDED
Status: DISCONTINUED | OUTPATIENT
Start: 2025-04-21 | End: 2025-04-21 | Stop reason: HOSPADM

## 2025-04-21 RX ADMIN — SODIUM FERRIC GLUCONATE COMPLEX IN SUCROSE 125 MG: 12.5 INJECTION INTRAVENOUS at 03:04

## 2025-04-22 ENCOUNTER — HOSPITAL ENCOUNTER (OUTPATIENT)
Facility: HOSPITAL | Age: 60
Discharge: HOME | End: 2025-04-22
Attending: INTERNAL MEDICINE | Admitting: INTERNAL MEDICINE
Payer: MEDICAID

## 2025-04-22 PROCEDURE — 91110 GI TRC IMG INTRAL ESOPH-ILE: CPT | Mod: TC | Performed by: INTERNAL MEDICINE

## 2025-04-22 PROCEDURE — 27201423 OPTIME MED/SURG SUP & DEVICES STERILE SUPPLY: Performed by: INTERNAL MEDICINE

## 2025-04-28 ENCOUNTER — INFUSION (OUTPATIENT)
Dept: INFUSION THERAPY | Facility: HOSPITAL | Age: 60
End: 2025-04-28
Attending: INTERNAL MEDICINE
Payer: MEDICAID

## 2025-04-28 VITALS
TEMPERATURE: 98 F | RESPIRATION RATE: 20 BRPM | OXYGEN SATURATION: 97 % | BODY MASS INDEX: 31.78 KG/M2 | HEIGHT: 68 IN | DIASTOLIC BLOOD PRESSURE: 88 MMHG | SYSTOLIC BLOOD PRESSURE: 135 MMHG | HEART RATE: 95 BPM | WEIGHT: 209.69 LBS

## 2025-04-28 DIAGNOSIS — D50.0 IRON DEFICIENCY ANEMIA DUE TO CHRONIC BLOOD LOSS: Primary | ICD-10-CM

## 2025-04-28 PROCEDURE — 96374 THER/PROPH/DIAG INJ IV PUSH: CPT

## 2025-04-28 PROCEDURE — 63600175 PHARM REV CODE 636 W HCPCS: Performed by: NURSE PRACTITIONER

## 2025-04-28 PROCEDURE — 25000003 PHARM REV CODE 250: Performed by: NURSE PRACTITIONER

## 2025-04-28 RX ORDER — SODIUM CHLORIDE 0.9 % (FLUSH) 0.9 %
10 SYRINGE (ML) INJECTION
Status: CANCELLED | OUTPATIENT
Start: 2025-05-05

## 2025-04-28 RX ORDER — HEPARIN 100 UNIT/ML
500 SYRINGE INTRAVENOUS
OUTPATIENT
Start: 2025-05-05

## 2025-04-28 RX ORDER — EPINEPHRINE 0.3 MG/.3ML
0.3 INJECTION SUBCUTANEOUS ONCE AS NEEDED
OUTPATIENT
Start: 2025-05-05

## 2025-04-28 RX ORDER — HEPARIN 100 UNIT/ML
500 SYRINGE INTRAVENOUS
Status: CANCELLED | OUTPATIENT
Start: 2025-05-05

## 2025-04-28 RX ORDER — SODIUM CHLORIDE 0.9 % (FLUSH) 0.9 %
10 SYRINGE (ML) INJECTION
OUTPATIENT
Start: 2025-05-05

## 2025-04-28 RX ORDER — SODIUM FERRIC GLUCONATE COMPLEX IN SUCROSE 12.5 MG/ML
125 INJECTION INTRAVENOUS
Status: CANCELLED | OUTPATIENT
Start: 2025-05-05

## 2025-04-28 RX ORDER — EPINEPHRINE 0.3 MG/.3ML
0.3 INJECTION SUBCUTANEOUS ONCE AS NEEDED
Status: CANCELLED | OUTPATIENT
Start: 2025-05-05

## 2025-04-28 RX ORDER — DIPHENHYDRAMINE HYDROCHLORIDE 50 MG/ML
50 INJECTION, SOLUTION INTRAMUSCULAR; INTRAVENOUS ONCE AS NEEDED
Status: CANCELLED | OUTPATIENT
Start: 2025-05-05

## 2025-04-28 RX ORDER — SODIUM CHLORIDE 0.9 % (FLUSH) 0.9 %
10 SYRINGE (ML) INJECTION
Status: DISCONTINUED | OUTPATIENT
Start: 2025-04-28 | End: 2025-04-28 | Stop reason: HOSPADM

## 2025-04-28 RX ORDER — HEPARIN 100 UNIT/ML
500 SYRINGE INTRAVENOUS
Status: DISCONTINUED | OUTPATIENT
Start: 2025-04-28 | End: 2025-04-28 | Stop reason: HOSPADM

## 2025-04-28 RX ORDER — SODIUM FERRIC GLUCONATE COMPLEX IN SUCROSE 12.5 MG/ML
125 INJECTION INTRAVENOUS
OUTPATIENT
Start: 2025-05-05

## 2025-04-28 RX ORDER — SODIUM FERRIC GLUCONATE COMPLEX IN SUCROSE 12.5 MG/ML
125 INJECTION INTRAVENOUS
Status: COMPLETED | OUTPATIENT
Start: 2025-04-28 | End: 2025-04-28

## 2025-04-28 RX ORDER — DIPHENHYDRAMINE HYDROCHLORIDE 50 MG/ML
50 INJECTION, SOLUTION INTRAMUSCULAR; INTRAVENOUS ONCE AS NEEDED
OUTPATIENT
Start: 2025-05-05

## 2025-04-28 RX ADMIN — SODIUM CHLORIDE: 9 INJECTION, SOLUTION INTRAVENOUS at 12:04

## 2025-04-28 RX ADMIN — SODIUM FERRIC GLUCONATE COMPLEX IN SUCROSE 125 MG: 12.5 INJECTION INTRAVENOUS at 12:04

## 2025-04-30 NOTE — [employer]ATION PATIENT INSTRUCTIONS
Discharge Summary/Instructions for after Video Capsule Endoscopy  Patient Name: Burton Vasquez  Patient MRN: [id number]  Patient YOB: 1965  Tuesday, April 22, 2025  Meggan Lester MD  1.  Do Not eat or drink anything for 1 hour.  Try sips of water first.  If   tolerated, resume your regular diet or one recommended by your physician.  2.  Do not drive, operate machinery, make critical decisions, or do   activities that require coordination or balance for 24 hours.  3.   You may experience a sore throat for 24 to 48 hours.  You may use   throat lozenges or gargle with warm salt water to relieve the discomfort.  4.  Because air was put into your stomach during the procedure, you may   experience some belching.  5.  Do not use any medication containing aspirin for 10 days.  6.  Go directly to the emergency room if you notice any of the following:   Chills and/or fever over 101 F   Persistent vomiting or vomiting with blood/nasal regurgitation   Severe abdominal pain, other than gas cramps   Severe chest pain   Black, tarry stools     Your doctor recommends these additional instructions:  You are being discharged to home.   You have a contact number available for emergencies.  The signs and symptoms   of potential delayed complications were discussed with you.  You may return   to normal activities tomorrow.  Written discharge instructions were   provided to you.   Resume your previous diet.   Continue your present medications.  If you have any questions or problems, please call your physician.  EMERGENCY PHONE NUMBER: [phone]  LAB RESULTS: [phone]  Meggan Lester MD  4/30/2025 6:10:45 PM  This report has been verified and signed electronically.  Dear patient,  As a result of recent federal legislation (The Federal Cures Act), you may   receive lab or pathology results from your procedure in your [facility]   account before your physician is able to contact you. Your physician or    their representative will relay the results to you with their   recommendations at their soonest availability.  Thank you,  [employer]ATION

## 2025-05-01 ENCOUNTER — RESULTS FOLLOW-UP (OUTPATIENT)
Dept: GASTROENTEROLOGY | Facility: CLINIC | Age: 60
End: 2025-05-01

## 2025-05-05 ENCOUNTER — INFUSION (OUTPATIENT)
Dept: INFUSION THERAPY | Facility: HOSPITAL | Age: 60
End: 2025-05-05
Attending: INTERNAL MEDICINE
Payer: MEDICARE

## 2025-05-05 VITALS
OXYGEN SATURATION: 97 % | DIASTOLIC BLOOD PRESSURE: 79 MMHG | HEART RATE: 80 BPM | SYSTOLIC BLOOD PRESSURE: 145 MMHG | WEIGHT: 213.5 LBS | RESPIRATION RATE: 20 BRPM | BODY MASS INDEX: 32.36 KG/M2 | TEMPERATURE: 98 F | HEIGHT: 68 IN

## 2025-05-05 DIAGNOSIS — D50.0 IRON DEFICIENCY ANEMIA DUE TO CHRONIC BLOOD LOSS: Primary | ICD-10-CM

## 2025-05-05 PROCEDURE — 63600175 PHARM REV CODE 636 W HCPCS: Performed by: NURSE PRACTITIONER

## 2025-05-05 PROCEDURE — 96374 THER/PROPH/DIAG INJ IV PUSH: CPT

## 2025-05-05 PROCEDURE — 25000003 PHARM REV CODE 250: Performed by: NURSE PRACTITIONER

## 2025-05-05 PROCEDURE — A4216 STERILE WATER/SALINE, 10 ML: HCPCS | Performed by: NURSE PRACTITIONER

## 2025-05-05 RX ORDER — SODIUM FERRIC GLUCONATE COMPLEX IN SUCROSE 12.5 MG/ML
125 INJECTION INTRAVENOUS
OUTPATIENT
Start: 2025-05-12

## 2025-05-05 RX ORDER — EPINEPHRINE 1 MG/ML
0.3 INJECTION INTRAMUSCULAR; INTRAVENOUS; SUBCUTANEOUS ONCE AS NEEDED
Status: DISCONTINUED | OUTPATIENT
Start: 2025-05-05 | End: 2025-05-05 | Stop reason: HOSPADM

## 2025-05-05 RX ORDER — HEPARIN 100 UNIT/ML
500 SYRINGE INTRAVENOUS
OUTPATIENT
Start: 2025-05-12

## 2025-05-05 RX ORDER — SODIUM CHLORIDE 0.9 % (FLUSH) 0.9 %
10 SYRINGE (ML) INJECTION
OUTPATIENT
Start: 2025-05-12

## 2025-05-05 RX ORDER — DIPHENHYDRAMINE HYDROCHLORIDE 50 MG/ML
50 INJECTION, SOLUTION INTRAMUSCULAR; INTRAVENOUS ONCE AS NEEDED
Status: DISCONTINUED | OUTPATIENT
Start: 2025-05-05 | End: 2025-05-05 | Stop reason: HOSPADM

## 2025-05-05 RX ORDER — DIPHENHYDRAMINE HYDROCHLORIDE 50 MG/ML
50 INJECTION, SOLUTION INTRAMUSCULAR; INTRAVENOUS ONCE AS NEEDED
OUTPATIENT
Start: 2025-05-12

## 2025-05-05 RX ORDER — SODIUM CHLORIDE 0.9 % (FLUSH) 0.9 %
10 SYRINGE (ML) INJECTION
Status: DISCONTINUED | OUTPATIENT
Start: 2025-05-05 | End: 2025-05-05 | Stop reason: HOSPADM

## 2025-05-05 RX ORDER — SODIUM FERRIC GLUCONATE COMPLEX IN SUCROSE 12.5 MG/ML
125 INJECTION INTRAVENOUS
Status: COMPLETED | OUTPATIENT
Start: 2025-05-05 | End: 2025-05-05

## 2025-05-05 RX ORDER — EPINEPHRINE 0.3 MG/.3ML
0.3 INJECTION SUBCUTANEOUS ONCE AS NEEDED
OUTPATIENT
Start: 2025-05-12

## 2025-05-05 RX ADMIN — SODIUM FERRIC GLUCONATE COMPLEX IN SUCROSE 125 MG: 12.5 INJECTION INTRAVENOUS at 01:05

## 2025-05-05 RX ADMIN — Medication 10 ML: at 01:05

## 2025-05-05 NOTE — NURSING
Pt presented [facility] for Ferrlecit # 6 of 8, c/o of feeling dizzy, pt stated he is diabetic, took his am meds & ate lunch & usually sits down until it passess; pt stated he was feeling better after iron infusion and a nap.

## 2025-05-07 ENCOUNTER — HOSPITAL ENCOUNTER (EMERGENCY)
Facility: HOSPITAL | Age: 60
Discharge: HOME | End: 2025-05-07
Attending: FAMILY MEDICINE
Payer: MEDICARE

## 2025-05-07 VITALS
TEMPERATURE: 98 F | SYSTOLIC BLOOD PRESSURE: 158 MMHG | RESPIRATION RATE: 20 BRPM | HEIGHT: 68 IN | HEART RATE: 90 BPM | BODY MASS INDEX: 33.34 KG/M2 | WEIGHT: 220 LBS | OXYGEN SATURATION: 99 % | DIASTOLIC BLOOD PRESSURE: 88 MMHG

## 2025-05-07 DIAGNOSIS — J01.00 ACUTE MAXILLARY SINUSITIS, RECURRENCE NOT SPECIFIED: ICD-10-CM

## 2025-05-07 DIAGNOSIS — N30.01 ACUTE CYSTITIS WITH HEMATURIA: Primary | ICD-10-CM

## 2025-05-07 LAB
ALBUMIN SERPL-MCNC: 3.5 G/DL (ref 3.5–5)
ALBUMIN/GLOB SERPL: 0.8 RATIO (ref 1.1–2)
ALP SERPL-CCNC: 135 UNIT/L (ref 40–150)
ALT SERPL-CCNC: 12 UNIT/L (ref 0–55)
ANION GAP SERPL CALC-SCNC: 10 MEQ/L
AST SERPL-CCNC: 13 UNIT/L (ref 11–45)
BACTERIA #/AREA URNS AUTO: ABNORMAL /HPF
BASOPHILS # BLD AUTO: 0.04 X10(3)/MCL
BASOPHILS NFR BLD AUTO: 0.7 %
BILIRUB SERPL-MCNC: 0.4 MG/DL
BILIRUB UR QL STRIP.AUTO: NEGATIVE
BUN SERPL-MCNC: 17.8 MG/DL (ref 8.4–25.7)
CALCIUM SERPL-MCNC: 9.5 MG/DL (ref 8.4–10.2)
CHLORIDE SERPL-SCNC: 106 MMOL/L (ref 98–107)
CLARITY UR: ABNORMAL
CO2 SERPL-SCNC: 25 MMOL/L (ref 22–29)
COLOR UR AUTO: ABNORMAL
CREAT SERPL-MCNC: 1.18 MG/DL (ref 0.72–1.25)
CREAT/UREA NIT SERPL: 15
EOSINOPHIL # BLD AUTO: 0.19 X10(3)/MCL (ref 0–0.9)
EOSINOPHIL NFR BLD AUTO: 3.1 %
ERYTHROCYTE [DISTWIDTH] IN BLOOD BY AUTOMATED COUNT: 18 % (ref 11.5–17)
FLUAV AG UPPER RESP QL IA.RAPID: NOT DETECTED
FLUBV AG UPPER RESP QL IA.RAPID: NOT DETECTED
GFR SERPLBLD CREATININE-BSD FMLA CKD-EPI: >60 ML/MIN/1.73/M2
GLOBULIN SER-MCNC: 4.6 GM/DL (ref 2.4–3.5)
GLUCOSE SERPL-MCNC: 138 MG/DL (ref 74–100)
GLUCOSE UR QL STRIP: NORMAL
HCT VFR BLD AUTO: 39 % (ref 42–52)
HGB BLD-MCNC: 12.7 G/DL (ref 14–18)
HGB UR QL STRIP: ABNORMAL
HOLD SPECIMEN: NORMAL
HYALINE CASTS #/AREA URNS LPF: ABNORMAL /LPF
IMM GRANULOCYTES # BLD AUTO: 0.04 X10(3)/MCL (ref 0–0.04)
IMM GRANULOCYTES NFR BLD AUTO: 0.7 %
KETONES UR QL STRIP: NEGATIVE
LEUKOCYTE ESTERASE UR QL STRIP: 500
LYMPHOCYTES # BLD AUTO: 2.58 X10(3)/MCL (ref 0.6–4.6)
LYMPHOCYTES NFR BLD AUTO: 42.7 %
MCH RBC QN AUTO: 24.4 PG (ref 27–31)
MCHC RBC AUTO-ENTMCNC: 32.6 G/DL (ref 33–36)
MCV RBC AUTO: 74.9 FL (ref 80–94)
MONOCYTES # BLD AUTO: 0.41 X10(3)/MCL (ref 0.1–1.3)
MONOCYTES NFR BLD AUTO: 6.8 %
NEUTROPHILS # BLD AUTO: 2.78 X10(3)/MCL (ref 2.1–9.2)
NEUTROPHILS NFR BLD AUTO: 46 %
NITRITE UR QL STRIP: NEGATIVE
NRBC BLD AUTO-RTO: 0 %
PH UR STRIP: 6 [PH]
PLATELET # BLD AUTO: 193 X10(3)/MCL (ref 130–400)
PMV BLD AUTO: 10.2 FL (ref 7.4–10.4)
POTASSIUM SERPL-SCNC: 4.5 MMOL/L (ref 3.5–5.1)
PROT SERPL-MCNC: 8.1 GM/DL (ref 6.4–8.3)
PROT UR QL STRIP: ABNORMAL
RBC # BLD AUTO: 5.21 X10(6)/MCL (ref 4.7–6.1)
RBC #/AREA URNS AUTO: ABNORMAL /HPF
RENAL EPI CELLS #/AREA UR COMP ASSIST: ABNORMAL /HPF
SARS-COV-2 RNA RESP QL NAA+PROBE: NOT DETECTED
SODIUM SERPL-SCNC: 141 MMOL/L (ref 136–145)
SP GR UR STRIP.AUTO: 1.02 (ref 1–1.03)
SQUAMOUS #/AREA URNS LPF: ABNORMAL /HPF
STREP A PCR (OHS): NOT DETECTED
UROBILINOGEN UR STRIP-ACNC: NORMAL
WBC # BLD AUTO: 6.04 X10(3)/MCL (ref 4.5–11.5)
WBC #/AREA URNS AUTO: >100 /HPF
WBC CLUMPS UR QL AUTO: ABNORMAL

## 2025-05-07 PROCEDURE — 0240U COVID/FLU A&B PCR: CPT | Performed by: PHYSICIAN ASSISTANT

## 2025-05-07 PROCEDURE — 80053 COMPREHEN METABOLIC PANEL: CPT | Performed by: PHYSICIAN ASSISTANT

## 2025-05-07 PROCEDURE — 87651 STREP A DNA AMP PROBE: CPT | Performed by: PHYSICIAN ASSISTANT

## 2025-05-07 PROCEDURE — 25500020 PHARM REV CODE 255

## 2025-05-07 PROCEDURE — 99285 EMERGENCY DEPT VISIT HI MDM: CPT | Mod: 25

## 2025-05-07 PROCEDURE — 81015 MICROSCOPIC EXAM OF URINE: CPT | Performed by: PHYSICIAN ASSISTANT

## 2025-05-07 PROCEDURE — 85025 COMPLETE CBC W/AUTO DIFF WBC: CPT | Performed by: PHYSICIAN ASSISTANT

## 2025-05-07 PROCEDURE — 87077 CULTURE AEROBIC IDENTIFY: CPT | Performed by: PHYSICIAN ASSISTANT

## 2025-05-07 RX ORDER — AMOXICILLIN AND CLAVULANATE POTASSIUM 875; 125 MG/1; MG/1
1 TABLET, FILM COATED ORAL 2 TIMES DAILY
Qty: 14 TABLET | Refills: 0 | Status: SHIPPED | OUTPATIENT
Start: 2025-05-07

## 2025-05-07 RX ADMIN — IOHEXOL 95 ML: 350 INJECTION, SOLUTION INTRAVENOUS at 01:05

## 2025-05-07 NOTE — ED PROVIDER NOTES
"Encounter Date: 5/7/2025       History     Chief Complaint   Patient presents with    Dysuria     Reports dysuria and "sinus infection" x3 days.      Burton Vasquez is a 59 y.o. male with a PMHx of prostate cancer and HTN who presents to the ED with complaints of dysuria and nasal congestion that started 3 day(s) ago. He reports he feels as if he isn't completely empyting his bladder. He denies fever, chills, nausea, vomiting, and flank pain.        The history is provided by the patient. No  was used.     Review of patient's allergies indicates:  No Known Allergies  Past Medical History:   Diagnosis Date    Hypertension     Prostate cancer     Stroke      Past Surgical History:   Procedure Laterality Date    COLONOSCOPY  12/29/2016    COLONOSCOPY Left 10/12/2022    Procedure: COLONOSCOPY;  Surgeon: Meggan Lester MD;  Location: [facility] ENDOSCOPY;  Service: Gastroenterology;  Laterality: Left;    EGD, WITH CLOSED BIOPSY N/A 2/26/2025    Procedure: EGD, WITH CLOSED BIOPSY;  Surgeon: Meggan Lester MD;  Location: [facility] ENDOSCOPY;  Service: Gastroenterology;  Laterality: N/A;    INTRALUMINAL GASTROINTESTINAL TRACT IMAGING VIA CAPSULE N/A 4/22/2025    Procedure: IMAGING PROCEDURE, GI TRACT, INTRALUMINAL, VIA CAPSULE;  Surgeon: Meggan Lester MD;  Location: [facility] ENDOSCOPY;  Service: Gastroenterology;  Laterality: N/A;     Family History   Problem Relation Name Age of Onset    Hypertension Mother      Kidney failure Mother      Bone cancer Father      Throat cancer Paternal Grandfather       Social History[1]  Review of Systems   Constitutional:  Negative for chills, fatigue and fever.   HENT:  Negative for congestion, ear pain, sinus pain and sore throat.    Eyes:  Negative for pain.   Respiratory:  Negative for cough, chest tightness and shortness of breath.    Cardiovascular:  Negative for chest pain.   Gastrointestinal:  Negative for abdominal pain, constipation, diarrhea, nausea and " vomiting.   Genitourinary:  Positive for dysuria, hematuria and urgency. Negative for decreased urine volume, flank pain, frequency, penile discharge, penile pain, penile swelling and testicular pain.   Musculoskeletal:  Negative for back pain and joint swelling.   Skin:  Negative for color change and rash.   Neurological:  Negative for dizziness and weakness.   Psychiatric/Behavioral:  Negative for behavioral problems and confusion.        Physical Exam     Initial Vitals [05/07/25 1041]   BP Pulse Resp Temp SpO2   (!) 161/91 101 20 98.4 °F (36.9 °C) 99 %      MAP       --         Physical Exam    Nursing note and vitals reviewed.  Constitutional: He appears well-developed and well-nourished.   HENT:   Head: Normocephalic and atraumatic.   Right Ear: External ear normal.   Left Ear: External ear normal.   Nose: Mucosal edema and rhinorrhea present. Right sinus exhibits maxillary sinus tenderness. Left sinus exhibits maxillary sinus tenderness.   Eyes: Conjunctivae and EOM are normal.   Neck: Neck supple. No thyromegaly present. No tracheal deviation present.   Cardiovascular:  Normal rate, regular rhythm and normal heart sounds.     Exam reveals no gallop and no friction rub.       No murmur heard.  Pulmonary/Chest: Breath sounds normal. No respiratory distress. He has no wheezes. He has no rhonchi. He has no rales. He exhibits no tenderness.   Abdominal: Abdomen is soft. He exhibits no distension.   Musculoskeletal:      Cervical back: Neck supple.     Neurological: He is alert and oriented to person, place, and time. GCS score is 15. GCS eye subscore is 4. GCS verbal subscore is 5. GCS motor subscore is 6.   Skin: Skin is warm. Capillary refill takes less than 2 seconds. No rash and no abscess noted. No erythema. No pallor.   Psychiatric: He has a normal mood and affect.         ED Course   Procedures  Labs Reviewed   URINALYSIS, REFLEX TO URINE CULTURE - Abnormal       Result Value    Color, UA Light-Yellow       Appearance, UA Turbid (*)     Specific Gravity, UA 1.018      pH, UA 6.0      Protein, UA 1+ (*)     Glucose, UA Normal      Ketones, UA Negative      Blood, UA 2+ (*)     Bilirubin, UA Negative      Urobilinogen, UA Normal      Nitrites, UA Negative      Leukocyte Esterase,  (*)     RBC, UA 11-20 (*)     WBC, UA >100 (*)     WBC Clumps, UA Trace (*)     Bacteria, UA Few (*)     Squamous Epithelial Cells, UA None Seen      Renal Epithelial Cells, UA Occasional (*)     Hyaline Casts, UA None Seen     COMPREHENSIVE METABOLIC PANEL - Abnormal    Sodium 141      Potassium 4.5      Chloride 106      CO2 25      Glucose 138 (*)     Blood Urea Nitrogen 17.8      Creatinine 1.18      Calcium 9.5      Protein Total 8.1      Albumin 3.5      Globulin 4.6 (*)     Albumin/Globulin Ratio 0.8 (*)     Bilirubin Total 0.4            ALT 12      AST 13      eGFR >60      Anion Gap 10.0      BUN/Creatinine Ratio 15     CBC WITH DIFFERENTIAL - Abnormal    WBC 6.04      RBC 5.21      Hgb 12.7 (*)     Hct 39.0 (*)     MCV 74.9 (*)     MCH 24.4 (*)     MCHC 32.6 (*)     RDW 18.0 (*)     Platelet 193      MPV 10.2      Neut % 46.0      Lymph % 42.7      Mono % 6.8      Eos % 3.1      Basophil % 0.7      Imm Grans % 0.7      Neut # 2.78      Lymph # 2.58      Mono # 0.41      Eos # 0.19      Baso # 0.04      Imm Gran # 0.04      NRBC% 0.0     COVID/FLU A&B PCR - Normal    Influenza A PCR Not Detected      Influenza B PCR Not Detected      SARS-CoV-2 PCR Not Detected      Narrative:     The Xpert Xpress SARS-CoV-2/FLU/RSV plus is a rapid, multiplexed real-time PCR test intended for the simultaneous qualitative detection and differentiation of SARS-CoV-2, Influenza A, Influenza B, and respiratory syncytial virus (RSV) viral RNA in either nasopharyngeal swab or nasal swab specimens.         STREP GROUP A BY PCR - Normal    STREP A PCR ([facility]) Not Detected      Narrative:     The Xpert Xpress Strep A test is a rapid, qualitative in  vitro diagnostic test for the detection of Streptococcus pyogenes (Group A ß-hemolytic Streptococcus, Strep A) in throat swab specimens from patients with signs and symptoms of pharyngitis.     CULTURE, URINE   CBC W/ AUTO DIFFERENTIAL    Narrative:     The following orders were created for panel order CBC auto differential.  Procedure                               Abnormality         Status                     ---------                               -----------         ------                     CBC with Differential[[id number]]       Abnormal            Final result                 Please view results for these tests on the individual orders.   EXTRA TUBES    Narrative:     The following orders were created for panel order EXTRA TUBES.  Procedure                               Abnormality         Status                     ---------                               -----------         ------                     Red Top Hold[[id number]]                                    Final result               Lavender Top Hold[[id number]]                               Final result               Gold Top Hold[[id number]]                                   Final result                 Please view results for these tests on the individual orders.   RED TOP HOLD    Extra Tube Hold for add-ons.     LAVENDER TOP HOLD    Extra Tube Hold for add-ons.     GOLD TOP HOLD    Extra Tube Hold for add-ons.            Imaging Results              CT Abdomen Pelvis With IV Contrast NO Oral Contrast (Final result)  Result time 05/07/25 14:51:14      Final result by Rolo Marsh MD (05/07/25 14:51:14)                   Narrative:    EXAMINATION  CT ABDOMEN PELVIS WITH IV CONTRAST    CLINICAL HISTORY  dysuria;    TECHNIQUE  Post-contrast helical-acquisition CT images were obtained and multiplanar reformats accomplished by a CT technologist at a separate workstation, pushed to PACS for physician review.    CONTRAST  *IV: Omnipaque 350, 95  mL  *Enteric: none    COMPARISON  None available at the time of initial interpretation.    FINDINGS  Images were reviewed in soft tissue, lung, and bone windows.    Exam quality: adequate for evaluation    Lines/tubes: none visualized    Cardiopulmonary: Visualized heart chambers are normal volume.  No acute or focal abnormality of the included lower lung zones.  No significant pericardial or pleural fluid.    Hepatobiliary/Pancreas: No acute process or space occupying mass lesion of the liver or pancreas.  Miniscule low-density foci scattered through the liver are suggestive of small cysts, otherwise inadequate size for detailed characterization.  Gallbladder is unremarkable. No biliary or pancreatic duct dilatation.  Portal vein is patent.    Spleen: No acute or focal abnormality.    Adrenals/: No suspicious adrenal or renal lesion. No radiodense urolithiasis or evidence of distal obstructive uropathy.  There is significant diffuse thickening of the urinary bladder wall with appearance of urothelial enhancement suggesting cystitis.  No definite focal mass-like mural abnormality is identified.  There is similar heterogeneous prominence of the prostate.    Esophagus/GI tract: The included lower esophagus is unremarkable.  No evidence of gastric outlet or small bowel obstruction. No acute inflammatory process or convincing focal lesion. The appendix is normal.    Peritoneal/Extraperitoneal Spaces: No free fluid or air, and no drainable collections.  Aortoiliac course is nonaneurysmal, with widespread atherosclerotic plaquing and calcification noted.  No pathologic mike enlargement or necrotic process.    Musculoskeletal: Degenerative changes are present throughout the visualized spinal column and bony pelvis.  No acute or destructive osseous process is appreciated.  There is irregular subcutaneous attenuation of the bilateral gluteal regions that may represent sites of recent injection but otherwise of  indeterminate etiology.  Otherwise, of the superficial soft tissues and regional musculature are unremarkable.    IMPRESSION  1. Urinary bladder findings suggestive of cystitis, recommend correlation with pertinent clinical/laboratory details.  2. No other convincing acute abdominopelvic findings.  3. Chronic/secondary details discussed above.    RADIATION DOSE  Automated tube current modulation, weight-based exposure dosing, and/or iterative reconstruction technique utilized to reach lowest reasonably achievable exposure rate.    DLP: 432 mGy*cm      Electronically signed by: Rolo Marsh  Date:    05/07/2025  Time:    14:51                                     Medications   iohexoL (OMNIPAQUE 350) 350 mg iodine/mL injection (95 mLs Intravenous Given 5/7/25 1330)     Medical Decision Making  DDX: cystitis, pyelonephritis, kidney stone, among others    Burton Vasquez is a 59 y.o. male with a PMHx of prostate cancer and HTN who presents to the ED with complaints of dysuria and nasal congestion that started 3 day(s) ago. He reports he feels as if he isn't completely empyting his bladder. He denies fever, chills, nausea, vomiting, and flank pain.      Hospital Course: Labs and urine obtained. UA with infection with hematuria. CT ordered ot further assess kidneys. CT reveals cystitis. Will DC home with Augmentin for a UTI and developing sinusitis. Strict return precautions given. Stable for discharge.     Amount and/or Complexity of Data Reviewed  Labs: ordered.  Radiology: ordered.    Risk  Prescription drug management.                                      Clinical Impression:  Final diagnoses:  [N30.01] Acute cystitis with hematuria (Primary)  [J01.00] Acute maxillary sinusitis, recurrence not specified          ED Disposition Condition    Discharge Stable          ED Prescriptions       Medication Sig Dispense Start Date End Date Auth. Provider    amoxicillin-clavulanate 875-125mg (AUGMENTIN) 875-125 mg per tablet Take  1 tablet by mouth 2 (two) times daily. 14 tablet 5/7/2025 -- Chuyita Grey PA-C          Follow-up Information       Follow up With Specialties Details Why Contact Info    Carolina Zimmer MD Internal Medicine   [address] [facility] [address]  [phone]      [facility] Emergency Medicine In 3 days As needed, If symptoms worsen [address] [facility] [address] [phone]               [1]   Social History  Tobacco Use    Smoking status: Every Day     Current packs/day: 0.50     Average packs/day: 0.9 packs/day for 38.3 years (34.6 ttl pk-yrs)     Types: Cigarettes     Start date: 1987     Last attempt to quit: 9/1/2024     Passive exposure: Never    Smokeless tobacco: Never   Substance Use Topics    Alcohol use: Not Currently    Drug use: Never        Chuyita Grey PA-C  05/07/25 1537

## 2025-05-09 ENCOUNTER — RESULTS FOLLOW-UP (OUTPATIENT)
Dept: EMERGENCY MEDICINE | Facility: HOSPITAL | Age: 60
End: 2025-05-09
Payer: MEDICARE

## 2025-05-09 LAB — BACTERIA UR CULT: ABNORMAL

## 2025-05-12 ENCOUNTER — INFUSION (OUTPATIENT)
Dept: INFUSION THERAPY | Facility: HOSPITAL | Age: 60
End: 2025-05-12
Attending: INTERNAL MEDICINE
Payer: MEDICARE

## 2025-05-12 VITALS
DIASTOLIC BLOOD PRESSURE: 86 MMHG | HEART RATE: 95 BPM | OXYGEN SATURATION: 99 % | HEIGHT: 68 IN | SYSTOLIC BLOOD PRESSURE: 137 MMHG | WEIGHT: 218.94 LBS | BODY MASS INDEX: 33.18 KG/M2 | RESPIRATION RATE: 20 BRPM | TEMPERATURE: 98 F

## 2025-05-12 DIAGNOSIS — D50.0 IRON DEFICIENCY ANEMIA DUE TO CHRONIC BLOOD LOSS: Primary | ICD-10-CM

## 2025-05-12 PROCEDURE — 96374 THER/PROPH/DIAG INJ IV PUSH: CPT

## 2025-05-12 PROCEDURE — 63600175 PHARM REV CODE 636 W HCPCS: Performed by: NURSE PRACTITIONER

## 2025-05-12 RX ORDER — HEPARIN 100 UNIT/ML
500 SYRINGE INTRAVENOUS
OUTPATIENT
Start: 2025-05-19

## 2025-05-12 RX ORDER — HEPARIN 100 UNIT/ML
500 SYRINGE INTRAVENOUS
Status: DISCONTINUED | OUTPATIENT
Start: 2025-05-12 | End: 2025-05-12 | Stop reason: HOSPADM

## 2025-05-12 RX ORDER — DIPHENHYDRAMINE HYDROCHLORIDE 50 MG/ML
50 INJECTION, SOLUTION INTRAMUSCULAR; INTRAVENOUS ONCE AS NEEDED
OUTPATIENT
Start: 2025-05-19

## 2025-05-12 RX ORDER — SODIUM CHLORIDE 0.9 % (FLUSH) 0.9 %
10 SYRINGE (ML) INJECTION
Status: DISCONTINUED | OUTPATIENT
Start: 2025-05-12 | End: 2025-05-12 | Stop reason: HOSPADM

## 2025-05-12 RX ORDER — SODIUM CHLORIDE 0.9 % (FLUSH) 0.9 %
10 SYRINGE (ML) INJECTION
OUTPATIENT
Start: 2025-05-19

## 2025-05-12 RX ORDER — EPINEPHRINE 0.3 MG/.3ML
0.3 INJECTION SUBCUTANEOUS ONCE AS NEEDED
OUTPATIENT
Start: 2025-05-19

## 2025-05-12 RX ORDER — SODIUM FERRIC GLUCONATE COMPLEX IN SUCROSE 12.5 MG/ML
125 INJECTION INTRAVENOUS
Status: COMPLETED | OUTPATIENT
Start: 2025-05-12 | End: 2025-05-12

## 2025-05-12 RX ORDER — SODIUM FERRIC GLUCONATE COMPLEX IN SUCROSE 12.5 MG/ML
125 INJECTION INTRAVENOUS
OUTPATIENT
Start: 2025-05-19

## 2025-05-12 RX ADMIN — SODIUM FERRIC GLUCONATE COMPLEX IN SUCROSE 125 MG: 12.5 INJECTION INTRAVENOUS at 01:05

## 2025-05-12 NOTE — PROGRESS NOTES
Patient contacted with urine culture results, informed need to change antibiotic.  Instructed to stop  taking augmentin, start Cipro 500 mg 1po bid for 7 days, request prescription called in at [facility].

## 2025-05-12 NOTE — PROGRESS NOTES
Correction to previous documentation, patient currently taking Augmentin for UTI culture reviewed with provider.  Cipro 500 mg 1 po bid x 7 days ordered per ASHLEE Frank.

## 2025-05-19 ENCOUNTER — INFUSION (OUTPATIENT)
Dept: INFUSION THERAPY | Facility: HOSPITAL | Age: 60
End: 2025-05-19
Attending: INTERNAL MEDICINE
Payer: MEDICARE

## 2025-05-19 VITALS
TEMPERATURE: 99 F | HEART RATE: 95 BPM | OXYGEN SATURATION: 96 % | HEIGHT: 68 IN | WEIGHT: 218.19 LBS | SYSTOLIC BLOOD PRESSURE: 143 MMHG | DIASTOLIC BLOOD PRESSURE: 91 MMHG | RESPIRATION RATE: 20 BRPM | BODY MASS INDEX: 33.07 KG/M2

## 2025-05-19 DIAGNOSIS — D50.0 IRON DEFICIENCY ANEMIA DUE TO CHRONIC BLOOD LOSS: Primary | ICD-10-CM

## 2025-05-19 PROCEDURE — A4216 STERILE WATER/SALINE, 10 ML: HCPCS | Performed by: NURSE PRACTITIONER

## 2025-05-19 PROCEDURE — 96374 THER/PROPH/DIAG INJ IV PUSH: CPT

## 2025-05-19 PROCEDURE — 63600175 PHARM REV CODE 636 W HCPCS: Performed by: NURSE PRACTITIONER

## 2025-05-19 PROCEDURE — 25000003 PHARM REV CODE 250: Performed by: NURSE PRACTITIONER

## 2025-05-19 RX ORDER — DIPHENHYDRAMINE HYDROCHLORIDE 50 MG/ML
50 INJECTION, SOLUTION INTRAMUSCULAR; INTRAVENOUS ONCE AS NEEDED
Status: DISCONTINUED | OUTPATIENT
Start: 2025-05-19 | End: 2025-05-19 | Stop reason: HOSPADM

## 2025-05-19 RX ORDER — SODIUM CHLORIDE 0.9 % (FLUSH) 0.9 %
10 SYRINGE (ML) INJECTION
OUTPATIENT
Start: 2025-05-19

## 2025-05-19 RX ORDER — SODIUM FERRIC GLUCONATE COMPLEX IN SUCROSE 12.5 MG/ML
125 INJECTION INTRAVENOUS
Status: COMPLETED | OUTPATIENT
Start: 2025-05-19 | End: 2025-05-19

## 2025-05-19 RX ORDER — EPINEPHRINE 1 MG/ML
0.3 INJECTION INTRAMUSCULAR; INTRAVENOUS; SUBCUTANEOUS ONCE AS NEEDED
Status: DISCONTINUED | OUTPATIENT
Start: 2025-05-19 | End: 2025-05-19 | Stop reason: HOSPADM

## 2025-05-19 RX ORDER — EPINEPHRINE 0.3 MG/.3ML
0.3 INJECTION SUBCUTANEOUS ONCE AS NEEDED
OUTPATIENT
Start: 2025-05-19

## 2025-05-19 RX ORDER — SODIUM CHLORIDE 0.9 % (FLUSH) 0.9 %
10 SYRINGE (ML) INJECTION
Status: DISCONTINUED | OUTPATIENT
Start: 2025-05-19 | End: 2025-05-19 | Stop reason: HOSPADM

## 2025-05-19 RX ORDER — SODIUM FERRIC GLUCONATE COMPLEX IN SUCROSE 12.5 MG/ML
125 INJECTION INTRAVENOUS
Status: CANCELLED | OUTPATIENT
Start: 2025-05-19

## 2025-05-19 RX ORDER — DIPHENHYDRAMINE HYDROCHLORIDE 50 MG/ML
50 INJECTION, SOLUTION INTRAMUSCULAR; INTRAVENOUS ONCE AS NEEDED
OUTPATIENT
Start: 2025-05-19

## 2025-05-19 RX ORDER — HEPARIN 100 UNIT/ML
500 SYRINGE INTRAVENOUS
OUTPATIENT
Start: 2025-05-19

## 2025-05-19 RX ADMIN — SODIUM FERRIC GLUCONATE COMPLEX IN SUCROSE 125 MG: 12.5 INJECTION INTRAVENOUS at 01:05

## 2025-05-19 RX ADMIN — Medication 10 ML: at 02:05

## 2025-05-19 NOTE — NURSING
Pt presented to [facility]fusion Clinic for Ferrlecit # 8 of 8, no complaints, proceeded with infusion via peripheral IV.

## 2025-08-04 ENCOUNTER — OFFICE VISIT (OUTPATIENT)
Dept: INTERNAL MEDICINE | Facility: CLINIC | Age: 60
End: 2025-08-04
Payer: MEDICARE

## 2025-08-04 VITALS
OXYGEN SATURATION: 96 % | RESPIRATION RATE: 20 BRPM | BODY MASS INDEX: 37.98 KG/M2 | TEMPERATURE: 98 F | SYSTOLIC BLOOD PRESSURE: 131 MMHG | WEIGHT: 242 LBS | HEART RATE: 77 BPM | HEIGHT: 67 IN | DIASTOLIC BLOOD PRESSURE: 83 MMHG

## 2025-08-04 DIAGNOSIS — Z87.891 PERSONAL HISTORY OF NICOTINE DEPENDENCE: ICD-10-CM

## 2025-08-04 DIAGNOSIS — D50.0 IRON DEFICIENCY ANEMIA DUE TO CHRONIC BLOOD LOSS: ICD-10-CM

## 2025-08-04 DIAGNOSIS — K44.9 HIATAL HERNIA: ICD-10-CM

## 2025-08-04 DIAGNOSIS — I10 HYPERTENSION, UNSPECIFIED TYPE: ICD-10-CM

## 2025-08-04 DIAGNOSIS — E11.9 TYPE 2 DIABETES MELLITUS WITHOUT COMPLICATION, WITHOUT LONG-TERM CURRENT USE OF INSULIN: Primary | ICD-10-CM

## 2025-08-04 DIAGNOSIS — Z72.0 TOBACCO USE: ICD-10-CM

## 2025-08-04 DIAGNOSIS — I10 PRIMARY HYPERTENSION: ICD-10-CM

## 2025-08-04 DIAGNOSIS — E78.2 MIXED HYPERLIPIDEMIA: ICD-10-CM

## 2025-08-04 DIAGNOSIS — I25.10 CORONARY ARTERY DISEASE INVOLVING NATIVE CORONARY ARTERY OF NATIVE HEART WITHOUT ANGINA PECTORIS: ICD-10-CM

## 2025-08-04 DIAGNOSIS — K59.04 CHRONIC IDIOPATHIC CONSTIPATION: ICD-10-CM

## 2025-08-04 DIAGNOSIS — R06.2 WHEEZING: ICD-10-CM

## 2025-08-04 DIAGNOSIS — E55.9 VITAMIN D DEFICIENCY: ICD-10-CM

## 2025-08-04 LAB — HBA1C MFR BLD: 8.4 %

## 2025-08-04 PROCEDURE — 83036 HEMOGLOBIN GLYCOSYLATED A1C: CPT | Mod: PBBFAC

## 2025-08-04 PROCEDURE — 99214 OFFICE O/P EST MOD 30 MIN: CPT | Mod: PBBFAC

## 2025-08-04 RX ORDER — INSULIN LISPRO 100 [IU]/ML
20 INJECTION, SOLUTION INTRAVENOUS; SUBCUTANEOUS
Qty: 54 ML | Refills: 3 | Status: SHIPPED | OUTPATIENT
Start: 2025-08-04 | End: 2026-08-04

## 2025-08-04 RX ORDER — ATORVASTATIN CALCIUM 40 MG/1
40 TABLET, FILM COATED ORAL DAILY
Qty: 90 TABLET | Refills: 3 | Status: SHIPPED | OUTPATIENT
Start: 2025-08-04 | End: 2026-08-04

## 2025-08-04 RX ORDER — ALBUTEROL SULFATE 90 UG/1
2 INHALANT RESPIRATORY (INHALATION) EVERY 4 HOURS PRN
Qty: 18 G | Refills: 2 | Status: SHIPPED | OUTPATIENT
Start: 2025-08-04

## 2025-08-04 RX ORDER — LANCETS
1 EACH MISCELLANEOUS
Qty: 100 EACH | Refills: 3 | Status: SHIPPED | OUTPATIENT
Start: 2025-08-04

## 2025-08-04 RX ORDER — LISINOPRIL 5 MG/1
5 TABLET ORAL DAILY
Qty: 90 TABLET | Refills: 3 | Status: SHIPPED | OUTPATIENT
Start: 2025-08-04 | End: 2026-08-04

## 2025-08-04 RX ORDER — POLYETHYLENE GLYCOL 3350 17 G/17G
17 POWDER, FOR SOLUTION ORAL DAILY
Qty: 510 G | Refills: 5 | Status: SHIPPED | OUTPATIENT
Start: 2025-08-04

## 2025-08-04 RX ORDER — METOPROLOL SUCCINATE 50 MG/1
50 TABLET, EXTENDED RELEASE ORAL DAILY
Qty: 90 TABLET | Refills: 3 | Status: SHIPPED | OUTPATIENT
Start: 2025-08-04 | End: 2026-08-04

## 2025-08-04 RX ORDER — ASPIRIN 81 MG/1
81 TABLET ORAL DAILY
Qty: 90 TABLET | Refills: 3 | Status: SHIPPED | OUTPATIENT
Start: 2025-08-04 | End: 2026-08-04

## 2025-08-04 RX ORDER — INSULIN GLARGINE-YFGN 100 [IU]/ML
10 INJECTION, SOLUTION SUBCUTANEOUS NIGHTLY
Qty: 9 ML | Refills: 3 | Status: SHIPPED | OUTPATIENT
Start: 2025-08-04 | End: 2026-08-04

## 2025-08-04 RX ORDER — PANTOPRAZOLE SODIUM 40 MG/1
40 TABLET, DELAYED RELEASE ORAL DAILY
Qty: 30 TABLET | Refills: 11 | Status: SHIPPED | OUTPATIENT
Start: 2025-08-04 | End: 2026-08-04

## 2025-08-04 RX ORDER — AMLODIPINE BESYLATE 5 MG/1
5 TABLET ORAL DAILY
Qty: 90 TABLET | Refills: 3 | Status: SHIPPED | OUTPATIENT
Start: 2025-08-04 | End: 2026-08-04

## 2025-08-04 RX ORDER — FERROUS SULFATE 325(65) MG
325 TABLET ORAL
Qty: 30 TABLET | Refills: 5 | Status: SHIPPED | OUTPATIENT
Start: 2025-08-04

## 2025-08-04 RX ORDER — METFORMIN HYDROCHLORIDE 1000 MG/1
1000 TABLET ORAL 2 TIMES DAILY WITH MEALS
Qty: 180 TABLET | Refills: 3 | Status: SHIPPED | OUTPATIENT
Start: 2025-08-04 | End: 2026-08-04

## 2025-08-04 NOTE — PROGRESS NOTES
PROGRESS NOTE  Ambulatory Clinic  Burton Vasquez [id number] 8/4/2025  Chief Complaint  Follow-up (6 month follow up) and Medication Refill     HPI  Burton Vasquez is a 59 y.o. male who has a past medical history of Hypertension, Prostate cancer, and Stroke.  He presents to clinic today for follow-up of chronic medical conditions. Denies any complaints at this time. Patient has shortness of breath that has been ongoing for about 2-3 years.     ROS  Constitutional: no fever  Respiratory: chronic shortness of breath  Cardiovascular: no chest pain, no palpitations, no edema  Gastrointestinal: no nausea, vomiting, or diarrhea. No abdominal pain  Genitourinary: no dysuria, no urinary frequency or urgency, no hematuria  Hema/Lymph: no abnormal bruising or bleeding  Endocrine: no heat or cold intolerance, no excessive thirst or excessive urination  Musculoskeletal: no muscle or joint pain, no joint swelling  Integumentary: no skin rash or abnormal lesion  Neurologic: no headache, no dizziness    PE  Vitals:    08/04/25 0850   BP: 131/83   Pulse: 77   Resp: 20   Temp: 98.4 °F (36.9 °C)        GA: Alert, comfortable, no acute distress.   HEENT: Adequate dentition. No visible oropharyngeal abnormalities. No scleral icterus or JVD. No visible thyromegally.   Pulmonary: Chest wall symmetric. No accessory muscle use. No abnormalities on percussion. No tenderness to palpation. Clear to auscultation A/P/L bilaterally. No wheezing, crackles, or rhonchi.  Cardiac: RRR S1 & S2 w/o rubs/murmurs/gallops. No lower extremity edema.   Abdominal: Bowel sounds present x 4. No appreciable hepatosplenomegaly.  Skin: No jaundice, rashes, or visible lesions.  Lymphatic: No cervical or inguinal lymphadenopathy.  Musculoskeletal: Moves all extremities 5/5.  Extremities: No clubbing or cyanosis.  Neuro: CN grossly intact, normal gait, normal coordination, no sensory or motor deficits    Diabetic foot exam 2/2025:   Left: Normal monofilament exam, 1+  dp/pt, callus on heel, thickened trimmed toenails, no wounds/ulcers  Right: Normal monofilament exam, 1+ dp/pt, callus on heel, thickened trimmed toenails, no wounds/ulcers    Assessment/Plan  Gastritis  Esophagitis  Anemia secondary to above  Blood in stool - resolved  -symptoms resolved, continues to take PPI  -hemoglobin stable and continues to improve.  -Follows with GI    Type 2 diabetes mellitus   - POC A1c 8.6 2/2025, prev 11.7 in 9/2024, repeat in 3 months  - Humalog 20u TIDWM, Glargine 10u qhs, metformin 1000mg BID (increased from 500 mg daily)  - DM microalb creatinine ordered today  - DM eye exam 2/2025  - DM foot exam 2/2025  - Continue statin and  ACEi    Coronary artery disease  Hypertension  Elevated ASCVD risk score  Nicotine use disorder  Obesity  - /94  -start lisinopril 5 mg daily, continue amlodipine 5 mg daily, metoprolol succinate 50 mg daily  - Counseled on diet and exercise  -currently not interested in weight loss medications  -last [facility] 2021  -echo was ordered by Cardiology but patient has not scheduled this yet  -BNP was normal, chest x-ray was unremarkable, chest CT low dose was also unremarkable in 6/2024  -patient reports improvement in shortness a breath  - PFT demonstrates moderate restrictive pathology no underlying COPD, flow volume loops appeared to have signs extrinsic variable obstruction, he was referred to ENT to rule out any vocal cord paralysis or pathology  -Order nicotine patches 2/2025    History of prostate cancer s/p prostatectomy with subsequent low testosterone  S/p XRT  -Continue following with Oncology; recommended calling clinic because no recent encounters  -missed first lupron injection  -f/u with urology  -s/p xrt  -dexa 7/11/22 WNL    PHTN  JOSE on CPAP  ESS16  Sleep study in 6/2024: auto-CPAP 5-20 cm H2O  Reports nightly compliance with CPAP  No signs of DVT    Preventative  DM (age>45 or HTN): repeat at next visit  Lipid (age>35): ldl 63 (7mo  ago)  Declined STD screening  Lung Ca (30PY smoker age 55-79 or quit in last 15y): Lung-RADS 1 in 6/2024  Colon Ca: (age 50-75-annual FOBT, 5y flex + FOBTq3 or 10y c-scope):  Last colonoscopy 2022- for any malignancy repeat in 2032  Immunizations (generally - flu, shingrix, t-dap, PCV, Covid):  Declines all vaccines at this time (pneumonia today, tetanus next time)      RTC in 6 months.        Future Appointments   Date Time Provider Department Center   8/25/2025  8:45 AM Luz Acosta FNP [facility] CARD [place]    10/1/2025  8:30 AM Magnolia Alexander FNP [facility] GASTRO [place]    10/9/2025 11:00 AM Birdie Ramirez DO [facility] UROLO [place]     Bhupendra Land DO  [facility], PGY-2  8/4/2025

## 2025-08-15 ENCOUNTER — HOSPITAL ENCOUNTER (OUTPATIENT)
Dept: RADIOLOGY | Facility: HOSPITAL | Age: 60
Discharge: HOME | End: 2025-08-15
Payer: MEDICARE

## 2025-08-15 DIAGNOSIS — Z87.891 PERSONAL HISTORY OF NICOTINE DEPENDENCE: ICD-10-CM

## 2025-08-15 DIAGNOSIS — Z72.0 TOBACCO USE: ICD-10-CM

## 2025-08-15 PROCEDURE — 71271 CT THORAX LUNG CANCER SCR C-: CPT | Mod: TC

## 2025-08-25 ENCOUNTER — OFFICE VISIT (OUTPATIENT)
Dept: CARDIOLOGY | Facility: CLINIC | Age: 60
End: 2025-08-25
Payer: MEDICARE

## 2025-08-25 VITALS
OXYGEN SATURATION: 100 % | TEMPERATURE: 98 F | WEIGHT: 225 LBS | HEART RATE: 90 BPM | BODY MASS INDEX: 34.1 KG/M2 | DIASTOLIC BLOOD PRESSURE: 84 MMHG | RESPIRATION RATE: 18 BRPM | SYSTOLIC BLOOD PRESSURE: 138 MMHG | HEIGHT: 68 IN

## 2025-08-25 DIAGNOSIS — E78.2 MIXED HYPERLIPIDEMIA: ICD-10-CM

## 2025-08-25 DIAGNOSIS — I10 PRIMARY HYPERTENSION: Primary | ICD-10-CM

## 2025-08-25 DIAGNOSIS — R49.0 MUSCLE TENSION DYSPHONIA: ICD-10-CM

## 2025-08-25 DIAGNOSIS — G47.33 OSA ON CPAP: ICD-10-CM

## 2025-08-25 DIAGNOSIS — I73.9 CLAUDICATION: ICD-10-CM

## 2025-08-25 DIAGNOSIS — J42 CHRONIC BRONCHITIS, UNSPECIFIED CHRONIC BRONCHITIS TYPE: ICD-10-CM

## 2025-08-25 DIAGNOSIS — I25.10 CORONARY ARTERY DISEASE INVOLVING NATIVE CORONARY ARTERY OF NATIVE HEART WITHOUT ANGINA PECTORIS: ICD-10-CM

## 2025-08-25 DIAGNOSIS — Z72.0 TOBACCO USER: ICD-10-CM

## 2025-08-25 PROCEDURE — 99214 OFFICE O/P EST MOD 30 MIN: CPT | Mod: S$PBB,,, | Performed by: NURSE PRACTITIONER

## 2025-08-25 PROCEDURE — 99215 OFFICE O/P EST HI 40 MIN: CPT | Mod: PBBFAC | Performed by: NURSE PRACTITIONER

## 2025-08-25 PROCEDURE — 3066F NEPHROPATHY DOC TX: CPT | Mod: CPTII,,, | Performed by: NURSE PRACTITIONER

## 2025-08-25 PROCEDURE — 4010F ACE/ARB THERAPY RXD/TAKEN: CPT | Mod: CPTII,,, | Performed by: NURSE PRACTITIONER

## 2025-08-25 PROCEDURE — 3052F HG A1C>EQUAL 8.0%<EQUAL 9.0%: CPT | Mod: CPTII,,, | Performed by: NURSE PRACTITIONER

## 2025-08-25 PROCEDURE — 3075F SYST BP GE 130 - 139MM HG: CPT | Mod: CPTII,,, | Performed by: NURSE PRACTITIONER

## 2025-08-25 PROCEDURE — 3060F POS MICROALBUMINURIA REV: CPT | Mod: CPTII,,, | Performed by: NURSE PRACTITIONER

## 2025-08-25 PROCEDURE — 3008F BODY MASS INDEX DOCD: CPT | Mod: CPTII,,, | Performed by: NURSE PRACTITIONER

## 2025-08-25 PROCEDURE — 3079F DIAST BP 80-89 MM HG: CPT | Mod: CPTII,,, | Performed by: NURSE PRACTITIONER

## 2025-08-25 PROCEDURE — 1160F RVW MEDS BY RX/DR IN RCRD: CPT | Mod: CPTII,,, | Performed by: NURSE PRACTITIONER

## 2025-08-25 PROCEDURE — 1159F MED LIST DOCD IN RCRD: CPT | Mod: CPTII,,, | Performed by: NURSE PRACTITIONER

## 2025-09-02 ENCOUNTER — HOSPITAL ENCOUNTER (OUTPATIENT)
Dept: RADIOLOGY | Facility: HOSPITAL | Age: 60
Discharge: HOME | End: 2025-09-02
Attending: NURSE PRACTITIONER
Payer: MEDICARE

## 2025-09-02 DIAGNOSIS — I73.9 CLAUDICATION: ICD-10-CM

## 2025-09-02 LAB
IMMEDIATE ARM BP: 155 MMHG
IMMEDIATE LEFT ABI: 1.09
IMMEDIATE LEFT TIBIAL: 169 MMHG
IMMEDIATE RIGHT ABI: 1.05
IMMEDIATE RIGHT TIBIAL: 162 MMHG
LEFT ABI: 1.11
LEFT ARM BP: 134 MMHG
LEFT CFA PSV: 79 CM/S
LEFT DORSALIS PEDIS PSV: 34 CM/S
LEFT DORSALIS PEDIS: 146 MMHG
LEFT POPLITEAL PSV: 61 CM/S
LEFT POST TIBIAL SYS PSV: 93 CM/S
LEFT POSTERIOR TIBIAL: 168 MMHG
LEFT PROFUNDA SYS PSV: 98 CM/S
LEFT SUPER FEMORAL DIST SYS PSV: 81 CM/S
LEFT SUPER FEMORAL MID SYS PSV: 116 CM/S
LEFT SUPER FEMORAL PROX SYS PSV: 85 CM/S
OHS CV LEFT LOWER EXTREMITY ABI (NO CALC): 1.11
OHS CV RIGHT ABI LOWER EXTREMITY (NO CALC): 1.09
RIGHT ABI: 1.09
RIGHT ARM BP: 151 MMHG
RIGHT CFA PSV: 79 CM/S
RIGHT DORSALIS PEDIS PSV: 17 CM/S
RIGHT DORSALIS PEDIS: 158 MMHG
RIGHT POPLITEAL PSV: 42 CM/S
RIGHT POST TIBIAL SYS PSV: 79 CM/S
RIGHT POSTERIOR TIBIAL: 165 MMHG
RIGHT PROFUNDA SYS PSV: 33 CM/S
RIGHT SUPER FEMORAL DIST SYS PSV: 66 CM/S
RIGHT SUPER FEMORAL MID SYS PSV: 71 CM/S
RIGHT SUPER FEMORAL PROX SYS PSV: 85 CM/S

## 2025-09-02 PROCEDURE — 93925 LOWER EXTREMITY STUDY: CPT

## 2025-09-02 PROCEDURE — 93924 LWR XTR VASC STDY BILAT: CPT

## 2025-09-03 ENCOUNTER — TELEPHONE (OUTPATIENT)
Dept: CARDIOLOGY | Facility: CLINIC | Age: 60
End: 2025-09-03
Payer: MEDICARE

## 2025-09-04 RX ORDER — PEN NEEDLE, DIABETIC 30 GX3/16"
1 NEEDLE, DISPOSABLE MISCELLANEOUS 4 TIMES DAILY
Qty: 200 EACH | Refills: 11 | Status: SHIPPED | OUTPATIENT
Start: 2025-09-04

## (undated) DEVICE — MANIFOLD 4 PORT

## (undated) DEVICE — MOUTHPIECE ENDO 60FR

## (undated) DEVICE — FORCEP BIOPSY RAD JAW 240CM

## (undated) DEVICE — PILLCAM SB3 EX EXTENDED

## (undated) DEVICE — FORCEP ALLIGATOR 2.8MM W/NDL

## (undated) DEVICE — SOL IRRI STRL WATER 1000ML

## (undated) DEVICE — KIT SURGICAL COLON .25 1.1OZ